# Patient Record
Sex: FEMALE | Race: BLACK OR AFRICAN AMERICAN | Employment: OTHER | URBAN - NONMETROPOLITAN AREA
[De-identification: names, ages, dates, MRNs, and addresses within clinical notes are randomized per-mention and may not be internally consistent; named-entity substitution may affect disease eponyms.]

---

## 2017-05-31 PROBLEM — E66.9 DIABETES MELLITUS TYPE 2 IN OBESE (HCC): Status: ACTIVE | Noted: 2017-05-31

## 2017-05-31 PROBLEM — R07.9 CHEST PAIN: Status: ACTIVE | Noted: 2017-05-31

## 2017-05-31 PROBLEM — E11.69 DIABETES MELLITUS TYPE 2 IN OBESE (HCC): Status: ACTIVE | Noted: 2017-05-31

## 2017-10-24 ENCOUNTER — HOSPITAL ENCOUNTER (OUTPATIENT)
Dept: ULTRASOUND IMAGING | Age: 62
Discharge: HOME | End: 2017-10-24
Payer: MEDICARE

## 2017-10-24 DIAGNOSIS — E04.1 LEFT THYROID NODULE: ICD-10-CM

## 2017-10-24 PROCEDURE — 76536 US EXAM OF HEAD AND NECK: CPT

## 2017-11-15 ENCOUNTER — HOSPITAL ENCOUNTER (OUTPATIENT)
Dept: ULTRASOUND IMAGING | Age: 62
Discharge: HOME | End: 2017-11-15
Payer: MEDICARE

## 2017-11-15 DIAGNOSIS — E04.2 MULTIPLE THYROID NODULES: ICD-10-CM

## 2017-11-15 PROCEDURE — 88172 CYTP DX EVAL FNA 1ST EA SITE: CPT

## 2017-11-15 PROCEDURE — 88173 CYTOPATH EVAL FNA REPORT: CPT

## 2017-11-15 PROCEDURE — 88177 CYTP FNA EVAL EA ADDL: CPT

## 2017-11-15 PROCEDURE — 76942 ECHO GUIDE FOR BIOPSY: CPT

## 2017-11-15 NOTE — PROGRESS NOTES
Formulation and discussion of sedation / procedure plans, risks, benefits, side effects and alternatives with patient and/or responsible adult completed.     Electronically signed by Ryder Hairston MD on 11/15/2017 at 10:33 AM

## 2018-02-16 ENCOUNTER — TELEPHONE (OUTPATIENT)
Dept: PULMONOLOGY | Age: 63
End: 2018-02-16

## 2018-06-07 ENCOUNTER — HOSPITAL ENCOUNTER (INPATIENT)
Age: 63
LOS: 2 days | Discharge: HOME HEALTH | DRG: 041 | End: 2018-06-09
Attending: EMERGENCY MEDICINE | Admitting: INTERNAL MEDICINE
Payer: MEDICARE

## 2018-06-07 ENCOUNTER — APPOINTMENT (OUTPATIENT)
Dept: MRI IMAGING | Age: 63
DRG: 041 | End: 2018-06-07
Payer: MEDICARE

## 2018-06-07 ENCOUNTER — APPOINTMENT (OUTPATIENT)
Dept: CT IMAGING | Age: 63
DRG: 041 | End: 2018-06-07
Payer: MEDICARE

## 2018-06-07 DIAGNOSIS — R20.2 PARESTHESIA: Primary | ICD-10-CM

## 2018-06-07 DIAGNOSIS — I63.039 CEREBROVASCULAR ACCIDENT (CVA) DUE TO THROMBOSIS OF CAROTID ARTERY, UNSPECIFIED BLOOD VESSEL LATERALITY (HCC): ICD-10-CM

## 2018-06-07 PROBLEM — I63.9 ACUTE CVA (CEREBROVASCULAR ACCIDENT) (HCC): Status: ACTIVE | Noted: 2018-06-07

## 2018-06-07 LAB
ALBUMIN SERPL-MCNC: 3.6 G/DL (ref 3.5–5.1)
ALP BLD-CCNC: 59 U/L (ref 38–126)
ALT SERPL-CCNC: 16 U/L (ref 11–66)
AMPHETAMINE+METHAMPHETAMINE URINE SCREEN: NEGATIVE
ANION GAP SERPL CALCULATED.3IONS-SCNC: 12 MEQ/L (ref 8–16)
ANISOCYTOSIS: ABNORMAL
APTT: 28.8 SECONDS (ref 22–38)
AST SERPL-CCNC: 18 U/L (ref 5–40)
BACTERIA: ABNORMAL /HPF
BARBITURATE QUANTITATIVE URINE: NEGATIVE
BASOPHILS # BLD: 0.5 %
BASOPHILS ABSOLUTE: 0 THOU/MM3 (ref 0–0.1)
BENZODIAZEPINE QUANTITATIVE URINE: NEGATIVE
BILIRUB SERPL-MCNC: 0.5 MG/DL (ref 0.3–1.2)
BILIRUBIN DIRECT: < 0.2 MG/DL (ref 0–0.3)
BILIRUBIN URINE: NEGATIVE
BLOOD, URINE: ABNORMAL
BUN BLDV-MCNC: 16 MG/DL (ref 7–22)
CALCIUM SERPL-MCNC: 9.4 MG/DL (ref 8.5–10.5)
CANNABINOID QUANTITATIVE URINE: NEGATIVE
CASTS 2: ABNORMAL /LPF
CASTS UA: ABNORMAL /LPF
CHARACTER, URINE: CLEAR
CHLORIDE BLD-SCNC: 93 MEQ/L (ref 98–111)
CHOLESTEROL, TOTAL: 158 MG/DL (ref 100–199)
CO2: 27 MEQ/L (ref 23–33)
COCAINE METABOLITE QUANTITATIVE URINE: NEGATIVE
COLOR: YELLOW
CREAT SERPL-MCNC: 0.9 MG/DL (ref 0.4–1.2)
CRYSTALS, UA: ABNORMAL
EKG ATRIAL RATE: 63 BPM
EKG P AXIS: 39 DEGREES
EKG P-R INTERVAL: 176 MS
EKG Q-T INTERVAL: 448 MS
EKG QRS DURATION: 106 MS
EKG QTC CALCULATION (BAZETT): 458 MS
EKG R AXIS: 12 DEGREES
EKG T AXIS: 19 DEGREES
EKG VENTRICULAR RATE: 63 BPM
EOSINOPHIL # BLD: 3.3 %
EOSINOPHILS ABSOLUTE: 0.3 THOU/MM3 (ref 0–0.4)
EPITHELIAL CELLS, UA: ABNORMAL /HPF
ETHYL ALCOHOL, SERUM: < 0.01 %
GFR SERPL CREATININE-BSD FRML MDRD: 76 ML/MIN/1.73M2
GLUCOSE BLD-MCNC: 160 MG/DL (ref 70–108)
GLUCOSE URINE: NEGATIVE MG/DL
HCT VFR BLD CALC: 46 % (ref 37–47)
HDLC SERPL-MCNC: 52 MG/DL
HEMOGLOBIN: 15.2 GM/DL (ref 12–16)
INR BLD: 1 (ref 0.85–1.13)
KETONES, URINE: NEGATIVE
LDL CHOLESTEROL CALCULATED: 90 MG/DL
LEUKOCYTE ESTERASE, URINE: ABNORMAL
LIPASE: 19.1 U/L (ref 5.6–51.3)
LV EF: 60 %
LVEF MODALITY: NORMAL
LYMPHOCYTES # BLD: 28.8 %
LYMPHOCYTES ABSOLUTE: 2.3 THOU/MM3 (ref 1–4.8)
MAGNESIUM: 1.7 MG/DL (ref 1.6–2.4)
MCH RBC QN AUTO: 27.1 PG (ref 27–31)
MCHC RBC AUTO-ENTMCNC: 33.1 GM/DL (ref 33–37)
MCV RBC AUTO: 81.7 FL (ref 81–99)
MISCELLANEOUS 2: ABNORMAL
MONOCYTES # BLD: 6.1 %
MONOCYTES ABSOLUTE: 0.5 THOU/MM3 (ref 0.4–1.3)
NITRITE, URINE: NEGATIVE
NUCLEATED RED BLOOD CELLS: 0 /100 WBC
OPIATES, URINE: NEGATIVE
OSMOLALITY CALCULATION: 269.1 MOSMOL/KG (ref 275–300)
OXYCODONE: NEGATIVE
PDW BLD-RTO: 14.8 % (ref 11.5–14.5)
PH UA: 5.5
PHENCYCLIDINE QUANTITATIVE URINE: NEGATIVE
PLATELET # BLD: 202 THOU/MM3 (ref 130–400)
PMV BLD AUTO: 9.4 FL (ref 7.4–10.4)
POTASSIUM SERPL-SCNC: 4 MEQ/L (ref 3.5–5.2)
PROTEIN UA: NEGATIVE
RBC # BLD: 5.63 MILL/MM3 (ref 4.2–5.4)
RBC URINE: ABNORMAL /HPF
RENAL EPITHELIAL, UA: ABNORMAL
SEG NEUTROPHILS: 61.3 %
SEGMENTED NEUTROPHILS ABSOLUTE COUNT: 5 THOU/MM3 (ref 1.8–7.7)
SODIUM BLD-SCNC: 132 MEQ/L (ref 135–145)
SPECIFIC GRAVITY, URINE: 1.01 (ref 1–1.03)
TOTAL PROTEIN: 7 G/DL (ref 6.1–8)
TRIGL SERPL-MCNC: 81 MG/DL (ref 0–199)
TROPONIN T: < 0.01 NG/ML
TSH SERPL DL<=0.05 MIU/L-ACNC: 4.21 UIU/ML (ref 0.4–4.2)
UROBILINOGEN, URINE: 0.2 EU/DL
WBC # BLD: 8.1 THOU/MM3 (ref 4.8–10.8)
WBC UA: ABNORMAL /HPF
YEAST: ABNORMAL

## 2018-06-07 PROCEDURE — 80053 COMPREHEN METABOLIC PANEL: CPT

## 2018-06-07 PROCEDURE — 99222 1ST HOSP IP/OBS MODERATE 55: CPT | Performed by: INTERNAL MEDICINE

## 2018-06-07 PROCEDURE — 6360000002 HC RX W HCPCS: Performed by: INTERNAL MEDICINE

## 2018-06-07 PROCEDURE — 97530 THERAPEUTIC ACTIVITIES: CPT

## 2018-06-07 PROCEDURE — 1210000002 HC MED SURG R&B - NEUROSCIENCE

## 2018-06-07 PROCEDURE — 6370000000 HC RX 637 (ALT 250 FOR IP): Performed by: INTERNAL MEDICINE

## 2018-06-07 PROCEDURE — G0480 DRUG TEST DEF 1-7 CLASSES: HCPCS

## 2018-06-07 PROCEDURE — 97166 OT EVAL MOD COMPLEX 45 MIN: CPT

## 2018-06-07 PROCEDURE — 70498 CT ANGIOGRAPHY NECK: CPT

## 2018-06-07 PROCEDURE — G8988 SELF CARE GOAL STATUS: HCPCS

## 2018-06-07 PROCEDURE — 99285 EMERGENCY DEPT VISIT HI MDM: CPT

## 2018-06-07 PROCEDURE — 6370000000 HC RX 637 (ALT 250 FOR IP): Performed by: EMERGENCY MEDICINE

## 2018-06-07 PROCEDURE — 84443 ASSAY THYROID STIM HORMONE: CPT

## 2018-06-07 PROCEDURE — G8979 MOBILITY GOAL STATUS: HCPCS

## 2018-06-07 PROCEDURE — 87086 URINE CULTURE/COLONY COUNT: CPT

## 2018-06-07 PROCEDURE — 93010 ELECTROCARDIOGRAM REPORT: CPT | Performed by: INTERNAL MEDICINE

## 2018-06-07 PROCEDURE — 2580000003 HC RX 258: Performed by: INTERNAL MEDICINE

## 2018-06-07 PROCEDURE — 92610 EVALUATE SWALLOWING FUNCTION: CPT

## 2018-06-07 PROCEDURE — G8987 SELF CARE CURRENT STATUS: HCPCS

## 2018-06-07 PROCEDURE — 97535 SELF CARE MNGMENT TRAINING: CPT

## 2018-06-07 PROCEDURE — 70496 CT ANGIOGRAPHY HEAD: CPT

## 2018-06-07 PROCEDURE — 80061 LIPID PANEL: CPT

## 2018-06-07 PROCEDURE — G8978 MOBILITY CURRENT STATUS: HCPCS

## 2018-06-07 PROCEDURE — 83735 ASSAY OF MAGNESIUM: CPT

## 2018-06-07 PROCEDURE — 85610 PROTHROMBIN TIME: CPT

## 2018-06-07 PROCEDURE — 82248 BILIRUBIN DIRECT: CPT

## 2018-06-07 PROCEDURE — 93005 ELECTROCARDIOGRAM TRACING: CPT | Performed by: EMERGENCY MEDICINE

## 2018-06-07 PROCEDURE — 70551 MRI BRAIN STEM W/O DYE: CPT

## 2018-06-07 PROCEDURE — 85025 COMPLETE CBC W/AUTO DIFF WBC: CPT

## 2018-06-07 PROCEDURE — 97110 THERAPEUTIC EXERCISES: CPT

## 2018-06-07 PROCEDURE — 97161 PT EVAL LOW COMPLEX 20 MIN: CPT

## 2018-06-07 PROCEDURE — 36415 COLL VENOUS BLD VENIPUNCTURE: CPT

## 2018-06-07 PROCEDURE — 85730 THROMBOPLASTIN TIME PARTIAL: CPT

## 2018-06-07 PROCEDURE — 93306 TTE W/DOPPLER COMPLETE: CPT

## 2018-06-07 PROCEDURE — 70450 CT HEAD/BRAIN W/O DYE: CPT

## 2018-06-07 PROCEDURE — 6360000004 HC RX CONTRAST MEDICATION: Performed by: EMERGENCY MEDICINE

## 2018-06-07 PROCEDURE — 83690 ASSAY OF LIPASE: CPT

## 2018-06-07 PROCEDURE — 80307 DRUG TEST PRSMV CHEM ANLYZR: CPT

## 2018-06-07 PROCEDURE — 84484 ASSAY OF TROPONIN QUANT: CPT

## 2018-06-07 PROCEDURE — 81001 URINALYSIS AUTO W/SCOPE: CPT

## 2018-06-07 RX ORDER — ASPIRIN 81 MG/1
81 TABLET, CHEWABLE ORAL DAILY
Status: DISCONTINUED | OUTPATIENT
Start: 2018-06-07 | End: 2018-06-09 | Stop reason: HOSPADM

## 2018-06-07 RX ORDER — SODIUM CHLORIDE 9 MG/ML
INJECTION, SOLUTION INTRAVENOUS CONTINUOUS
Status: DISCONTINUED | OUTPATIENT
Start: 2018-06-07 | End: 2018-06-09 | Stop reason: HOSPADM

## 2018-06-07 RX ORDER — FUROSEMIDE 40 MG/1
40 TABLET ORAL DAILY
Status: DISCONTINUED | OUTPATIENT
Start: 2018-06-07 | End: 2018-06-07

## 2018-06-07 RX ORDER — HYDRALAZINE HYDROCHLORIDE 25 MG/1
25 TABLET, FILM COATED ORAL 3 TIMES DAILY
Status: DISCONTINUED | OUTPATIENT
Start: 2018-06-07 | End: 2018-06-09 | Stop reason: HOSPADM

## 2018-06-07 RX ORDER — ATENOLOL 100 MG/1
100 TABLET ORAL DAILY
Status: DISCONTINUED | OUTPATIENT
Start: 2018-06-07 | End: 2018-06-09 | Stop reason: HOSPADM

## 2018-06-07 RX ORDER — AMLODIPINE BESYLATE 5 MG/1
5 TABLET ORAL DAILY
Status: DISCONTINUED | OUTPATIENT
Start: 2018-06-07 | End: 2018-06-09 | Stop reason: HOSPADM

## 2018-06-07 RX ORDER — ERGOCALCIFEROL 1.25 MG/1
50000 CAPSULE ORAL WEEKLY
Status: DISCONTINUED | OUTPATIENT
Start: 2018-06-13 | End: 2018-06-09 | Stop reason: HOSPADM

## 2018-06-07 RX ORDER — NAPROXEN 250 MG/1
500 TABLET ORAL 2 TIMES DAILY
Status: DISCONTINUED | OUTPATIENT
Start: 2018-06-07 | End: 2018-06-09 | Stop reason: HOSPADM

## 2018-06-07 RX ORDER — MORPHINE SULFATE 2 MG/ML
2 INJECTION, SOLUTION INTRAMUSCULAR; INTRAVENOUS EVERY 4 HOURS PRN
Status: DISPENSED | OUTPATIENT
Start: 2018-06-07 | End: 2018-06-08

## 2018-06-07 RX ORDER — SODIUM CHLORIDE 0.9 % (FLUSH) 0.9 %
10 SYRINGE (ML) INJECTION EVERY 12 HOURS SCHEDULED
Status: DISCONTINUED | OUTPATIENT
Start: 2018-06-07 | End: 2018-06-09 | Stop reason: HOSPADM

## 2018-06-07 RX ORDER — PRAVASTATIN SODIUM 80 MG/1
80 TABLET ORAL NIGHTLY
Status: DISCONTINUED | OUTPATIENT
Start: 2018-06-07 | End: 2018-06-09 | Stop reason: HOSPADM

## 2018-06-07 RX ORDER — ASCORBIC ACID 500 MG
250 TABLET ORAL DAILY
Status: DISCONTINUED | OUTPATIENT
Start: 2018-06-07 | End: 2018-06-09 | Stop reason: HOSPADM

## 2018-06-07 RX ORDER — ASPIRIN 325 MG
325 TABLET ORAL ONCE
Status: COMPLETED | OUTPATIENT
Start: 2018-06-07 | End: 2018-06-07

## 2018-06-07 RX ORDER — FERROUS SULFATE 325(65) MG
325 TABLET ORAL
Status: DISCONTINUED | OUTPATIENT
Start: 2018-06-07 | End: 2018-06-09 | Stop reason: HOSPADM

## 2018-06-07 RX ORDER — ACETAMINOPHEN 325 MG/1
650 TABLET ORAL EVERY 4 HOURS PRN
Status: DISCONTINUED | OUTPATIENT
Start: 2018-06-07 | End: 2018-06-09 | Stop reason: HOSPADM

## 2018-06-07 RX ORDER — ONDANSETRON 2 MG/ML
4 INJECTION INTRAMUSCULAR; INTRAVENOUS EVERY 6 HOURS PRN
Status: DISCONTINUED | OUTPATIENT
Start: 2018-06-07 | End: 2018-06-09 | Stop reason: HOSPADM

## 2018-06-07 RX ORDER — VITAMIN E 268 MG
400 CAPSULE ORAL DAILY
Status: DISCONTINUED | OUTPATIENT
Start: 2018-06-07 | End: 2018-06-09 | Stop reason: HOSPADM

## 2018-06-07 RX ORDER — ONDANSETRON 2 MG/ML
4 INJECTION INTRAMUSCULAR; INTRAVENOUS EVERY 6 HOURS PRN
Status: DISCONTINUED | OUTPATIENT
Start: 2018-06-07 | End: 2018-06-07 | Stop reason: SDUPTHER

## 2018-06-07 RX ORDER — POTASSIUM CHLORIDE 20 MEQ/1
20 TABLET, EXTENDED RELEASE ORAL DAILY
Status: DISCONTINUED | OUTPATIENT
Start: 2018-06-07 | End: 2018-06-09 | Stop reason: HOSPADM

## 2018-06-07 RX ORDER — OMEGA-3-ACID ETHYL ESTERS 1 G/1
3000 CAPSULE, LIQUID FILLED ORAL DAILY
Status: DISCONTINUED | OUTPATIENT
Start: 2018-06-07 | End: 2018-06-09 | Stop reason: HOSPADM

## 2018-06-07 RX ORDER — SODIUM CHLORIDE 0.9 % (FLUSH) 0.9 %
10 SYRINGE (ML) INJECTION PRN
Status: DISCONTINUED | OUTPATIENT
Start: 2018-06-07 | End: 2018-06-09 | Stop reason: HOSPADM

## 2018-06-07 RX ADMIN — Medication 10 ML: at 21:26

## 2018-06-07 RX ADMIN — VITAMIN E CAP 400 UNIT 400 UNITS: 400 CAP at 09:42

## 2018-06-07 RX ADMIN — POTASSIUM CHLORIDE 20 MEQ: 20 TABLET, EXTENDED RELEASE ORAL at 09:42

## 2018-06-07 RX ADMIN — FERROUS SULFATE TAB 325 MG (65 MG ELEMENTAL FE) 325 MG: 325 (65 FE) TAB at 16:30

## 2018-06-07 RX ADMIN — MORPHINE SULFATE 2 MG: 2 INJECTION, SOLUTION INTRAMUSCULAR; INTRAVENOUS at 13:49

## 2018-06-07 RX ADMIN — AMLODIPINE BESYLATE 5 MG: 5 TABLET ORAL at 09:42

## 2018-06-07 RX ADMIN — PRAVASTATIN SODIUM 80 MG: 80 TABLET ORAL at 21:28

## 2018-06-07 RX ADMIN — MORPHINE SULFATE 2 MG: 2 INJECTION, SOLUTION INTRAMUSCULAR; INTRAVENOUS at 07:01

## 2018-06-07 RX ADMIN — FERROUS SULFATE TAB 325 MG (65 MG ELEMENTAL FE) 325 MG: 325 (65 FE) TAB at 09:42

## 2018-06-07 RX ADMIN — HYDRALAZINE HYDROCHLORIDE 25 MG: 25 TABLET, FILM COATED ORAL at 21:28

## 2018-06-07 RX ADMIN — SODIUM CHLORIDE: 9 INJECTION, SOLUTION INTRAVENOUS at 06:59

## 2018-06-07 RX ADMIN — ENOXAPARIN SODIUM 60 MG: 60 INJECTION SUBCUTANEOUS at 09:41

## 2018-06-07 RX ADMIN — ASPIRIN 325 MG: 325 TABLET, COATED ORAL at 05:10

## 2018-06-07 RX ADMIN — NAPROXEN 500 MG: 250 TABLET ORAL at 21:28

## 2018-06-07 RX ADMIN — OMEGA-3-ACID ETHYL ESTERS 3000 MG: 1 CAPSULE, LIQUID FILLED ORAL at 09:42

## 2018-06-07 RX ADMIN — FERROUS SULFATE TAB 325 MG (65 MG ELEMENTAL FE) 325 MG: 325 (65 FE) TAB at 13:43

## 2018-06-07 RX ADMIN — IOPAMIDOL 100 ML: 755 INJECTION, SOLUTION INTRAVENOUS at 03:23

## 2018-06-07 RX ADMIN — ENOXAPARIN SODIUM 60 MG: 60 INJECTION SUBCUTANEOUS at 21:25

## 2018-06-07 RX ADMIN — HYDRALAZINE HYDROCHLORIDE 25 MG: 25 TABLET, FILM COATED ORAL at 13:43

## 2018-06-07 RX ADMIN — ATENOLOL 100 MG: 100 TABLET ORAL at 09:42

## 2018-06-07 RX ADMIN — HYDRALAZINE HYDROCHLORIDE 25 MG: 25 TABLET, FILM COATED ORAL at 09:42

## 2018-06-07 RX ADMIN — ASPIRIN 81 MG 81 MG: 81 TABLET ORAL at 09:42

## 2018-06-07 RX ADMIN — Medication 250 MG: at 09:42

## 2018-06-07 ASSESSMENT — PAIN DESCRIPTION - LOCATION
LOCATION: NECK;LEG
LOCATION: NECK
LOCATION: BACK
LOCATION: NECK
LOCATION: BACK

## 2018-06-07 ASSESSMENT — ENCOUNTER SYMPTOMS
EYE ITCHING: 0
BACK PAIN: 0
CHEST TIGHTNESS: 0
COUGH: 0
EYE PAIN: 0
CHOKING: 0
TROUBLE SWALLOWING: 0
SORE THROAT: 0
CONSTIPATION: 0
EYE REDNESS: 0
PHOTOPHOBIA: 0
NAUSEA: 0
ABDOMINAL DISTENTION: 0
VOMITING: 0
ABDOMINAL PAIN: 0
DIARRHEA: 0
VOICE CHANGE: 0
RHINORRHEA: 0
BLOOD IN STOOL: 0
EYE DISCHARGE: 0
WHEEZING: 0
SINUS PRESSURE: 0
SHORTNESS OF BREATH: 0

## 2018-06-07 ASSESSMENT — PAIN DESCRIPTION - ONSET: ONSET: ON-GOING

## 2018-06-07 ASSESSMENT — PAIN DESCRIPTION - DESCRIPTORS
DESCRIPTORS: THROBBING
DESCRIPTORS: THROBBING
DESCRIPTORS: ACHING;BURNING;CONSTANT;CRAMPING
DESCRIPTORS: ACHING
DESCRIPTORS: ACHING

## 2018-06-07 ASSESSMENT — PAIN DESCRIPTION - ORIENTATION
ORIENTATION: RIGHT
ORIENTATION: LOWER
ORIENTATION: LOWER

## 2018-06-07 ASSESSMENT — PAIN SCALES - GENERAL
PAINLEVEL_OUTOF10: 10
PAINLEVEL_OUTOF10: 8
PAINLEVEL_OUTOF10: 0
PAINLEVEL_OUTOF10: 8
PAINLEVEL_OUTOF10: 9
PAINLEVEL_OUTOF10: 0
PAINLEVEL_OUTOF10: 8

## 2018-06-07 ASSESSMENT — PAIN DESCRIPTION - FREQUENCY
FREQUENCY: INTERMITTENT
FREQUENCY: INTERMITTENT

## 2018-06-07 ASSESSMENT — PAIN DESCRIPTION - PAIN TYPE
TYPE: ACUTE PAIN
TYPE: CHRONIC PAIN
TYPE: CHRONIC PAIN
TYPE: ACUTE PAIN
TYPE: ACUTE PAIN;CHRONIC PAIN

## 2018-06-08 ENCOUNTER — APPOINTMENT (OUTPATIENT)
Dept: CT IMAGING | Age: 63
DRG: 041 | End: 2018-06-08
Payer: MEDICARE

## 2018-06-08 ENCOUNTER — ANESTHESIA (OUTPATIENT)
Dept: ENDOSCOPY | Age: 63
DRG: 041 | End: 2018-06-08
Payer: MEDICARE

## 2018-06-08 ENCOUNTER — ANESTHESIA EVENT (OUTPATIENT)
Dept: ENDOSCOPY | Age: 63
DRG: 041 | End: 2018-06-08
Payer: MEDICARE

## 2018-06-08 VITALS
RESPIRATION RATE: 23 BRPM | DIASTOLIC BLOOD PRESSURE: 70 MMHG | SYSTOLIC BLOOD PRESSURE: 125 MMHG | OXYGEN SATURATION: 100 %

## 2018-06-08 LAB
HCT VFR BLD CALC: 42.5 % (ref 37–47)
HEMOGLOBIN: 14.1 GM/DL (ref 12–16)
LV EF: 60 %
LVEF MODALITY: NORMAL
MCH RBC QN AUTO: 27.4 PG (ref 27–31)
MCHC RBC AUTO-ENTMCNC: 33.2 GM/DL (ref 33–37)
MCV RBC AUTO: 82.5 FL (ref 81–99)
ORGANISM: ABNORMAL
PDW BLD-RTO: 15.2 % (ref 11.5–14.5)
PLATELET # BLD: 181 THOU/MM3 (ref 130–400)
PMV BLD AUTO: 9.4 FL (ref 7.4–10.4)
RBC # BLD: 5.15 MILL/MM3 (ref 4.2–5.4)
URINE CULTURE REFLEX: ABNORMAL
WBC # BLD: 6.8 THOU/MM3 (ref 4.8–10.8)

## 2018-06-08 PROCEDURE — 93325 DOPPLER ECHO COLOR FLOW MAPG: CPT

## 2018-06-08 PROCEDURE — 99222 1ST HOSP IP/OBS MODERATE 55: CPT | Performed by: INTERNAL MEDICINE

## 2018-06-08 PROCEDURE — 97110 THERAPEUTIC EXERCISES: CPT

## 2018-06-08 PROCEDURE — 6360000002 HC RX W HCPCS: Performed by: NURSE ANESTHETIST, CERTIFIED REGISTERED

## 2018-06-08 PROCEDURE — 2580000003 HC RX 258: Performed by: INTERNAL MEDICINE

## 2018-06-08 PROCEDURE — 2500000003 HC RX 250 WO HCPCS: Performed by: NURSE ANESTHETIST, CERTIFIED REGISTERED

## 2018-06-08 PROCEDURE — 1210000002 HC MED SURG R&B - NEUROSCIENCE

## 2018-06-08 PROCEDURE — 99232 SBSQ HOSP IP/OBS MODERATE 35: CPT | Performed by: INTERNAL MEDICINE

## 2018-06-08 PROCEDURE — 93312 ECHO TRANSESOPHAGEAL: CPT

## 2018-06-08 PROCEDURE — 92526 ORAL FUNCTION THERAPY: CPT

## 2018-06-08 PROCEDURE — 6370000000 HC RX 637 (ALT 250 FOR IP): Performed by: INTERNAL MEDICINE

## 2018-06-08 PROCEDURE — 6370000000 HC RX 637 (ALT 250 FOR IP)

## 2018-06-08 PROCEDURE — 7100000000 HC PACU RECOVERY - FIRST 15 MIN: Performed by: INTERNAL MEDICINE

## 2018-06-08 PROCEDURE — 72125 CT NECK SPINE W/O DYE: CPT

## 2018-06-08 PROCEDURE — 0JH632Z INSERTION OF MONITORING DEVICE INTO CHEST SUBCUTANEOUS TISSUE AND FASCIA, PERCUTANEOUS APPROACH: ICD-10-PCS | Performed by: INTERNAL MEDICINE

## 2018-06-08 PROCEDURE — 85027 COMPLETE CBC AUTOMATED: CPT

## 2018-06-08 PROCEDURE — 36415 COLL VENOUS BLD VENIPUNCTURE: CPT

## 2018-06-08 PROCEDURE — 33282 PR IMPLANTATION PT-ACTIVATED CARDIAC EVENT RECORDER: CPT | Performed by: INTERNAL MEDICINE

## 2018-06-08 PROCEDURE — 33282 HC IMPLANTABLE LOOP RECORDER: CPT | Performed by: INTERNAL MEDICINE

## 2018-06-08 PROCEDURE — 97116 GAIT TRAINING THERAPY: CPT

## 2018-06-08 PROCEDURE — 93320 DOPPLER ECHO COMPLETE: CPT

## 2018-06-08 PROCEDURE — 97535 SELF CARE MNGMENT TRAINING: CPT

## 2018-06-08 PROCEDURE — 3700000001 HC ADD 15 MINUTES (ANESTHESIA): Performed by: INTERNAL MEDICINE

## 2018-06-08 PROCEDURE — 2500000003 HC RX 250 WO HCPCS

## 2018-06-08 PROCEDURE — 3700000000 HC ANESTHESIA ATTENDED CARE: Performed by: INTERNAL MEDICINE

## 2018-06-08 PROCEDURE — 99222 1ST HOSP IP/OBS MODERATE 55: CPT | Performed by: PSYCHIATRY & NEUROLOGY

## 2018-06-08 PROCEDURE — C1764 EVENT RECORDER, CARDIAC: HCPCS

## 2018-06-08 RX ORDER — DOCUSATE SODIUM 100 MG/1
100 CAPSULE, LIQUID FILLED ORAL 2 TIMES DAILY
Status: DISCONTINUED | OUTPATIENT
Start: 2018-06-08 | End: 2018-06-09 | Stop reason: HOSPADM

## 2018-06-08 RX ORDER — PROPOFOL 10 MG/ML
INJECTION, EMULSION INTRAVENOUS PRN
Status: DISCONTINUED | OUTPATIENT
Start: 2018-06-08 | End: 2018-06-08 | Stop reason: SDUPTHER

## 2018-06-08 RX ORDER — LIDOCAINE HYDROCHLORIDE 20 MG/ML
INJECTION, SOLUTION INFILTRATION; PERINEURAL PRN
Status: DISCONTINUED | OUTPATIENT
Start: 2018-06-08 | End: 2018-06-08 | Stop reason: SDUPTHER

## 2018-06-08 RX ORDER — SODIUM CHLORIDE 450 MG/100ML
INJECTION, SOLUTION INTRAVENOUS CONTINUOUS
Status: DISCONTINUED | OUTPATIENT
Start: 2018-06-08 | End: 2018-06-09 | Stop reason: HOSPADM

## 2018-06-08 RX ADMIN — PRAVASTATIN SODIUM 80 MG: 80 TABLET ORAL at 19:54

## 2018-06-08 RX ADMIN — AMLODIPINE BESYLATE 5 MG: 5 TABLET ORAL at 08:23

## 2018-06-08 RX ADMIN — DOCUSATE SODIUM 100 MG: 100 CAPSULE, LIQUID FILLED ORAL at 16:10

## 2018-06-08 RX ADMIN — Medication 10 ML: at 19:51

## 2018-06-08 RX ADMIN — HYDRALAZINE HYDROCHLORIDE 25 MG: 25 TABLET, FILM COATED ORAL at 08:23

## 2018-06-08 RX ADMIN — PROPOFOL 50 MG: 10 INJECTION, EMULSION INTRAVENOUS at 12:43

## 2018-06-08 RX ADMIN — SODIUM CHLORIDE: 4.5 INJECTION, SOLUTION INTRAVENOUS at 12:29

## 2018-06-08 RX ADMIN — FERROUS SULFATE TAB 325 MG (65 MG ELEMENTAL FE) 325 MG: 325 (65 FE) TAB at 16:10

## 2018-06-08 RX ADMIN — PROPOFOL 30 MG: 10 INJECTION, EMULSION INTRAVENOUS at 12:46

## 2018-06-08 RX ADMIN — PROPOFOL 40 MG: 10 INJECTION, EMULSION INTRAVENOUS at 12:45

## 2018-06-08 RX ADMIN — PROPOFOL 50 MG: 10 INJECTION, EMULSION INTRAVENOUS at 12:42

## 2018-06-08 RX ADMIN — HYDRALAZINE HYDROCHLORIDE 25 MG: 25 TABLET, FILM COATED ORAL at 16:10

## 2018-06-08 RX ADMIN — LIDOCAINE HYDROCHLORIDE 100 MG: 20 INJECTION, SOLUTION INFILTRATION; PERINEURAL at 12:42

## 2018-06-08 RX ADMIN — PROPOFOL 30 MG: 10 INJECTION, EMULSION INTRAVENOUS at 12:48

## 2018-06-08 RX ADMIN — HYDRALAZINE HYDROCHLORIDE 25 MG: 25 TABLET, FILM COATED ORAL at 19:53

## 2018-06-08 RX ADMIN — ACETAMINOPHEN 650 MG: 325 TABLET ORAL at 18:53

## 2018-06-08 RX ADMIN — PROPOFOL 40 MG: 10 INJECTION, EMULSION INTRAVENOUS at 12:47

## 2018-06-08 RX ADMIN — ASPIRIN 81 MG 81 MG: 81 TABLET ORAL at 08:23

## 2018-06-08 ASSESSMENT — PAIN DESCRIPTION - LOCATION: LOCATION: BACK

## 2018-06-08 ASSESSMENT — PAIN SCALES - GENERAL
PAINLEVEL_OUTOF10: 0
PAINLEVEL_OUTOF10: 0
PAINLEVEL_OUTOF10: 8
PAINLEVEL_OUTOF10: 0
PAINLEVEL_OUTOF10: 9
PAINLEVEL_OUTOF10: 0

## 2018-06-08 ASSESSMENT — PAIN DESCRIPTION - ORIENTATION: ORIENTATION: LOWER

## 2018-06-08 ASSESSMENT — PAIN DESCRIPTION - PAIN TYPE: TYPE: CHRONIC PAIN

## 2018-06-08 ASSESSMENT — PAIN DESCRIPTION - FREQUENCY: FREQUENCY: INTERMITTENT

## 2018-06-08 ASSESSMENT — PAIN DESCRIPTION - ONSET: ONSET: ON-GOING

## 2018-06-08 ASSESSMENT — PAIN DESCRIPTION - DESCRIPTORS: DESCRIPTORS: ACHING

## 2018-06-08 ASSESSMENT — PAIN - FUNCTIONAL ASSESSMENT: PAIN_FUNCTIONAL_ASSESSMENT: 0-10

## 2018-06-09 ENCOUNTER — APPOINTMENT (OUTPATIENT)
Dept: ULTRASOUND IMAGING | Age: 63
DRG: 041 | End: 2018-06-09
Payer: MEDICARE

## 2018-06-09 VITALS
RESPIRATION RATE: 20 BRPM | SYSTOLIC BLOOD PRESSURE: 142 MMHG | BODY MASS INDEX: 44.41 KG/M2 | WEIGHT: 293 LBS | HEIGHT: 68 IN | OXYGEN SATURATION: 96 % | TEMPERATURE: 98 F | HEART RATE: 65 BPM | DIASTOLIC BLOOD PRESSURE: 80 MMHG

## 2018-06-09 LAB
ANION GAP SERPL CALCULATED.3IONS-SCNC: 12 MEQ/L (ref 8–16)
BUN BLDV-MCNC: 14 MG/DL (ref 7–22)
CALCIUM SERPL-MCNC: 8.9 MG/DL (ref 8.5–10.5)
CHLORIDE BLD-SCNC: 100 MEQ/L (ref 98–111)
CO2: 26 MEQ/L (ref 23–33)
CREAT SERPL-MCNC: 0.8 MG/DL (ref 0.4–1.2)
GFR SERPL CREATININE-BSD FRML MDRD: 87 ML/MIN/1.73M2
GLUCOSE BLD-MCNC: 230 MG/DL (ref 70–108)
HCT VFR BLD CALC: 43.8 % (ref 37–47)
HEMOGLOBIN: 14.4 GM/DL (ref 12–16)
MCH RBC QN AUTO: 27 PG (ref 27–31)
MCHC RBC AUTO-ENTMCNC: 32.8 GM/DL (ref 33–37)
MCV RBC AUTO: 82.3 FL (ref 81–99)
PDW BLD-RTO: 15 % (ref 11.5–14.5)
PLATELET # BLD: 193 THOU/MM3 (ref 130–400)
PMV BLD AUTO: 9.8 FL (ref 7.4–10.4)
POTASSIUM SERPL-SCNC: 4.4 MEQ/L (ref 3.5–5.2)
RBC # BLD: 5.32 MILL/MM3 (ref 4.2–5.4)
SODIUM BLD-SCNC: 138 MEQ/L (ref 135–145)
T4 FREE: 1.04 NG/DL (ref 0.93–1.76)
TSH SERPL DL<=0.05 MIU/L-ACNC: 2.46 UIU/ML (ref 0.4–4.2)
WBC # BLD: 6.2 THOU/MM3 (ref 4.8–10.8)

## 2018-06-09 PROCEDURE — 84439 ASSAY OF FREE THYROXINE: CPT

## 2018-06-09 PROCEDURE — 36415 COLL VENOUS BLD VENIPUNCTURE: CPT

## 2018-06-09 PROCEDURE — 2580000003 HC RX 258: Performed by: INTERNAL MEDICINE

## 2018-06-09 PROCEDURE — 84443 ASSAY THYROID STIM HORMONE: CPT

## 2018-06-09 PROCEDURE — 6370000000 HC RX 637 (ALT 250 FOR IP): Performed by: INTERNAL MEDICINE

## 2018-06-09 PROCEDURE — 76536 US EXAM OF HEAD AND NECK: CPT

## 2018-06-09 PROCEDURE — 6360000002 HC RX W HCPCS: Performed by: INTERNAL MEDICINE

## 2018-06-09 PROCEDURE — 80048 BASIC METABOLIC PNL TOTAL CA: CPT

## 2018-06-09 PROCEDURE — 6370000000 HC RX 637 (ALT 250 FOR IP): Performed by: PSYCHIATRY & NEUROLOGY

## 2018-06-09 PROCEDURE — 99239 HOSP IP/OBS DSCHRG MGMT >30: CPT | Performed by: INTERNAL MEDICINE

## 2018-06-09 PROCEDURE — 85027 COMPLETE CBC AUTOMATED: CPT

## 2018-06-09 RX ORDER — CLOPIDOGREL BISULFATE 75 MG/1
75 TABLET ORAL DAILY
Qty: 30 TABLET | Refills: 3 | Status: SHIPPED | OUTPATIENT
Start: 2018-06-09 | End: 2019-12-06

## 2018-06-09 RX ORDER — CLOPIDOGREL BISULFATE 75 MG/1
75 TABLET ORAL DAILY
Status: DISCONTINUED | OUTPATIENT
Start: 2018-06-09 | End: 2018-06-09 | Stop reason: HOSPADM

## 2018-06-09 RX ORDER — PSEUDOEPHEDRINE HCL 30 MG
100 TABLET ORAL 2 TIMES DAILY PRN
Qty: 60 CAPSULE | Refills: 0 | Status: SHIPPED | OUTPATIENT
Start: 2018-06-09 | End: 2019-10-30

## 2018-06-09 RX ADMIN — DOCUSATE SODIUM 100 MG: 100 CAPSULE, LIQUID FILLED ORAL at 09:21

## 2018-06-09 RX ADMIN — HYDRALAZINE HYDROCHLORIDE 25 MG: 25 TABLET, FILM COATED ORAL at 13:57

## 2018-06-09 RX ADMIN — FERROUS SULFATE TAB 325 MG (65 MG ELEMENTAL FE) 325 MG: 325 (65 FE) TAB at 09:00

## 2018-06-09 RX ADMIN — ASPIRIN 81 MG 81 MG: 81 TABLET ORAL at 09:21

## 2018-06-09 RX ADMIN — FERROUS SULFATE TAB 325 MG (65 MG ELEMENTAL FE) 325 MG: 325 (65 FE) TAB at 12:47

## 2018-06-09 RX ADMIN — CLOPIDOGREL BISULFATE 75 MG: 75 TABLET ORAL at 18:19

## 2018-06-09 RX ADMIN — HYDRALAZINE HYDROCHLORIDE 25 MG: 25 TABLET, FILM COATED ORAL at 09:22

## 2018-06-09 RX ADMIN — ATENOLOL 100 MG: 100 TABLET ORAL at 09:22

## 2018-06-09 RX ADMIN — AMLODIPINE BESYLATE 5 MG: 5 TABLET ORAL at 09:22

## 2018-06-09 RX ADMIN — NAPROXEN 500 MG: 250 TABLET ORAL at 09:22

## 2018-06-09 RX ADMIN — Medication 250 MG: at 09:21

## 2018-06-09 RX ADMIN — ENOXAPARIN SODIUM 60 MG: 60 INJECTION SUBCUTANEOUS at 09:21

## 2018-06-09 RX ADMIN — Medication 10 ML: at 09:23

## 2018-06-09 RX ADMIN — OMEGA-3-ACID ETHYL ESTERS 3000 MG: 1 CAPSULE, LIQUID FILLED ORAL at 09:22

## 2018-06-09 RX ADMIN — POTASSIUM CHLORIDE 20 MEQ: 20 TABLET, EXTENDED RELEASE ORAL at 09:21

## 2018-06-09 RX ADMIN — FERROUS SULFATE TAB 325 MG (65 MG ELEMENTAL FE) 325 MG: 325 (65 FE) TAB at 18:19

## 2018-06-09 RX ADMIN — VITAMIN E CAP 400 UNIT 400 UNITS: 400 CAP at 09:21

## 2018-06-09 ASSESSMENT — PAIN SCALES - GENERAL
PAINLEVEL_OUTOF10: 0
PAINLEVEL_OUTOF10: 0

## 2018-06-12 ENCOUNTER — OFFICE VISIT (OUTPATIENT)
Dept: CARDIOLOGY CLINIC | Age: 63
End: 2018-06-12
Payer: MEDICARE

## 2018-06-12 VITALS — BODY MASS INDEX: 44.41 KG/M2 | WEIGHT: 293 LBS | HEIGHT: 68 IN

## 2018-06-12 DIAGNOSIS — Z86.73 HISTORY OF TIA (TRANSIENT ISCHEMIC ATTACK): ICD-10-CM

## 2018-06-12 DIAGNOSIS — E78.5 DYSLIPIDEMIA, GOAL LDL BELOW 100: Primary | ICD-10-CM

## 2018-06-12 DIAGNOSIS — I10 HYPERTENSION GOAL BP (BLOOD PRESSURE) < 130/80: ICD-10-CM

## 2018-06-12 PROCEDURE — 1036F TOBACCO NON-USER: CPT | Performed by: INTERNAL MEDICINE

## 2018-06-12 PROCEDURE — 1111F DSCHRG MED/CURRENT MED MERGE: CPT | Performed by: INTERNAL MEDICINE

## 2018-06-12 PROCEDURE — G8427 DOCREV CUR MEDS BY ELIG CLIN: HCPCS | Performed by: INTERNAL MEDICINE

## 2018-06-12 PROCEDURE — G8417 CALC BMI ABV UP PARAM F/U: HCPCS | Performed by: INTERNAL MEDICINE

## 2018-06-12 PROCEDURE — G8598 ASA/ANTIPLAT THER USED: HCPCS | Performed by: INTERNAL MEDICINE

## 2018-06-12 PROCEDURE — 99213 OFFICE O/P EST LOW 20 MIN: CPT | Performed by: INTERNAL MEDICINE

## 2018-06-12 PROCEDURE — 3017F COLORECTAL CA SCREEN DOC REV: CPT | Performed by: INTERNAL MEDICINE

## 2018-06-12 NOTE — PROGRESS NOTES
DULCOLAX) 100 MG CAPS Take 100 mg by mouth 2 times daily as needed for Constipation 60 capsule 0    clopidogrel (PLAVIX) 75 MG tablet Take 1 tablet by mouth daily 30 tablet 3    aspirin 81 MG chewable tablet Take 1 tablet by mouth daily 30 tablet 3    potassium chloride (MICRO-K) 10 MEQ extended release capsule Take 20 mEq by mouth daily      Ascorbic Acid (VITAMIN C) 250 MG tablet Take 250 mg by mouth daily      Omega-3 Fatty Acids (FISH OIL) 1000 MG CAPS Take 3,000 mg by mouth daily      VITAMIN E-400 PO Take 1 capsule by mouth daily      ferrous sulfate 325 (65 FE) MG tablet Take 1 tablet by mouth 3 times daily (with meals). 90 tablet 0    ergocalciferol (ERGOCALCIFEROL) 86833 UNITS capsule Take 1 capsule by mouth once a week. (Patient taking differently: Take 50,000 Units by mouth once a week Wednesdays) 10 capsule 0    amLODIPine (NORVASC) 5 MG tablet Take 5 mg by mouth daily.  pravastatin (PRAVACHOL) 80 MG tablet Take 80 mg by mouth daily.  hydrALAZINE (APRESOLINE) 25 MG tablet Take 25 mg by mouth 3 times daily.  atenolol (TENORMIN) 100 MG tablet Take 100 mg by mouth. 1/2 tab daily        No current facility-administered medications for this visit. Allergies   Allergen Reactions    Codeine Nausea And Vomiting     Patient states it can make her vomit.  Vicodin [Hydrocodone-Acetaminophen] Nausea And Vomiting         Review of Systems  General ROS: negative  Psychological ROS: negative  Hematological and Lymphatic ROS: No history of blood clots or bleeding disorder.    Respiratory ROS: no cough, shortness of breath, or wheezing  Cardiovascular ROS: no chest pain or dyspnea on exertion  Gastrointestinal ROS: negative  Genito-Urinary ROS: no dysuria, trouble voiding, or hematuria  Musculoskeletal ROS: negative  Neurological ROS: no TIA or stroke symptoms  Dermatological ROS: negative      Ht 5' 8\" (1.727 m)   Wt (!) 393 lb 11.2 oz (178.6 kg)   BMI 59.86 kg/m²   /70 mmhg, HR - 67 bpm    Physical Examination: General appearance - alert, well appearing, and in no distress  Mental status - alert, oriented to person, place, and time  Neck - supple, no significant adenopathy, no JVD, or carotid bruits  Chest - clear to auscultation, no wheezes, rales or rhonchi  Heart - normal rate, regular rhythm, normal S1, S2, no murmurs  Abdomen - soft, nontender, nondistended, no masses   Neurological - alert, oriented, normal speech, no focal findings   Musculoskeletal - no joint tenderness, deformity or swelling  Extremities - peripheral pulses normal, no pedal edema  Skin - normal coloration and turgor, no rashes      EKG:  NSR    Echo:   Results for orders placed during the hospital encounter of 06/07/18   Echocardiogram complete 2D with doppler with color    Narrative Transthoracic Echocardiography Report (TTE)     Demographics      Patient Name    Joe Mcgarry Gender               Female      MR #            [id number]      Race                 Black                                     Ethnicity      Account #       [de-identified]      Room Number          0017      Accession       [id number]      Date of Study        06/07/2018   Number      Date of Birth   1955     Referring Physician  Vini Whiteside MD      Age             57 year(s)     [address] [facility]                                     Interpreting         Echo reader of the                                  Physician            week                                                       Caitie Jones MD     Procedure    Type of Study      TTE procedure:ECHOCARDIOGRAM COMPLETE 2D W DOPPLER W COLOR. Procedure Date  Date: 06/07/2018 Start: 10:31 AM    Study Location: Bedside  Technical Quality: Limited visualization due to body habitus. Indications:Possible TIA.     Additional Medical History:CVA, hypertension, CAD, diabetic, morbid ischemia      Cta Head W Wo Contrast (code Stroke Nihss 4 Or Above)    Result Date: 6/7/2018  PROCEDURE: CTA HEAD W WO CONTRAST CLINICAL INFORMATION: right sided weakness and paresthesias. COMPARISON: CT brain June 7, 2018 TECHNIQUE: 1 mm CT axial images were obtained through the head after the fast bolus administration of contrast. A noncontrast localizer was obtained. 3-D reconstructions were performed on a dedicated 3-D workstation. These include multiplanar MPR images and multiplanar MIP images. 100 mL Isovue-370 intravenous contrast was given. All CT scans at this facility use dose modulation, iterative reconstruction, and/or weight-based dosing when appropriate to reduce radiation dose to as low as reasonably achievable. FINDINGS:  There is a heterogeneous nodule the left thyroid measuring 4.5 cm. Is produces rightward tracheal deviation. The aortic arch is otherwise unremarkable. Left and right carotid artery appear patent with atherosclerotic changes at the bifurcation bilaterally right greater than left. The left and right internal carotid remain patent to the skull base and form a normal confluence of the anterior and middle cerebral arteries of the [place]. Internal carotid arteries: There is atherosclerosis of the proximal internal carotid artery at near the bifurcation. There is a normal continuation of the internal carotid through the skull base and a normal confluence with the anterior and middle cerebral artery is noted Middle cerebral arteries: The left and right middle cerebral arteries are widely patent. The anterior cerebral arteries demonstrate normal origin and are widely patent and the A1 and A2 segments. Anterior cerebral arteries: The intracanicular artery is normal. The posterior cerebral arteries originate from the vertebrobasilar junction in the customary fashion. The major dural venous sinuses are patent. 1. Grossly normal CT angiography of the neck.  2. Grossly normal CT thyroid compatible with bilateral thyromegaly. Follow-up sonography should be considered. Negative exam for acute fracture the cervical spine or deformity of the central canal. 2. Degenerative changes throughout the cervical spine noted. 3. Marked thyroid hypertrophy. **This report has been created using voice recognition software. It may contain minor errors which are inherent in voice recognition technology. ** Final report electronically signed by Dr. Laurie Jiang on 6/9/2018 2:06 AM    Mri Brain Without Contrast    Result Date: 6/7/2018  PROCEDURE: MRI BRAIN WO CONTRAST CLINICAL INFORMATION r/o CVA. Right upper and lower extremity weakness. Dizziness. COMPARISON: CT head and CTA head and neck 6/7/2018. TECHNIQUE: Multiplanar and multiple spin echo MRI images were obtained of the brain without contrast. FINDINGS: The diffusion-weighted images are normal.  The brain volume is normal. There is a mild-moderate amount of signal hyperintensity on the FLAIR and T2-weighted sequences in the white matter of the brain. This is consistent with chronic small vessel ischemic  changes. There is a tiny old infarct in the right thalamus. There are small old infarcts in the basal ganglia bilaterally. There are small old infarcts in the reece. There is an old infarct/injury in the anterior aspect of the corpus callosum. There are no intra-or extra-axial collections. There is no hydrocephalus, midline shift or mass effect. There is mineralization in the medial aspects of the basal ganglia bilaterally. There is an old microhemorrhage in the superior aspect of the left thalamus. There is an old microhemorrhage in the right parietal lobe. The major intracranial vascular flow voids are present. The midline craniocervical junction structures are normal.  There is a partially empty sella turcica. 1. No evidence of an acute infarct. 2. Mild to moderate severity chronic small vessel ischemic changes.  3. Multiple small old

## 2018-06-12 NOTE — PROGRESS NOTES
Patient here for hospital follow up-loop recorder    Pt denies:chest pain,dizziness,palpitations    Pt complains of: shortness of breathe,peripheral edema

## 2018-06-27 ENCOUNTER — TELEPHONE (OUTPATIENT)
Dept: CARDIOLOGY CLINIC | Age: 63
End: 2018-06-27

## 2018-07-13 ENCOUNTER — PROCEDURE VISIT (OUTPATIENT)
Dept: CARDIOLOGY CLINIC | Age: 63
End: 2018-07-13
Payer: MEDICARE

## 2018-07-13 DIAGNOSIS — Z45.09 ENCOUNTER FOR ELECTRONIC ANALYSIS OF REVEAL EVENT RECORDER: Primary | ICD-10-CM

## 2018-07-13 PROCEDURE — 93298 REM INTERROG DEV EVAL SCRMS: CPT | Performed by: INTERNAL MEDICINE

## 2018-07-13 PROCEDURE — 93299 PR REM INTERROG ICPMS/SCRMS <30 D TECH REVIEW: CPT | Performed by: INTERNAL MEDICINE

## 2018-07-28 ENCOUNTER — HOSPITAL ENCOUNTER (EMERGENCY)
Age: 63
Discharge: HOME | End: 2018-07-28
Attending: EMERGENCY MEDICINE
Payer: MEDICARE

## 2018-07-28 ENCOUNTER — APPOINTMENT (OUTPATIENT)
Dept: INTERVENTIONAL RADIOLOGY/VASCULAR | Age: 63
End: 2018-07-28
Payer: MEDICARE

## 2018-07-28 VITALS
RESPIRATION RATE: 18 BRPM | HEIGHT: 68 IN | BODY MASS INDEX: 44.41 KG/M2 | OXYGEN SATURATION: 99 % | TEMPERATURE: 98.8 F | DIASTOLIC BLOOD PRESSURE: 61 MMHG | HEART RATE: 79 BPM | WEIGHT: 293 LBS | SYSTOLIC BLOOD PRESSURE: 158 MMHG

## 2018-07-28 DIAGNOSIS — M79.642 HAND PAIN, LEFT: Primary | ICD-10-CM

## 2018-07-28 LAB
ANION GAP SERPL CALCULATED.3IONS-SCNC: 14 MEQ/L (ref 8–16)
BASOPHILS # BLD: 0.5 %
BASOPHILS ABSOLUTE: 0 THOU/MM3 (ref 0–0.1)
BUN BLDV-MCNC: 14 MG/DL (ref 7–22)
CALCIUM SERPL-MCNC: 9.4 MG/DL (ref 8.5–10.5)
CHLORIDE BLD-SCNC: 99 MEQ/L (ref 98–111)
CO2: 24 MEQ/L (ref 23–33)
CREAT SERPL-MCNC: 1 MG/DL (ref 0.4–1.2)
EKG ATRIAL RATE: 76 BPM
EKG P AXIS: 36 DEGREES
EKG P-R INTERVAL: 182 MS
EKG Q-T INTERVAL: 398 MS
EKG QRS DURATION: 84 MS
EKG QTC CALCULATION (BAZETT): 447 MS
EKG R AXIS: -6 DEGREES
EKG T AXIS: 12 DEGREES
EKG VENTRICULAR RATE: 76 BPM
EOSINOPHIL # BLD: 1.2 %
EOSINOPHILS ABSOLUTE: 0.1 THOU/MM3 (ref 0–0.4)
ERYTHROCYTE [DISTWIDTH] IN BLOOD BY AUTOMATED COUNT: 14.5 % (ref 11.5–14.5)
ERYTHROCYTE [DISTWIDTH] IN BLOOD BY AUTOMATED COUNT: 41.9 FL (ref 35–45)
GFR SERPL CREATININE-BSD FRML MDRD: 67 ML/MIN/1.73M2
GLUCOSE BLD-MCNC: 206 MG/DL (ref 70–108)
HCT VFR BLD CALC: 47.9 % (ref 37–47)
HEMOGLOBIN: 16 GM/DL (ref 12–16)
IMMATURE GRANS (ABS): 0.05 THOU/MM3 (ref 0–0.07)
IMMATURE GRANULOCYTES: 0.7 %
LYMPHOCYTES # BLD: 29.2 %
LYMPHOCYTES ABSOLUTE: 2.2 THOU/MM3 (ref 1–4.8)
MCH RBC QN AUTO: 27.1 PG (ref 26–33)
MCHC RBC AUTO-ENTMCNC: 33.4 GM/DL (ref 32.2–35.5)
MCV RBC AUTO: 81 FL (ref 81–99)
MONOCYTES # BLD: 8.9 %
MONOCYTES ABSOLUTE: 0.7 THOU/MM3 (ref 0.4–1.3)
NUCLEATED RED BLOOD CELLS: 0 /100 WBC
OSMOLALITY CALCULATION: 280.3 MOSMOL/KG (ref 275–300)
PLATELET # BLD: 251 THOU/MM3 (ref 130–400)
PMV BLD AUTO: 9.6 FL (ref 9.4–12.4)
POTASSIUM SERPL-SCNC: 3.9 MEQ/L (ref 3.5–5.2)
RBC # BLD: 5.91 MILL/MM3 (ref 4.2–5.4)
SEG NEUTROPHILS: 59.5 %
SEGMENTED NEUTROPHILS ABSOLUTE COUNT: 4.4 THOU/MM3 (ref 1.8–7.7)
SODIUM BLD-SCNC: 137 MEQ/L (ref 135–145)
TOTAL CK: 100 U/L (ref 30–135)
TROPONIN T: < 0.01 NG/ML
WBC # BLD: 7.4 THOU/MM3 (ref 4.8–10.8)

## 2018-07-28 PROCEDURE — 84484 ASSAY OF TROPONIN QUANT: CPT

## 2018-07-28 PROCEDURE — 93971 EXTREMITY STUDY: CPT

## 2018-07-28 PROCEDURE — 80048 BASIC METABOLIC PNL TOTAL CA: CPT

## 2018-07-28 PROCEDURE — 96372 THER/PROPH/DIAG INJ SC/IM: CPT

## 2018-07-28 PROCEDURE — 6360000002 HC RX W HCPCS: Performed by: EMERGENCY MEDICINE

## 2018-07-28 PROCEDURE — 36415 COLL VENOUS BLD VENIPUNCTURE: CPT

## 2018-07-28 PROCEDURE — 99284 EMERGENCY DEPT VISIT MOD MDM: CPT

## 2018-07-28 PROCEDURE — 85025 COMPLETE CBC W/AUTO DIFF WBC: CPT

## 2018-07-28 PROCEDURE — 6370000000 HC RX 637 (ALT 250 FOR IP): Performed by: EMERGENCY MEDICINE

## 2018-07-28 PROCEDURE — 82550 ASSAY OF CK (CPK): CPT

## 2018-07-28 RX ORDER — TRAMADOL HYDROCHLORIDE 50 MG/1
50 TABLET ORAL EVERY 8 HOURS PRN
Qty: 9 TABLET | Refills: 0 | Status: SHIPPED | OUTPATIENT
Start: 2018-07-28 | End: 2018-07-31

## 2018-07-28 RX ORDER — HYDROCODONE BITARTRATE AND ACETAMINOPHEN 5; 325 MG/1; MG/1
1 TABLET ORAL ONCE
Status: COMPLETED | OUTPATIENT
Start: 2018-07-28 | End: 2018-07-28

## 2018-07-28 RX ORDER — ORPHENADRINE CITRATE 30 MG/ML
60 INJECTION INTRAMUSCULAR; INTRAVENOUS ONCE
Status: COMPLETED | OUTPATIENT
Start: 2018-07-28 | End: 2018-07-28

## 2018-07-28 RX ADMIN — ORPHENADRINE CITRATE 60 MG: 30 INJECTION INTRAMUSCULAR; INTRAVENOUS at 15:37

## 2018-07-28 RX ADMIN — HYDROCODONE BITARTRATE AND ACETAMINOPHEN 1 TABLET: 5; 325 TABLET ORAL at 15:37

## 2018-07-28 ASSESSMENT — PAIN DESCRIPTION - PAIN TYPE: TYPE: ACUTE PAIN

## 2018-07-28 ASSESSMENT — PAIN SCALES - GENERAL
PAINLEVEL_OUTOF10: 10

## 2018-07-28 ASSESSMENT — PAIN DESCRIPTION - DIRECTION: RADIATING_TOWARDS: UPPER ARM

## 2018-07-28 ASSESSMENT — PAIN DESCRIPTION - ORIENTATION: ORIENTATION: LEFT

## 2018-07-28 ASSESSMENT — PAIN DESCRIPTION - LOCATION: LOCATION: HAND

## 2018-07-28 ASSESSMENT — PAIN DESCRIPTION - DESCRIPTORS: DESCRIPTORS: SHARP;SHOOTING;RADIATING

## 2018-07-28 ASSESSMENT — PAIN DESCRIPTION - FREQUENCY: FREQUENCY: CONTINUOUS

## 2018-07-28 NOTE — ED PROVIDER NOTES
[facility] EMERGENCY DEPT      CHIEF COMPLAINT       Chief Complaint   Patient presents with    Hand Pain     Left       Nurses Notes reviewed and I agree except as noted in the HPI. HISTORY OF PRESENT ILLNESS    Heidi Ware is a 70 y.o. female who presents for evaluation of left hand pain and swelling for the past several days. The patient also c/o intermittent LUE numbness. She states the pain is a constant throbbing pain that worsens with certain movements. She has a h/o CAD, hypertension, GERD, and MI. She denies any injury or trauma, weakness, chest pain, shortness of breath, fever, or chills. No further complaints at the time of the initial encounter. REVIEW OF SYSTEMS      Review of Systems   Constitutional: Negative for fever, chills, diaphoresis and fatigue. HENT: Negative for congestion, drooling, facial swelling and sore throat. Eyes: Negative for photophobia, pain and discharge. Respiratory: Negative for cough, shortness of breath, wheezing and stridor. Cardiovascular: Negative for chest pain, palpitations and leg swelling. Gastrointestinal: Negative for abdominal pain, blood in stool and abdominal distention. Endocrine: Negative for cold intolerance, heat intolerance, polydipsia and polyuria. Genitourinary: Negative for dysuria, urgency, hematuria and difficulty urinating. Musculoskeletal: LUE pain and swelling. Negative for gait problem, neck pain and neck stiffness. Allergic/Immunologic: Negative for environmental allergies, food allergies and immunocompromised state. Skin; No rash, No itching  Neurological: LUE numbness. Negative for seizures, and weakness. Hematological: Negative for adenopathy. Does not bruise/bleed easily. Psychiatric/Behavioral: Negative for hallucinations, confusion and agitation. PAST MEDICAL HISTORY    has a past medical history of CAD (coronary artery disease);  Degenerative lumbar spinal stenosis; GERD (gastroesophageal reflux Emergency Department Physician who either signs or Co-signs this chart in the absence of a cardiologist.  EKG read and interpreted by myself with comparison to 7-Jun-2018 gives impression of sinus rhythm with premature atrial complexes with heart rate of 76; interval 182; QRS 84;QTc 447; axis -6. Inferior infarct. No STEMI. RADIOLOGY: non-plain film images(s) such as CT, Ultrasound and MRI are read by the radiologist.  Plain radiographic images are visualized and preliminarily interpreted by the emergency physician unless otherwise stated below. VL DUP UPPER EXTREMITY VENOUS LEFT   Final Result   1. There is no deep venous thrombosis within the left upper extremity. **This report has been created using voice recognition software. It may contain minor errors which are inherent in voice recognition technology. **      Final report electronically signed by Dr. Javid Arguello on 7/28/2018 5:01 PM           LABS:   Labs Reviewed   CBC WITH AUTO DIFFERENTIAL - Abnormal; Notable for the following:        Result Value    RBC 5.91 (*)     Hematocrit 47.9 (*)     All other components within normal limits   BASIC METABOLIC PANEL - Abnormal; Notable for the following:     Glucose 206 (*)     All other components within normal limits   GLOMERULAR FILTRATION RATE, ESTIMATED - Abnormal; Notable for the following:     Est, Glom Filt Rate 67 (*)     All other components within normal limits   CK   TROPONIN   ANION GAP   OSMOLALITY       EMERGENCY DEPARTMENT COURSE:   Vitals:    Vitals:    07/28/18 1409 07/28/18 1429 07/28/18 1430 07/28/18 1540   BP: (!) 170/97   (!) 158/61   Pulse: (!) 30  81 79   Resp: 18 18  18   Temp: 98.8 °F (37.1 °C)      TempSrc: Oral      SpO2: 100% 99%  99%   Weight: (!) 350 lb (158.8 kg)      Height: 5' 8\" (1.727 m)        MDM; Patient presenting with complaint of pain to right upper extremity, mild swelling noted.   Patient states that she woke up with symptoms sometime last night around

## 2018-07-28 NOTE — ED NOTES
Pt transferred from intake to room 6 for bradycardia. Pt ambulated from wheelchair to bed and is noted to be short of breath. EKG completed, pulse rate 78. Radial pulse matches closely. Pt is very irregular. Dr Heather Peña in room assessing pt.      Cornelio Vasquez RN  07/28/18 [id number]

## 2018-07-28 NOTE — ED NOTES
Reassessment of the patients Hand Pain (Left)   is unchanged, the patients pain reassessment is a 10/10, Side rails up times 2, call light in reach, will continue to monitor.        Soham Herrera RN  07/28/18 [id number]

## 2018-07-28 NOTE — ED PROVIDER NOTES
[facility] Triage Note-Initiation of of Medical Screening Exam      Mike Chau is a 57 y.o. female who presents to the Emergency Department with complaint of left hand pain. Patient reports this pain radiates into the arm. Patient denies any known injuries. Patient reports she is concerned for a spider bite due to having spiders in her house. Patient reports she is right handed. No other complaints at this time. Brief Exam  Upon physical examination, patient's heart rate was consistently in the 30's, and blood pressure was 170/97, so patient will be moved to a higher acuity of care for further workup. The patient was initially triaged and workup initiated by Jim Mcwilliams PA-C. Please see provider dictation for full History and Physical for further details of the patient's visit today.          DIMITRI Marie  07/28/18 2924

## 2018-08-21 ENCOUNTER — TELEPHONE (OUTPATIENT)
Dept: CARDIOLOGY CLINIC | Age: 63
End: 2018-08-21

## 2018-08-27 ENCOUNTER — TELEPHONE (OUTPATIENT)
Dept: CARDIOLOGY CLINIC | Age: 63
End: 2018-08-27

## 2018-08-27 ENCOUNTER — PROCEDURE VISIT (OUTPATIENT)
Dept: CARDIOLOGY CLINIC | Age: 63
End: 2018-08-27
Payer: MEDICARE

## 2018-08-27 DIAGNOSIS — R00.2 PALPITATIONS: ICD-10-CM

## 2018-08-27 DIAGNOSIS — I63.9 CEREBROVASCULAR ACCIDENT (CVA), UNSPECIFIED MECHANISM (HCC): Primary | ICD-10-CM

## 2018-08-27 PROCEDURE — 93299 PR REM INTERROG ICPMS/SCRMS <30 D TECH REVIEW: CPT | Performed by: INTERNAL MEDICINE

## 2018-08-27 PROCEDURE — 93298 REM INTERROG DEV EVAL SCRMS: CPT | Performed by: INTERNAL MEDICINE

## 2018-09-25 ENCOUNTER — HOSPITAL ENCOUNTER (OUTPATIENT)
Dept: MRI IMAGING | Age: 63
Discharge: HOME | End: 2018-09-25
Payer: MEDICARE

## 2018-09-25 DIAGNOSIS — M19.011 PRIMARY OSTEOARTHRITIS OF RIGHT SHOULDER: ICD-10-CM

## 2018-09-25 PROCEDURE — 73221 MRI JOINT UPR EXTREM W/O DYE: CPT

## 2018-10-02 ENCOUNTER — TELEPHONE (OUTPATIENT)
Dept: CARDIOLOGY CLINIC | Age: 63
End: 2018-10-02

## 2018-10-03 ENCOUNTER — PROCEDURE VISIT (OUTPATIENT)
Dept: CARDIOLOGY CLINIC | Age: 63
End: 2018-10-03
Payer: MEDICARE

## 2018-10-03 DIAGNOSIS — I63.9 CRYPTOGENIC STROKE (HCC): Primary | ICD-10-CM

## 2018-10-03 PROCEDURE — 93299 PR REM INTERROG ICPMS/SCRMS <30 D TECH REVIEW: CPT | Performed by: INTERNAL MEDICINE

## 2018-10-03 PROCEDURE — 93298 REM INTERROG DEV EVAL SCRMS: CPT | Performed by: INTERNAL MEDICINE

## 2018-10-09 ENCOUNTER — OFFICE VISIT (OUTPATIENT)
Dept: CARDIOLOGY CLINIC | Age: 63
End: 2018-10-09
Payer: MEDICARE

## 2018-10-09 VITALS
HEART RATE: 76 BPM | HEIGHT: 68 IN | DIASTOLIC BLOOD PRESSURE: 83 MMHG | SYSTOLIC BLOOD PRESSURE: 136 MMHG | BODY MASS INDEX: 44.41 KG/M2 | WEIGHT: 293 LBS

## 2018-10-09 DIAGNOSIS — R60.0 BILATERAL LEG EDEMA: ICD-10-CM

## 2018-10-09 DIAGNOSIS — I10 ESSENTIAL HYPERTENSION: ICD-10-CM

## 2018-10-09 DIAGNOSIS — R06.02 SOB (SHORTNESS OF BREATH) ON EXERTION: ICD-10-CM

## 2018-10-09 DIAGNOSIS — Z01.818 PRE-OP EVALUATION: Primary | ICD-10-CM

## 2018-10-09 PROCEDURE — 1101F PT FALLS ASSESS-DOCD LE1/YR: CPT | Performed by: INTERNAL MEDICINE

## 2018-10-09 PROCEDURE — G8417 CALC BMI ABV UP PARAM F/U: HCPCS | Performed by: INTERNAL MEDICINE

## 2018-10-09 PROCEDURE — 99214 OFFICE O/P EST MOD 30 MIN: CPT | Performed by: INTERNAL MEDICINE

## 2018-10-09 PROCEDURE — G8427 DOCREV CUR MEDS BY ELIG CLIN: HCPCS | Performed by: INTERNAL MEDICINE

## 2018-10-09 PROCEDURE — 1123F ACP DISCUSS/DSCN MKR DOCD: CPT | Performed by: INTERNAL MEDICINE

## 2018-10-09 PROCEDURE — 1090F PRES/ABSN URINE INCON ASSESS: CPT | Performed by: INTERNAL MEDICINE

## 2018-10-09 PROCEDURE — 3017F COLORECTAL CA SCREEN DOC REV: CPT | Performed by: INTERNAL MEDICINE

## 2018-10-09 PROCEDURE — G8400 PT W/DXA NO RESULTS DOC: HCPCS | Performed by: INTERNAL MEDICINE

## 2018-10-09 PROCEDURE — G8598 ASA/ANTIPLAT THER USED: HCPCS | Performed by: INTERNAL MEDICINE

## 2018-10-09 PROCEDURE — 4040F PNEUMOC VAC/ADMIN/RCVD: CPT | Performed by: INTERNAL MEDICINE

## 2018-10-09 PROCEDURE — 1036F TOBACCO NON-USER: CPT | Performed by: INTERNAL MEDICINE

## 2018-10-09 PROCEDURE — 93000 ELECTROCARDIOGRAM COMPLETE: CPT | Performed by: INTERNAL MEDICINE

## 2018-10-09 PROCEDURE — G8484 FLU IMMUNIZE NO ADMIN: HCPCS | Performed by: INTERNAL MEDICINE

## 2018-10-09 RX ORDER — FUROSEMIDE 20 MG/1
20 TABLET ORAL DAILY PRN
COMMUNITY
End: 2020-12-12

## 2018-10-09 NOTE — PROGRESS NOTES
visit. Review of Systems -     General ROS: negative  Psychological ROS: negative  Hematological and Lymphatic ROS: No history of blood clots or bleeding disorder. Respiratory ROS: no cough, shortness of breath, or wheezing  Cardiovascular ROS: no chest pain or dyspnea on exertion  Gastrointestinal ROS: negative  Genito-Urinary ROS: no dysuria, trouble voiding, or hematuria  Musculoskeletal ROS: negative  Neurological ROS: no TIA or stroke symptoms  Dermatological ROS: negative      Blood pressure 136/83, pulse 76, height 5' 8\" (1.727 m), weight (!) 350 lb (158.8 kg). Physical Examination:    General appearance - alert, well appearing, and in no distress  Mental status - alert, oriented to person, place, and time  Neck - supple, no significant adenopathy, no JVD, or carotid bruits  Chest - clear to auscultation, no wheezes, rales or rhonchi, symmetric air entry  Heart - normal rate, regular rhythm, normal S1, S2, no murmurs, rubs, clicks or gallops  Abdomen - soft, nontender, nondistended, no masses or organomegaly  Neurological - alert, oriented, normal speech, no focal findings or movement disorder noted  Musculoskeletal - no joint tenderness, deformity or swelling  Extremities - peripheral pulses normal, no pedal edema, no clubbing or cyanosis  Skin - normal coloration and turgor, no rashes, no suspicious skin lesions noted    Lab  No results for input(s): CKTOTAL, CKMB, CKMBINDEX, TROPONINI in the last 72 hours.   CBC:   Lab Results   Component Value Date    WBC 7.4 07/28/2018    RBC 5.91 07/28/2018    HGB 16.0 07/28/2018    HCT 47.9 07/28/2018    MCV 81.0 07/28/2018    MCH 27.1 07/28/2018    MCHC 33.4 07/28/2018    RDW 15.0 06/09/2018     07/28/2018    MPV 9.6 07/28/2018     BMP:    Lab Results   Component Value Date     07/28/2018    K 3.9 07/28/2018    CL 99 07/28/2018    CO2 24 07/28/2018    BUN 14 07/28/2018    LABALBU 3.6 06/07/2018    CREATININE 1.0 07/28/2018    CALCIUM 9.4

## 2018-11-08 ENCOUNTER — PROCEDURE VISIT (OUTPATIENT)
Dept: CARDIOLOGY CLINIC | Age: 63
End: 2018-11-08
Payer: MEDICARE

## 2018-11-08 DIAGNOSIS — Z86.73 HISTORY OF CVA (CEREBROVASCULAR ACCIDENT): Primary | ICD-10-CM

## 2018-11-08 PROBLEM — Z01.818 PRE-OP EVALUATION: Status: RESOLVED | Noted: 2018-10-09 | Resolved: 2018-11-08

## 2018-11-08 PROCEDURE — 93299 PR REM INTERROG ICPMS/SCRMS <30 D TECH REVIEW: CPT | Performed by: INTERNAL MEDICINE

## 2018-11-08 PROCEDURE — 93298 REM INTERROG DEV EVAL SCRMS: CPT | Performed by: INTERNAL MEDICINE

## 2018-12-03 ENCOUNTER — TELEPHONE (OUTPATIENT)
Dept: CARDIOLOGY CLINIC | Age: 63
End: 2018-12-03

## 2018-12-17 ENCOUNTER — PROCEDURE VISIT (OUTPATIENT)
Dept: CARDIOLOGY CLINIC | Age: 63
End: 2018-12-17
Payer: MEDICARE

## 2018-12-17 DIAGNOSIS — I63.00 CEREBROVASCULAR ACCIDENT (CVA) DUE TO THROMBOSIS OF PRECEREBRAL ARTERY (HCC): Primary | ICD-10-CM

## 2018-12-17 PROCEDURE — 93298 REM INTERROG DEV EVAL SCRMS: CPT | Performed by: INTERNAL MEDICINE

## 2018-12-17 PROCEDURE — 93299 PR REM INTERROG ICPMS/SCRMS <30 D TECH REVIEW: CPT | Performed by: INTERNAL MEDICINE

## 2019-01-18 ENCOUNTER — PROCEDURE VISIT (OUTPATIENT)
Dept: CARDIOLOGY CLINIC | Age: 64
End: 2019-01-18
Payer: MEDICARE

## 2019-01-18 DIAGNOSIS — I63.9 ACUTE CVA (CEREBROVASCULAR ACCIDENT) (HCC): Primary | ICD-10-CM

## 2019-01-18 PROCEDURE — 93298 REM INTERROG DEV EVAL SCRMS: CPT | Performed by: INTERNAL MEDICINE

## 2019-01-18 PROCEDURE — 93299 PR REM INTERROG ICPMS/SCRMS <30 D TECH REVIEW: CPT | Performed by: INTERNAL MEDICINE

## 2019-02-25 ENCOUNTER — PROCEDURE VISIT (OUTPATIENT)
Dept: CARDIOLOGY CLINIC | Age: 64
End: 2019-02-25
Payer: MEDICARE

## 2019-02-25 DIAGNOSIS — I63.9 ACUTE CVA (CEREBROVASCULAR ACCIDENT) (HCC): Primary | ICD-10-CM

## 2019-02-25 PROCEDURE — 93299 PR REM INTERROG ICPMS/SCRMS <30 D TECH REVIEW: CPT | Performed by: INTERNAL MEDICINE

## 2019-02-25 PROCEDURE — 93298 REM INTERROG DEV EVAL SCRMS: CPT | Performed by: INTERNAL MEDICINE

## 2019-03-12 ENCOUNTER — TELEPHONE (OUTPATIENT)
Dept: CARDIOLOGY CLINIC | Age: 64
End: 2019-03-12

## 2019-04-01 ENCOUNTER — PROCEDURE VISIT (OUTPATIENT)
Dept: CARDIOLOGY CLINIC | Age: 64
End: 2019-04-01
Payer: MEDICARE

## 2019-04-01 DIAGNOSIS — I63.00 CEREBROVASCULAR ACCIDENT (CVA) DUE TO THROMBOSIS OF PRECEREBRAL ARTERY (HCC): Primary | ICD-10-CM

## 2019-04-01 PROCEDURE — 93298 REM INTERROG DEV EVAL SCRMS: CPT | Performed by: INTERNAL MEDICINE

## 2019-04-01 PROCEDURE — 93299 PR REM INTERROG ICPMS/SCRMS <30 D TECH REVIEW: CPT | Performed by: INTERNAL MEDICINE

## 2019-04-11 ENCOUNTER — OFFICE VISIT (OUTPATIENT)
Dept: CARDIOLOGY CLINIC | Age: 64
End: 2019-04-11
Payer: COMMERCIAL

## 2019-04-11 VITALS
SYSTOLIC BLOOD PRESSURE: 141 MMHG | HEIGHT: 68 IN | HEART RATE: 80 BPM | DIASTOLIC BLOOD PRESSURE: 86 MMHG | BODY MASS INDEX: 44.41 KG/M2 | WEIGHT: 293 LBS

## 2019-04-11 DIAGNOSIS — Z01.818 PRE-OP EVALUATION: Primary | ICD-10-CM

## 2019-04-11 DIAGNOSIS — R06.02 SHORTNESS OF BREATH: ICD-10-CM

## 2019-04-11 DIAGNOSIS — E66.01 MORBID OBESITY DUE TO EXCESS CALORIES (HCC): ICD-10-CM

## 2019-04-11 DIAGNOSIS — R60.0 BILATERAL LEG EDEMA: ICD-10-CM

## 2019-04-11 DIAGNOSIS — I10 ESSENTIAL HYPERTENSION: ICD-10-CM

## 2019-04-11 PROBLEM — R07.9 CHEST PAIN: Status: RESOLVED | Noted: 2017-05-31 | Resolved: 2019-04-11

## 2019-04-11 PROCEDURE — 1090F PRES/ABSN URINE INCON ASSESS: CPT | Performed by: INTERNAL MEDICINE

## 2019-04-11 PROCEDURE — G8427 DOCREV CUR MEDS BY ELIG CLIN: HCPCS | Performed by: INTERNAL MEDICINE

## 2019-04-11 PROCEDURE — G8400 PT W/DXA NO RESULTS DOC: HCPCS | Performed by: INTERNAL MEDICINE

## 2019-04-11 PROCEDURE — 3017F COLORECTAL CA SCREEN DOC REV: CPT | Performed by: INTERNAL MEDICINE

## 2019-04-11 PROCEDURE — 1123F ACP DISCUSS/DSCN MKR DOCD: CPT | Performed by: INTERNAL MEDICINE

## 2019-04-11 PROCEDURE — 99214 OFFICE O/P EST MOD 30 MIN: CPT | Performed by: INTERNAL MEDICINE

## 2019-04-11 PROCEDURE — G8417 CALC BMI ABV UP PARAM F/U: HCPCS | Performed by: INTERNAL MEDICINE

## 2019-04-11 PROCEDURE — 4040F PNEUMOC VAC/ADMIN/RCVD: CPT | Performed by: INTERNAL MEDICINE

## 2019-04-11 PROCEDURE — 1036F TOBACCO NON-USER: CPT | Performed by: INTERNAL MEDICINE

## 2019-04-11 PROCEDURE — G8599 NO ASA/ANTIPLAT THER USE RNG: HCPCS | Performed by: INTERNAL MEDICINE

## 2019-04-11 NOTE — PROGRESS NOTES
Chief Complaint   Patient presents with    Pre-op Exam    Hypertension   Former pat of Dr. Huber Cool  Patient with past medical history of HTN, dyslipidemia is here for the follow-up after hospital discharge. She was admitted with TIA. Negative neuro work up. No ASD or PFO. ILR was inserted for arrhythmias assessment in view of CVA       PRE-OP FOR RT TOTAL SHOULDER WITH DR BOYLE NOT YET SCHEDULED. Denied cp, palpitations, edema or dizziness    Sob on exertion    On wheelchair    EKG done today      Patient Active Problem List   Diagnosis    CVA (cerebral vascular accident), 2/11    HTN (hypertension)    Spondylolisthesis of lumbar region    Degenerative lumbar spinal stenosis    Morbid obesity due to excess calories ([address]    History of CVA (cerebrovascular accident)    CAD (coronary artery disease)    Essential hypertension    Postoperative anemia due to acute blood loss    Hypertension    Diabetes mellitus (HCC)    Episodic confusion    Spinal stenosis, lumbar region, without neurogenic claudication    Diabetes mellitus type 2 in obese (HCC)    Shortness of breath    Acute CVA (cerebrovascular accident) ([address]    Paresthesia    Bilateral leg edema +1 chronic       Past Surgical History:   Procedure Laterality Date    BACK SURGERY      COLONOSCOPY      ENDOSCOPY, COLON, DIAGNOSTIC      JOINT REPLACEMENT Bilateral     knees    KNEE SURGERY  2006    MD ECHO TRANSESOPHAG R-T 2D IMG ACQUISJ I&R ONLY Left 6/8/2018    TRANSESOPHAGEAL ECHOCARDIOGRAM performed by Teto Alvarado MD at [address] Right     VASCULAR SURGERY      left carotid       Allergies   Allergen Reactions    Codeine Nausea And Vomiting     Patient states it can make her vomit.       Vicodin [Hydrocodone-Acetaminophen] Nausea And Vomiting        Family History   Problem Relation Age of Onset    Stroke Mother     Heart Disease Father         Social History     Socioeconomic History    Marital status:      Spouse name: Sammie Peralta Number of children: 3    Years of education: Not on file    Highest education level: Not on file   Occupational History    Occupation: disability   Social Needs    Financial resource strain: Not on file    Food insecurity:     Worry: Not on file     Inability: Not on file   Joppel needs:     Medical: Not on file     Non-medical: Not on file   Tobacco Use    Smoking status: Never Smoker    Smokeless tobacco: Never Used   Substance and Sexual Activity    Alcohol use: Yes     Comment: occasionally    Drug use: No    Sexual activity: Yes     Partners: Male   Lifestyle    Physical activity:     Days per week: Not on file     Minutes per session: Not on file    Stress: Not on file   Relationships    Social connections:     Talks on phone: Not on file     Gets together: Not on file     Attends [employer] service: Not on file     Active member of club or organization: Not on file     Attends meetings of clubs or organizations: Not on file     Relationship status: Not on file    Intimate partner violence:     Fear of current or ex partner: Not on file     Emotionally abused: Not on file     Physically abused: Not on file     Forced sexual activity: Not on file   Other Topics Concern    Not on file   Social History Narrative    Not on file       Current Outpatient Medications   Medication Sig Dispense Refill    furosemide (LASIX) 20 MG tablet Take 20 mg by mouth daily as needed       docusate sodium (COLACE, DULCOLAX) 100 MG CAPS Take 100 mg by mouth 2 times daily as needed for Constipation 60 capsule 0    clopidogrel (PLAVIX) 75 MG tablet Take 1 tablet by mouth daily 30 tablet 3    aspirin 81 MG chewable tablet Take 1 tablet by mouth daily 30 tablet 3    potassium chloride (MICRO-K) 10 MEQ extended release capsule Take 20 mEq by mouth daily      Ascorbic Acid (VITAMIN C) 250 MG tablet Take 250 mg by mouth daily      ferrous sulfate 325 (65 FE) suspicious skin lesions noted    Lab  No results for input(s): CKTOTAL, CKMB, CKMBINDEX, TROPONINI in the last 72 hours. CBC:   Lab Results   Component Value Date    WBC 7.4 07/28/2018    RBC 5.91 07/28/2018    HGB 16.0 07/28/2018    HCT 47.9 07/28/2018    MCV 81.0 07/28/2018    MCH 27.1 07/28/2018    MCHC 33.4 07/28/2018    RDW 15.0 06/09/2018     07/28/2018    MPV 9.6 07/28/2018     BMP:    Lab Results   Component Value Date     07/28/2018    K 3.9 07/28/2018    CL 99 07/28/2018    CO2 24 07/28/2018    BUN 14 07/28/2018    LABALBU 3.6 06/07/2018    CREATININE 1.0 07/28/2018    CALCIUM 9.4 07/28/2018    LABGLOM 67 07/28/2018    GLUCOSE 206 07/28/2018     Hepatic Function Panel:    Lab Results   Component Value Date    ALKPHOS 59 06/07/2018    ALT 16 06/07/2018    AST 18 06/07/2018    PROT 7.0 06/07/2018    BILITOT 0.5 06/07/2018    BILIDIR <0.2 06/07/2018    LABALBU 3.6 06/07/2018     Magnesium:    Lab Results   Component Value Date    MG 1.7 06/07/2018     Warfarin PT/INR:  No components found for: PTPATWAR, PTINRWAR  HgBA1c:    Lab Results   Component Value Date    LABA1C 8.9 05/31/2017     FLP:    Lab Results   Component Value Date    TRIG 81 06/07/2018    HDL 52 06/07/2018    LDLCALC 90 06/07/2018     TSH:    Lab Results   Component Value Date    TSH 2.460 06/09/2018       ekg 10/9/18  NSR, freq PACs    4/10/19  Nsr, FREQ PACS    ILR- INTERROGATION DONE 4/1/19  NO ATR FIB RATHER nsr WITH FREQ PACS  nO ATR FIB NOTED       Assessment   Diagnosis Orders   1. Pre-op evaluation for shoulder surgery     2. Essential hypertension     3. Shortness of breath     4. Morbid obesity due to excess calories ([address]     5. Bilateral leg edema +1 chronic           Plan   The meds and labs reviewed    Continue the current treatment and with constant vigilance to changes in symptoms and also any potential side effects.   Return for care or seek medical attention immediately if symptoms got worse and/or develop new symptoms. Pre op eval for shoulder surgery  Echo and nuc stress negative  Chronic sob on exertion  No chest pain  May proceed with mod risk    D/w the pat    HX OF ILR FOR CRYPTOGENIC cva 2010    LEG EDEMA   ON LASIX PRN    Hypertension, on medical treatment. Seems to be under good control. Patient is compliant with medical treatment.        RTC in 6 months      [facility]

## 2019-05-03 ENCOUNTER — TELEPHONE (OUTPATIENT)
Dept: CARDIOLOGY CLINIC | Age: 64
End: 2019-05-03

## 2019-05-03 ENCOUNTER — PROCEDURE VISIT (OUTPATIENT)
Dept: CARDIOLOGY CLINIC | Age: 64
End: 2019-05-03
Payer: COMMERCIAL

## 2019-05-03 DIAGNOSIS — I63.9 ACUTE CVA (CEREBROVASCULAR ACCIDENT) (HCC): Primary | ICD-10-CM

## 2019-05-03 DIAGNOSIS — R20.9 BILATERAL COLD FEET: ICD-10-CM

## 2019-05-03 DIAGNOSIS — I73.9 CLAUDICATION (HCC): Primary | ICD-10-CM

## 2019-05-03 PROCEDURE — 93299 PR REM INTERROG ICPMS/SCRMS <30 D TECH REVIEW: CPT | Performed by: INTERNAL MEDICINE

## 2019-05-03 PROCEDURE — 93298 REM INTERROG DEV EVAL SCRMS: CPT | Performed by: INTERNAL MEDICINE

## 2019-05-07 ENCOUNTER — TELEPHONE (OUTPATIENT)
Dept: CARDIOLOGY CLINIC | Age: 64
End: 2019-05-07

## 2019-05-17 ENCOUNTER — HOSPITAL ENCOUNTER (OUTPATIENT)
Dept: INTERVENTIONAL RADIOLOGY/VASCULAR | Age: 64
Discharge: HOME | End: 2019-05-17
Payer: COMMERCIAL

## 2019-05-17 DIAGNOSIS — I73.9 CLAUDICATION (HCC): ICD-10-CM

## 2019-05-17 DIAGNOSIS — R20.9 BILATERAL COLD FEET: ICD-10-CM

## 2019-05-17 PROCEDURE — 93925 LOWER EXTREMITY STUDY: CPT

## 2019-05-22 ENCOUNTER — TELEPHONE (OUTPATIENT)
Dept: CARDIOLOGY CLINIC | Age: 64
End: 2019-05-22

## 2019-06-10 ENCOUNTER — PROCEDURE VISIT (OUTPATIENT)
Dept: CARDIOLOGY CLINIC | Age: 64
End: 2019-06-10
Payer: COMMERCIAL

## 2019-06-10 DIAGNOSIS — I63.9 CRYPTOGENIC STROKE (HCC): Primary | ICD-10-CM

## 2019-06-10 PROCEDURE — 93299 PR REM INTERROG ICPMS/SCRMS <30 D TECH REVIEW: CPT | Performed by: INTERNAL MEDICINE

## 2019-06-10 PROCEDURE — 93298 REM INTERROG DEV EVAL SCRMS: CPT | Performed by: INTERNAL MEDICINE

## 2019-06-10 NOTE — PROGRESS NOTES
[employer] reveal   Battery ok  Many episodes of pauses, most I believe are false pauses, sent for Dr Silvia Coates to review

## 2019-07-19 ENCOUNTER — PROCEDURE VISIT (OUTPATIENT)
Dept: CARDIOLOGY CLINIC | Age: 64
End: 2019-07-19
Payer: COMMERCIAL

## 2019-07-19 DIAGNOSIS — I63.9 ACUTE CVA (CEREBROVASCULAR ACCIDENT) (HCC): Primary | ICD-10-CM

## 2019-07-19 PROCEDURE — 93298 REM INTERROG DEV EVAL SCRMS: CPT | Performed by: INTERNAL MEDICINE

## 2019-07-19 PROCEDURE — 93299 PR REM INTERROG ICPMS/SCRMS <30 D TECH REVIEW: CPT | Performed by: INTERNAL MEDICINE

## 2019-08-23 ENCOUNTER — PROCEDURE VISIT (OUTPATIENT)
Dept: CARDIOLOGY CLINIC | Age: 64
End: 2019-08-23
Payer: COMMERCIAL

## 2019-08-23 ENCOUNTER — TELEPHONE (OUTPATIENT)
Dept: CARDIOLOGY CLINIC | Age: 64
End: 2019-08-23

## 2019-08-23 DIAGNOSIS — I63.9 CRYPTOGENIC STROKE (HCC): Primary | ICD-10-CM

## 2019-08-23 PROCEDURE — 93298 REM INTERROG DEV EVAL SCRMS: CPT | Performed by: INTERNAL MEDICINE

## 2019-08-23 PROCEDURE — 93299 PR REM INTERROG ICPMS/SCRMS <30 D TECH REVIEW: CPT | Performed by: INTERNAL MEDICINE

## 2019-09-30 ENCOUNTER — OFFICE VISIT (OUTPATIENT)
Dept: CARDIOLOGY CLINIC | Age: 64
End: 2019-09-30
Payer: COMMERCIAL

## 2019-09-30 VITALS
HEIGHT: 68 IN | BODY MASS INDEX: 44.41 KG/M2 | SYSTOLIC BLOOD PRESSURE: 142 MMHG | WEIGHT: 293 LBS | DIASTOLIC BLOOD PRESSURE: 90 MMHG

## 2019-09-30 DIAGNOSIS — R60.0 BILATERAL LEG EDEMA: ICD-10-CM

## 2019-09-30 DIAGNOSIS — R06.02 SOB (SHORTNESS OF BREATH) ON EXERTION: ICD-10-CM

## 2019-09-30 DIAGNOSIS — I10 ESSENTIAL HYPERTENSION: Primary | ICD-10-CM

## 2019-09-30 PROCEDURE — 99214 OFFICE O/P EST MOD 30 MIN: CPT | Performed by: INTERNAL MEDICINE

## 2019-09-30 PROCEDURE — 1036F TOBACCO NON-USER: CPT | Performed by: INTERNAL MEDICINE

## 2019-09-30 PROCEDURE — 1123F ACP DISCUSS/DSCN MKR DOCD: CPT | Performed by: INTERNAL MEDICINE

## 2019-09-30 PROCEDURE — G8599 NO ASA/ANTIPLAT THER USE RNG: HCPCS | Performed by: INTERNAL MEDICINE

## 2019-09-30 PROCEDURE — 93000 ELECTROCARDIOGRAM COMPLETE: CPT | Performed by: INTERNAL MEDICINE

## 2019-09-30 PROCEDURE — G8400 PT W/DXA NO RESULTS DOC: HCPCS | Performed by: INTERNAL MEDICINE

## 2019-09-30 PROCEDURE — G8417 CALC BMI ABV UP PARAM F/U: HCPCS | Performed by: INTERNAL MEDICINE

## 2019-09-30 PROCEDURE — 3017F COLORECTAL CA SCREEN DOC REV: CPT | Performed by: INTERNAL MEDICINE

## 2019-09-30 PROCEDURE — G8427 DOCREV CUR MEDS BY ELIG CLIN: HCPCS | Performed by: INTERNAL MEDICINE

## 2019-09-30 PROCEDURE — 1090F PRES/ABSN URINE INCON ASSESS: CPT | Performed by: INTERNAL MEDICINE

## 2019-09-30 PROCEDURE — 4040F PNEUMOC VAC/ADMIN/RCVD: CPT | Performed by: INTERNAL MEDICINE

## 2019-09-30 NOTE — PROGRESS NOTES
Chief Complaint   Patient presents with    Cardiac Clearance   Former pat of Dr. Giovanna Slaughter  Patient with past medical history of HTN, dyslipidemia is here for the follow-up after hospital discharge. She was admitted with TIA. Negative neuro work up. No ASD or PFO. ILR was inserted for arrhythmias assessment in view of CVA      Pt did not have list of meds       5 month F/u    No intermittent Claudication    Denied cp, palpitations, edema or dizziness    Sob on exertion    On wheelchair      Patient Active Problem List   Diagnosis    Cryptogenic stroke ([address]    HTN (hypertension)    Spondylolisthesis of lumbar region    Degenerative lumbar spinal stenosis    Morbid obesity due to excess calories ([address]    History of CVA (cerebrovascular accident)    CAD (coronary artery disease)    Essential hypertension    Postoperative anemia due to acute blood loss    Hypertension    Diabetes mellitus (HCC)    Episodic confusion    Spinal stenosis, lumbar region, without neurogenic claudication    Diabetes mellitus type 2 in obese (HCC)    Shortness of breath    Acute CVA (cerebrovascular accident) ([address]    Paresthesia    Bilateral leg edema +1 chronic    SOB (shortness of breath) on exertion       Past Surgical History:   Procedure Laterality Date    BACK SURGERY      COLONOSCOPY      ENDOSCOPY, COLON, DIAGNOSTIC      JOINT REPLACEMENT Bilateral     knees    KNEE SURGERY  2006    ME ECHO TRANSESOPHAG R-T 2D IMG ACQUISJ I&R ONLY Left 6/8/2018    TRANSESOPHAGEAL ECHOCARDIOGRAM performed by Christopher Sneed MD at [address] [place] Right     VASCULAR SURGERY      left carotid       Allergies   Allergen Reactions    Codeine Nausea And Vomiting     Patient states it can make her vomit.       Vicodin [Hydrocodone-Acetaminophen] Nausea And Vomiting        Family History   Problem Relation Age of Onset    Stroke Mother     Heart Disease Father differently: Take 50,000 Units by mouth once a week Wednesdays) 10 capsule 0    amLODIPine (NORVASC) 5 MG tablet Take 5 mg by mouth daily.  pravastatin (PRAVACHOL) 80 MG tablet Take 80 mg by mouth daily.  atenolol (TENORMIN) 100 MG tablet Take 100 mg by mouth. 1/2 tab daily       docusate sodium (COLACE, DULCOLAX) 100 MG CAPS Take 100 mg by mouth 2 times daily as needed for Constipation (Patient not taking: Reported on 9/30/2019) 60 capsule 0     No current facility-administered medications for this visit. Review of Systems -     General ROS: negative  Psychological ROS: negative  Hematological and Lymphatic ROS: No history of blood clots or bleeding disorder. Respiratory ROS: no cough, shortness of breath, or wheezing  Cardiovascular ROS: no chest pain or dyspnea on exertion  Gastrointestinal ROS: negative  Genito-Urinary ROS: no dysuria, trouble voiding, or hematuria  Musculoskeletal ROS: negative  Neurological ROS: no TIA or stroke symptoms  Dermatological ROS: negative      Blood pressure (!) 142/90, height 5' 8\" (1.727 m), weight (!) 356 lb (161.5 kg).         Physical Examination:    General appearance - alert, well appearing, and in no distress  Mental status - alert, oriented to person, place, and time  Neck - supple, no significant adenopathy, no JVD, or carotid bruits  Chest - clear to auscultation, no wheezes, rales or rhonchi, symmetric air entry  Heart - normal rate, regular rhythm, normal S1, S2, no murmurs, rubs, clicks or gallops  Abdomen - soft, nontender, nondistended, no masses or organomegaly  Neurological - alert, oriented, normal speech, no focal findings or movement disorder noted  Musculoskeletal - no joint tenderness, deformity or swelling  Extremities - peripheral pulses normal, no pedal edema, no clubbing or cyanosis  Skin - normal coloration and turgor, no rashes, no suspicious skin lesions noted    Lab  No results for input(s): CKTOTAL, CKMB, CKMBINDEX, TROPONINI in

## 2019-10-04 ENCOUNTER — PROCEDURE VISIT (OUTPATIENT)
Dept: CARDIOLOGY CLINIC | Age: 64
End: 2019-10-04
Payer: COMMERCIAL

## 2019-10-04 DIAGNOSIS — I63.9 CRYPTOGENIC STROKE (HCC): Primary | ICD-10-CM

## 2019-10-04 PROCEDURE — 93299 PR REM INTERROG ICPMS/SCRMS <30 D TECH REVIEW: CPT | Performed by: INTERNAL MEDICINE

## 2019-10-04 PROCEDURE — 93298 REM INTERROG DEV EVAL SCRMS: CPT | Performed by: INTERNAL MEDICINE

## 2019-11-01 ENCOUNTER — TELEPHONE (OUTPATIENT)
Dept: CARDIOLOGY CLINIC | Age: 64
End: 2019-11-01

## 2019-11-04 ENCOUNTER — PREP FOR PROCEDURE (OUTPATIENT)
Dept: CARDIOLOGY | Age: 64
End: 2019-11-04

## 2019-11-04 RX ORDER — SODIUM CHLORIDE 0.9 % (FLUSH) 0.9 %
10 SYRINGE (ML) INJECTION PRN
Status: CANCELLED | OUTPATIENT
Start: 2019-11-04

## 2019-11-04 RX ORDER — DIPHENHYDRAMINE HYDROCHLORIDE 50 MG/ML
50 INJECTION INTRAMUSCULAR; INTRAVENOUS ONCE
Status: CANCELLED | OUTPATIENT
Start: 2019-11-04 | End: 2019-11-04

## 2019-11-04 RX ORDER — ASPIRIN 325 MG
325 TABLET ORAL ONCE
Status: CANCELLED | OUTPATIENT
Start: 2019-11-04 | End: 2019-11-04

## 2019-11-04 RX ORDER — SODIUM CHLORIDE 0.9 % (FLUSH) 0.9 %
10 SYRINGE (ML) INJECTION EVERY 12 HOURS SCHEDULED
Status: CANCELLED | OUTPATIENT
Start: 2019-11-04

## 2019-11-04 RX ORDER — SODIUM CHLORIDE 9 MG/ML
INJECTION, SOLUTION INTRAVENOUS CONTINUOUS
Status: CANCELLED | OUTPATIENT
Start: 2019-11-04

## 2019-11-04 RX ORDER — NITROGLYCERIN 0.4 MG/1
0.4 TABLET SUBLINGUAL EVERY 5 MIN PRN
Status: CANCELLED | OUTPATIENT
Start: 2019-11-04

## 2019-11-05 ENCOUNTER — HOSPITAL ENCOUNTER (OUTPATIENT)
Dept: INTERVENTIONAL RADIOLOGY/VASCULAR | Age: 64
Discharge: HOME | End: 2019-11-05
Attending: INTERNAL MEDICINE | Admitting: INTERNAL MEDICINE
Payer: COMMERCIAL

## 2019-11-05 VITALS
DIASTOLIC BLOOD PRESSURE: 62 MMHG | HEIGHT: 68 IN | SYSTOLIC BLOOD PRESSURE: 102 MMHG | HEART RATE: 68 BPM | TEMPERATURE: 97.8 F | RESPIRATION RATE: 21 BRPM | OXYGEN SATURATION: 93 % | BODY MASS INDEX: 44.41 KG/M2 | WEIGHT: 293 LBS

## 2019-11-05 DIAGNOSIS — R60.0 BILATERAL LEG EDEMA: ICD-10-CM

## 2019-11-05 DIAGNOSIS — R06.02 SOB (SHORTNESS OF BREATH) ON EXERTION: ICD-10-CM

## 2019-11-05 DIAGNOSIS — I10 ESSENTIAL HYPERTENSION: ICD-10-CM

## 2019-11-05 LAB
ABO: NORMAL
ACTIVATED CLOTTING TIME: 252 SECONDS (ref 1–150)
ANION GAP SERPL CALCULATED.3IONS-SCNC: 12 MEQ/L (ref 8–16)
ANTIBODY SCREEN: NORMAL
APTT: 29 SECONDS (ref 22–38)
BUN BLDV-MCNC: 14 MG/DL (ref 7–22)
CALCIUM SERPL-MCNC: 9.3 MG/DL (ref 8.5–10.5)
CHLORIDE BLD-SCNC: 100 MEQ/L (ref 98–111)
CO2: 25 MEQ/L (ref 23–33)
CREAT SERPL-MCNC: 0.6 MG/DL (ref 0.4–1.2)
ERYTHROCYTE [DISTWIDTH] IN BLOOD BY AUTOMATED COUNT: 15.5 % (ref 11.5–14.5)
ERYTHROCYTE [DISTWIDTH] IN BLOOD BY AUTOMATED COUNT: 48.2 FL (ref 35–45)
GFR SERPL CREATININE-BSD FRML MDRD: > 90 ML/MIN/1.73M2
GLUCOSE BLD-MCNC: 141 MG/DL (ref 70–108)
HCT VFR BLD CALC: 47.3 % (ref 37–47)
HEMOGLOBIN: 14.8 GM/DL (ref 12–16)
INR BLD: 1.1 (ref 0.85–1.13)
MCH RBC QN AUTO: 27.1 PG (ref 26–33)
MCHC RBC AUTO-ENTMCNC: 31.3 GM/DL (ref 32.2–35.5)
MCV RBC AUTO: 86.5 FL (ref 81–99)
PLATELET # BLD: 241 THOU/MM3 (ref 130–400)
PMV BLD AUTO: 10.3 FL (ref 9.4–12.4)
POTASSIUM REFLEX MAGNESIUM: 4.1 MEQ/L (ref 3.5–5.2)
RBC # BLD: 5.47 MILL/MM3 (ref 4.2–5.4)
RH FACTOR: NORMAL
SODIUM BLD-SCNC: 137 MEQ/L (ref 135–145)
WBC # BLD: 7.6 THOU/MM3 (ref 4.8–10.8)

## 2019-11-05 PROCEDURE — 37224 PR REVSC OPN/PRG FEM/POP W/ANGIOPLASTY UNI: CPT | Performed by: INTERNAL MEDICINE

## 2019-11-05 PROCEDURE — 2580000003 HC RX 258: Performed by: NURSE PRACTITIONER

## 2019-11-05 PROCEDURE — 85730 THROMBOPLASTIN TIME PARTIAL: CPT

## 2019-11-05 PROCEDURE — 2709999900 HC NON-CHARGEABLE SUPPLY

## 2019-11-05 PROCEDURE — 6370000000 HC RX 637 (ALT 250 FOR IP)

## 2019-11-05 PROCEDURE — 86901 BLOOD TYPING SEROLOGIC RH(D): CPT

## 2019-11-05 PROCEDURE — 75716 ARTERY X-RAYS ARMS/LEGS: CPT | Performed by: INTERNAL MEDICINE

## 2019-11-05 PROCEDURE — 36246 INS CATH ABD/L-EXT ART 2ND: CPT | Performed by: INTERNAL MEDICINE

## 2019-11-05 PROCEDURE — C1769 GUIDE WIRE: HCPCS

## 2019-11-05 PROCEDURE — 6360000002 HC RX W HCPCS

## 2019-11-05 PROCEDURE — 75630 X-RAY AORTA LEG ARTERIES: CPT | Performed by: INTERNAL MEDICINE

## 2019-11-05 PROCEDURE — 6360000004 HC RX CONTRAST MEDICATION: Performed by: INTERNAL MEDICINE

## 2019-11-05 PROCEDURE — C1887 CATHETER, GUIDING: HCPCS

## 2019-11-05 PROCEDURE — C1894 INTRO/SHEATH, NON-LASER: HCPCS

## 2019-11-05 PROCEDURE — 85027 COMPLETE CBC AUTOMATED: CPT

## 2019-11-05 PROCEDURE — 6360000002 HC RX W HCPCS: Performed by: INTERNAL MEDICINE

## 2019-11-05 PROCEDURE — 86900 BLOOD TYPING SEROLOGIC ABO: CPT

## 2019-11-05 PROCEDURE — 2500000003 HC RX 250 WO HCPCS: Performed by: INTERNAL MEDICINE

## 2019-11-05 PROCEDURE — 2500000003 HC RX 250 WO HCPCS

## 2019-11-05 PROCEDURE — 36415 COLL VENOUS BLD VENIPUNCTURE: CPT

## 2019-11-05 PROCEDURE — 75625 CONTRAST EXAM ABDOMINL AORTA: CPT | Performed by: INTERNAL MEDICINE

## 2019-11-05 PROCEDURE — 6370000000 HC RX 637 (ALT 250 FOR IP): Performed by: INTERNAL MEDICINE

## 2019-11-05 PROCEDURE — 85347 COAGULATION TIME ACTIVATED: CPT

## 2019-11-05 PROCEDURE — 37224 HC PLASTY UNI FEMPOP: CPT | Performed by: INTERNAL MEDICINE

## 2019-11-05 PROCEDURE — 86850 RBC ANTIBODY SCREEN: CPT

## 2019-11-05 PROCEDURE — 94761 N-INVAS EAR/PLS OXIMETRY MLT: CPT

## 2019-11-05 PROCEDURE — 80048 BASIC METABOLIC PNL TOTAL CA: CPT

## 2019-11-05 PROCEDURE — 85610 PROTHROMBIN TIME: CPT

## 2019-11-05 PROCEDURE — C1725 CATH, TRANSLUMIN NON-LASER: HCPCS

## 2019-11-05 RX ORDER — ACETAMINOPHEN 325 MG/1
650 TABLET ORAL EVERY 4 HOURS PRN
Status: DISCONTINUED | OUTPATIENT
Start: 2019-11-05 | End: 2019-11-05 | Stop reason: HOSPADM

## 2019-11-05 RX ORDER — SODIUM CHLORIDE 0.9 % (FLUSH) 0.9 %
10 SYRINGE (ML) INJECTION EVERY 12 HOURS SCHEDULED
Status: DISCONTINUED | OUTPATIENT
Start: 2019-11-05 | End: 2019-11-05 | Stop reason: HOSPADM

## 2019-11-05 RX ORDER — DIPHENHYDRAMINE HYDROCHLORIDE 50 MG/ML
50 INJECTION INTRAMUSCULAR; INTRAVENOUS ONCE
Status: DISCONTINUED | OUTPATIENT
Start: 2019-11-05 | End: 2019-11-05 | Stop reason: HOSPADM

## 2019-11-05 RX ORDER — ASPIRIN 325 MG
325 TABLET ORAL ONCE
Status: DISCONTINUED | OUTPATIENT
Start: 2019-11-05 | End: 2019-11-05 | Stop reason: HOSPADM

## 2019-11-05 RX ORDER — CLOPIDOGREL BISULFATE 75 MG/1
75 TABLET ORAL DAILY
Status: DISCONTINUED | OUTPATIENT
Start: 2019-11-06 | End: 2019-11-05 | Stop reason: HOSPADM

## 2019-11-05 RX ORDER — SODIUM CHLORIDE 9 MG/ML
INJECTION, SOLUTION INTRAVENOUS CONTINUOUS
Status: DISCONTINUED | OUTPATIENT
Start: 2019-11-05 | End: 2019-11-05 | Stop reason: HOSPADM

## 2019-11-05 RX ORDER — SODIUM CHLORIDE 0.9 % (FLUSH) 0.9 %
10 SYRINGE (ML) INJECTION EVERY 12 HOURS SCHEDULED
Status: DISCONTINUED | OUTPATIENT
Start: 2019-11-05 | End: 2019-11-05 | Stop reason: SDUPTHER

## 2019-11-05 RX ORDER — POTASSIUM CITRATE 10 MEQ/1
20 TABLET, EXTENDED RELEASE ORAL 2 TIMES DAILY
COMMUNITY
End: 2020-12-12

## 2019-11-05 RX ORDER — CLOPIDOGREL 300 MG/1
600 TABLET, FILM COATED ORAL ONCE
Status: COMPLETED | OUTPATIENT
Start: 2019-11-05 | End: 2019-11-05

## 2019-11-05 RX ORDER — SODIUM CHLORIDE 0.9 % (FLUSH) 0.9 %
10 SYRINGE (ML) INJECTION PRN
Status: DISCONTINUED | OUTPATIENT
Start: 2019-11-05 | End: 2019-11-05 | Stop reason: HOSPADM

## 2019-11-05 RX ORDER — FENTANYL CITRATE 50 UG/ML
50 INJECTION, SOLUTION INTRAMUSCULAR; INTRAVENOUS ONCE
Status: COMPLETED | OUTPATIENT
Start: 2019-11-05 | End: 2019-11-05

## 2019-11-05 RX ORDER — MIDAZOLAM HYDROCHLORIDE 1 MG/ML
1 INJECTION INTRAMUSCULAR; INTRAVENOUS ONCE
Status: COMPLETED | OUTPATIENT
Start: 2019-11-05 | End: 2019-11-05

## 2019-11-05 RX ORDER — CEFAZOLIN SODIUM 1 G/50ML
1 INJECTION, SOLUTION INTRAVENOUS ONCE
Status: COMPLETED | OUTPATIENT
Start: 2019-11-05 | End: 2019-11-05

## 2019-11-05 RX ORDER — SODIUM CHLORIDE 0.9 % (FLUSH) 0.9 %
10 SYRINGE (ML) INJECTION PRN
Status: DISCONTINUED | OUTPATIENT
Start: 2019-11-05 | End: 2019-11-05 | Stop reason: SDUPTHER

## 2019-11-05 RX ORDER — HEPARIN SODIUM 1000 [USP'U]/ML
8000 INJECTION, SOLUTION INTRAVENOUS; SUBCUTANEOUS ONCE
Status: COMPLETED | OUTPATIENT
Start: 2019-11-05 | End: 2019-11-05

## 2019-11-05 RX ORDER — PRAVASTATIN SODIUM 80 MG/1
80 TABLET ORAL DAILY
COMMUNITY
End: 2021-03-09 | Stop reason: ALTCHOICE

## 2019-11-05 RX ORDER — HEPARIN SODIUM 1000 [USP'U]/ML
40 INJECTION, SOLUTION INTRAVENOUS; SUBCUTANEOUS ONCE
Status: COMPLETED | OUTPATIENT
Start: 2019-11-05 | End: 2019-11-05

## 2019-11-05 RX ORDER — ASPIRIN 81 MG/1
81 TABLET, CHEWABLE ORAL DAILY
Status: DISCONTINUED | OUTPATIENT
Start: 2019-11-06 | End: 2019-11-05 | Stop reason: HOSPADM

## 2019-11-05 RX ADMIN — CLOPIDOGREL BISULFATE 600 MG: 300 TABLET, FILM COATED ORAL at 08:35

## 2019-11-05 RX ADMIN — Medication 200 MCG: at 07:30

## 2019-11-05 RX ADMIN — SODIUM CHLORIDE: 9 INJECTION, SOLUTION INTRAVENOUS at 06:07

## 2019-11-05 RX ADMIN — IOPAMIDOL 250 ML: 612 INJECTION, SOLUTION INTRAVENOUS at 08:25

## 2019-11-05 RX ADMIN — FENTANYL CITRATE 50 MCG: 50 INJECTION INTRAMUSCULAR; INTRAVENOUS at 07:27

## 2019-11-05 RX ADMIN — HEPARIN SODIUM 8000 UNITS: 1000 INJECTION INTRAVENOUS; SUBCUTANEOUS at 08:11

## 2019-11-05 RX ADMIN — FENTANYL CITRATE 50 MCG: 50 INJECTION INTRAMUSCULAR; INTRAVENOUS at 08:13

## 2019-11-05 RX ADMIN — MIDAZOLAM 1 MG: 1 INJECTION INTRAMUSCULAR; INTRAVENOUS at 08:13

## 2019-11-05 RX ADMIN — HEPARIN SODIUM 5900 UNITS: 1000 INJECTION INTRAVENOUS; SUBCUTANEOUS at 07:30

## 2019-11-05 RX ADMIN — CEFAZOLIN SODIUM 1 G: 1 INJECTION, SOLUTION INTRAVENOUS at 08:36

## 2019-11-05 RX ADMIN — MIDAZOLAM 1 MG: 1 INJECTION INTRAMUSCULAR; INTRAVENOUS at 07:27

## 2019-11-05 ASSESSMENT — PAIN SCALES - GENERAL
PAINLEVEL_OUTOF10: 0
PAINLEVEL_OUTOF10: 0

## 2019-11-08 ENCOUNTER — PROCEDURE VISIT (OUTPATIENT)
Dept: CARDIOLOGY CLINIC | Age: 64
End: 2019-11-08
Payer: COMMERCIAL

## 2019-11-08 DIAGNOSIS — I63.9 CRYPTOGENIC STROKE (HCC): Primary | ICD-10-CM

## 2019-11-08 PROCEDURE — 93299 PR REM INTERROG ICPMS/SCRMS <30 D TECH REVIEW: CPT | Performed by: INTERNAL MEDICINE

## 2019-11-08 PROCEDURE — 93298 REM INTERROG DEV EVAL SCRMS: CPT | Performed by: INTERNAL MEDICINE

## 2019-12-06 ENCOUNTER — OFFICE VISIT (OUTPATIENT)
Dept: CARDIOLOGY CLINIC | Age: 64
End: 2019-12-06
Payer: COMMERCIAL

## 2019-12-06 ENCOUNTER — TELEPHONE (OUTPATIENT)
Dept: CARDIOLOGY CLINIC | Age: 64
End: 2019-12-06

## 2019-12-06 VITALS
HEART RATE: 77 BPM | DIASTOLIC BLOOD PRESSURE: 82 MMHG | HEIGHT: 68 IN | BODY MASS INDEX: 44.41 KG/M2 | SYSTOLIC BLOOD PRESSURE: 139 MMHG | WEIGHT: 293 LBS

## 2019-12-06 DIAGNOSIS — E78.00 PURE HYPERCHOLESTEROLEMIA: ICD-10-CM

## 2019-12-06 DIAGNOSIS — E66.01 MORBID OBESITY DUE TO EXCESS CALORIES (HCC): ICD-10-CM

## 2019-12-06 DIAGNOSIS — Z98.62 S/P ANGIOPLASTY: ICD-10-CM

## 2019-12-06 DIAGNOSIS — R06.02 SOB (SHORTNESS OF BREATH) ON EXERTION: ICD-10-CM

## 2019-12-06 DIAGNOSIS — I73.9 PAD (PERIPHERAL ARTERY DISEASE) (HCC): Primary | ICD-10-CM

## 2019-12-06 DIAGNOSIS — I10 ESSENTIAL HYPERTENSION: ICD-10-CM

## 2019-12-06 DIAGNOSIS — R60.0 BILATERAL LEG EDEMA: ICD-10-CM

## 2019-12-06 PROCEDURE — 3017F COLORECTAL CA SCREEN DOC REV: CPT | Performed by: INTERNAL MEDICINE

## 2019-12-06 PROCEDURE — 1036F TOBACCO NON-USER: CPT | Performed by: INTERNAL MEDICINE

## 2019-12-06 PROCEDURE — 4040F PNEUMOC VAC/ADMIN/RCVD: CPT | Performed by: INTERNAL MEDICINE

## 2019-12-06 PROCEDURE — 99214 OFFICE O/P EST MOD 30 MIN: CPT | Performed by: INTERNAL MEDICINE

## 2019-12-06 PROCEDURE — G8427 DOCREV CUR MEDS BY ELIG CLIN: HCPCS | Performed by: INTERNAL MEDICINE

## 2019-12-06 PROCEDURE — 1123F ACP DISCUSS/DSCN MKR DOCD: CPT | Performed by: INTERNAL MEDICINE

## 2019-12-06 PROCEDURE — 1090F PRES/ABSN URINE INCON ASSESS: CPT | Performed by: INTERNAL MEDICINE

## 2019-12-06 PROCEDURE — G8598 ASA/ANTIPLAT THER USED: HCPCS | Performed by: INTERNAL MEDICINE

## 2019-12-06 PROCEDURE — G8400 PT W/DXA NO RESULTS DOC: HCPCS | Performed by: INTERNAL MEDICINE

## 2019-12-06 PROCEDURE — G8417 CALC BMI ABV UP PARAM F/U: HCPCS | Performed by: INTERNAL MEDICINE

## 2019-12-06 PROCEDURE — 93000 ELECTROCARDIOGRAM COMPLETE: CPT | Performed by: INTERNAL MEDICINE

## 2019-12-06 PROCEDURE — G8484 FLU IMMUNIZE NO ADMIN: HCPCS | Performed by: INTERNAL MEDICINE

## 2019-12-06 RX ORDER — CLOPIDOGREL BISULFATE 75 MG/1
75 TABLET ORAL DAILY
Qty: 30 TABLET | Refills: 7 | Status: SHIPPED | OUTPATIENT
Start: 2019-12-06 | End: 2022-08-24

## 2020-02-03 ENCOUNTER — APPOINTMENT (OUTPATIENT)
Dept: GENERAL RADIOLOGY | Age: 65
End: 2020-02-03
Payer: COMMERCIAL

## 2020-02-03 ENCOUNTER — HOSPITAL ENCOUNTER (EMERGENCY)
Age: 65
Discharge: HOME | End: 2020-02-03
Attending: EMERGENCY MEDICINE
Payer: COMMERCIAL

## 2020-02-03 VITALS
BODY MASS INDEX: 44.41 KG/M2 | HEIGHT: 68 IN | DIASTOLIC BLOOD PRESSURE: 86 MMHG | TEMPERATURE: 98.3 F | RESPIRATION RATE: 16 BRPM | WEIGHT: 293 LBS | HEART RATE: 84 BPM | OXYGEN SATURATION: 96 % | SYSTOLIC BLOOD PRESSURE: 149 MMHG

## 2020-02-03 PROCEDURE — 6370000000 HC RX 637 (ALT 250 FOR IP): Performed by: PHYSICIAN ASSISTANT

## 2020-02-03 PROCEDURE — 90471 IMMUNIZATION ADMIN: CPT | Performed by: PHYSICIAN ASSISTANT

## 2020-02-03 PROCEDURE — 73564 X-RAY EXAM KNEE 4 OR MORE: CPT

## 2020-02-03 PROCEDURE — 73060 X-RAY EXAM OF HUMERUS: CPT

## 2020-02-03 PROCEDURE — 90715 TDAP VACCINE 7 YRS/> IM: CPT | Performed by: PHYSICIAN ASSISTANT

## 2020-02-03 PROCEDURE — 96372 THER/PROPH/DIAG INJ SC/IM: CPT

## 2020-02-03 PROCEDURE — 99284 EMERGENCY DEPT VISIT MOD MDM: CPT

## 2020-02-03 PROCEDURE — 6360000002 HC RX W HCPCS: Performed by: PHYSICIAN ASSISTANT

## 2020-02-03 RX ORDER — CYCLOBENZAPRINE HCL 10 MG
10 TABLET ORAL 3 TIMES DAILY PRN
Qty: 12 TABLET | Refills: 0 | Status: SHIPPED | OUTPATIENT
Start: 2020-02-03 | End: 2020-12-12 | Stop reason: ALTCHOICE

## 2020-02-03 RX ORDER — CYCLOBENZAPRINE HCL 10 MG
10 TABLET ORAL ONCE
Status: COMPLETED | OUTPATIENT
Start: 2020-02-03 | End: 2020-02-03

## 2020-02-03 RX ORDER — KETOROLAC TROMETHAMINE 30 MG/ML
30 INJECTION, SOLUTION INTRAMUSCULAR; INTRAVENOUS ONCE
Status: COMPLETED | OUTPATIENT
Start: 2020-02-03 | End: 2020-02-03

## 2020-02-03 RX ORDER — KETOROLAC TROMETHAMINE 10 MG/1
10 TABLET, FILM COATED ORAL EVERY 6 HOURS PRN
Qty: 15 TABLET | Refills: 0 | Status: SHIPPED | OUTPATIENT
Start: 2020-02-03 | End: 2020-12-12 | Stop reason: ALTCHOICE

## 2020-02-03 RX ADMIN — CYCLOBENZAPRINE 10 MG: 10 TABLET, FILM COATED ORAL at 17:05

## 2020-02-03 RX ADMIN — KETOROLAC TROMETHAMINE 30 MG: 30 INJECTION, SOLUTION INTRAMUSCULAR at 17:05

## 2020-02-03 RX ADMIN — TETANUS TOXOID, REDUCED DIPHTHERIA TOXOID AND ACELLULAR PERTUSSIS VACCINE, ADSORBED 0.5 ML: 5; 2.5; 8; 8; 2.5 SUSPENSION INTRAMUSCULAR at 17:06

## 2020-02-03 ASSESSMENT — ENCOUNTER SYMPTOMS
BACK PAIN: 0
VOMITING: 0
SHORTNESS OF BREATH: 0
NAUSEA: 0
PHOTOPHOBIA: 0
RHINORRHEA: 0

## 2020-02-03 ASSESSMENT — PAIN DESCRIPTION - ORIENTATION: ORIENTATION: LEFT

## 2020-02-03 ASSESSMENT — PAIN SCALES - GENERAL: PAINLEVEL_OUTOF10: 10

## 2020-02-03 ASSESSMENT — PAIN DESCRIPTION - PAIN TYPE: TYPE: ACUTE PAIN

## 2020-02-03 ASSESSMENT — VISUAL ACUITY: OU: 1

## 2020-02-03 ASSESSMENT — PAIN DESCRIPTION - LOCATION: LOCATION: SHOULDER;EYE

## 2020-02-03 NOTE — ED PROVIDER NOTES
spinal stenosis, GERD (gastroesophageal reflux disease), Gout, History of MI (myocardial infarction), History of stroke, Hyperlipidemia, Hypertension, Morbid obesity with body mass index of 50.0-59.9 in adult [facility]), Spondylolisthesis of lumbar region, and Unspecified cerebral artery occlusion with cerebral infarction. SURGICAL HISTORY      has a past surgical history that includes knee surgery (); vascular surgery; Colonoscopy; Endoscopy, colon, diagnostic; joint replacement (Bilateral); back surgery; pr echo transesophag r-t 2d img acquisj i&r only (Left, 2018); and Revision Shoulder Arthroplasty (Right, 2019). CURRENT MEDICATIONS       Discharge Medication List as of 2/3/2020  6:15 PM      CONTINUE these medications which have NOT CHANGED    Details   Cyanocobalamin (VITAMIN B 12 PO) Take by mouthHistorical Med      clopidogrel (PLAVIX) 75 MG tablet Take 1 tablet by mouth daily, Disp-30 tablet, R-7Normal      polyethylene glycol (GLYCOLAX) powder Dispense 238 Gram Bottle. Use as Directed, Disp-238 g, R-0Normal      pravastatin (PRAVACHOL) 80 MG tablet Take 80 mg by mouth dailyHistorical Med      potassium citrate (UROCIT-K) 10 MEQ (1080 MG) extended release tablet Take 20 mEq by mouth 2 times dailyHistorical Med      furosemide (LASIX) 20 MG tablet Take 20 mg by mouth daily as needed Historical Med      aspirin 81 MG chewable tablet Take 1 tablet by mouth daily, Disp-30 tablet, R-3OTC      Ascorbic Acid (VITAMIN C) 250 MG tablet Take 250 mg by mouth dailyHistorical Med      amLODIPine (NORVASC) 5 MG tablet Take 5 mg by mouth daily. Historical Med      atenolol (TENORMIN) 100 MG tablet Take 100 mg by mouth. 1/2 tab daily Historical Med             ALLERGIES     is allergic to codeine and vicodin [hydrocodone-acetaminophen]. FAMILY HISTORY     She indicated that her mother is . She indicated that her father is . She indicated that both of her sisters are .  She indicated wheezing. Abdominal:      Palpations: Abdomen is soft. Abdomen is not rigid. Tenderness: There is no abdominal tenderness. There is no guarding. Musculoskeletal:      Left shoulder: She exhibits decreased range of motion (Mildly decreased due to pain in left upper extremity). She exhibits no tenderness, no bony tenderness, no swelling and no deformity. Left elbow: She exhibits normal range of motion and no swelling. No tenderness found. Left hip: Normal.      Left knee: She exhibits decreased range of motion (Secondary to pain). She exhibits no swelling, no effusion and no laceration. No tenderness found. Cervical back: Normal.      Thoracic back: Normal.      Lumbar back: Normal.      Left upper arm: She exhibits tenderness. She exhibits no bony tenderness and no swelling. Comments: Good strength appreciated in all muscle groups. Lymphadenopathy:      Cervical: No cervical adenopathy. Skin:     General: Skin is warm and dry. Coloration: Skin is not pale. Findings: No bruising, ecchymosis or rash. Neurological:      Mental Status: She is alert and oriented to person, place, and time. GCS: GCS eye subscore is 4. GCS verbal subscore is 5. GCS motor subscore is 6. Cranial Nerves: No cranial nerve deficit. Sensory: No sensory deficit. Gait: Gait normal.      Comments: Cranial nerves II-XII intact   Psychiatric:         Speech: Speech normal.         Behavior: Behavior normal. Behavior is cooperative. Thought Content:  Thought content normal.         DIFFERENTIAL DIAGNOSIS:   Including but not limited to: Arm contusion, humeral neck fracture, knee sprain, neck contusion versus strain, minor head injury, low suspicion for fracture, ICH    DIAGNOSTIC RESULTS     EKG: All EKG's are interpreted by theformerly [facility] Physician who either signs or Co-signs this chart in the absence of a cardiologist.  None    RADIOLOGY: non-plain film images(s) such

## 2020-02-03 NOTE — ED TRIAGE NOTES
Pt was walking in her home when she tripped over her carpet and fell onto her left side. She states that she trips often on this carpet and \"it needs replaced\". Pt complaining of left shoulder/upper arm pain and left eye pain. Small contusion noted to pt left eye and small abrasion noted to pt left elbow. Pt states she is on Plavix. Pt alert and oriented x4. GCS 15. Pt hooked up to monitor, call light within reach.

## 2020-02-11 ENCOUNTER — HOSPITAL ENCOUNTER (OUTPATIENT)
Age: 65
Setting detail: SPECIMEN
Discharge: HOME | End: 2020-02-11
Payer: COMMERCIAL

## 2020-02-11 LAB
ABSOLUTE EOS #: 0.24 K/UL (ref 0–0.44)
ABSOLUTE IMMATURE GRANULOCYTE: 0.03 K/UL (ref 0–0.3)
ABSOLUTE LYMPH #: 1.44 K/UL (ref 1.1–3.7)
ABSOLUTE MONO #: 0.61 K/UL (ref 0.1–1.2)
ALBUMIN SERPL-MCNC: 3.8 G/DL (ref 3.5–5.2)
ALBUMIN/GLOBULIN RATIO: 1 (ref 1–2.5)
ALP BLD-CCNC: 74 U/L (ref 35–104)
ALT SERPL-CCNC: 25 U/L (ref 5–33)
ANION GAP SERPL CALCULATED.3IONS-SCNC: 20 MMOL/L (ref 9–17)
AST SERPL-CCNC: 24 U/L
BASOPHILS # BLD: 1 % (ref 0–2)
BASOPHILS ABSOLUTE: 0.07 K/UL (ref 0–0.2)
BILIRUB SERPL-MCNC: 0.54 MG/DL (ref 0.3–1.2)
BUN BLDV-MCNC: 10 MG/DL (ref 8–23)
BUN/CREAT BLD: ABNORMAL (ref 9–20)
CALCIUM SERPL-MCNC: 9.1 MG/DL (ref 8.6–10.4)
CHLORIDE BLD-SCNC: 96 MMOL/L (ref 98–107)
CHOLESTEROL/HDL RATIO: 3.9
CHOLESTEROL: 211 MG/DL
CO2: 24 MMOL/L (ref 20–31)
CREAT SERPL-MCNC: 0.67 MG/DL (ref 0.5–0.9)
DIFFERENTIAL TYPE: ABNORMAL
EOSINOPHILS RELATIVE PERCENT: 4 % (ref 1–4)
ESTIMATED AVERAGE GLUCOSE: 260 MG/DL
GFR AFRICAN AMERICAN: >60 ML/MIN
GFR NON-AFRICAN AMERICAN: >60 ML/MIN
GFR SERPL CREATININE-BSD FRML MDRD: ABNORMAL ML/MIN/{1.73_M2}
GFR SERPL CREATININE-BSD FRML MDRD: ABNORMAL ML/MIN/{1.73_M2}
GLUCOSE BLD-MCNC: 242 MG/DL (ref 70–99)
HBA1C MFR BLD: 10.7 % (ref 4–6)
HCT VFR BLD CALC: 51.9 % (ref 36.3–47.1)
HDLC SERPL-MCNC: 54 MG/DL
HEMOGLOBIN: 15.6 G/DL (ref 11.9–15.1)
IMMATURE GRANULOCYTES: 1 %
LDL CHOLESTEROL: 139 MG/DL (ref 0–130)
LYMPHOCYTES # BLD: 24 % (ref 24–43)
MCH RBC QN AUTO: 26.7 PG (ref 25.2–33.5)
MCHC RBC AUTO-ENTMCNC: 30.1 G/DL (ref 28.4–34.8)
MCV RBC AUTO: 88.9 FL (ref 82.6–102.9)
MONOCYTES # BLD: 10 % (ref 3–12)
NRBC AUTOMATED: 0 PER 100 WBC
PDW BLD-RTO: 14.5 % (ref 11.8–14.4)
PLATELET # BLD: 241 K/UL (ref 138–453)
PLATELET ESTIMATE: ABNORMAL
PMV BLD AUTO: 11 FL (ref 8.1–13.5)
POTASSIUM SERPL-SCNC: 3.8 MMOL/L (ref 3.7–5.3)
RBC # BLD: 5.84 M/UL (ref 3.95–5.11)
RBC # BLD: ABNORMAL 10*6/UL
SEG NEUTROPHILS: 60 % (ref 36–65)
SEGMENTED NEUTROPHILS ABSOLUTE COUNT: 3.69 K/UL (ref 1.5–8.1)
SODIUM BLD-SCNC: 140 MMOL/L (ref 135–144)
THYROXINE, FREE: 1.2 NG/DL (ref 0.93–1.7)
TOTAL PROTEIN: 7.5 G/DL (ref 6.4–8.3)
TRIGL SERPL-MCNC: 90 MG/DL
TSH SERPL DL<=0.05 MIU/L-ACNC: 2.16 MIU/L (ref 0.3–5)
VLDLC SERPL CALC-MCNC: ABNORMAL MG/DL (ref 1–30)
WBC # BLD: 6.1 K/UL (ref 3.5–11.3)
WBC # BLD: ABNORMAL 10*3/UL

## 2020-05-08 ENCOUNTER — TELEPHONE (OUTPATIENT)
Dept: CARDIOLOGY CLINIC | Age: 65
End: 2020-05-08

## 2020-05-14 ENCOUNTER — PROCEDURE VISIT (OUTPATIENT)
Dept: CARDIOLOGY CLINIC | Age: 65
End: 2020-05-14
Payer: COMMERCIAL

## 2020-05-14 PROCEDURE — G2066 INTER DEVC REMOTE 30D: HCPCS | Performed by: INTERNAL MEDICINE

## 2020-05-14 PROCEDURE — 93298 REM INTERROG DEV EVAL SCRMS: CPT | Performed by: INTERNAL MEDICINE

## 2020-06-15 ENCOUNTER — PROCEDURE VISIT (OUTPATIENT)
Dept: CARDIOLOGY CLINIC | Age: 65
End: 2020-06-15
Payer: COMMERCIAL

## 2020-06-15 PROCEDURE — 93298 REM INTERROG DEV EVAL SCRMS: CPT | Performed by: INTERNAL MEDICINE

## 2020-06-15 PROCEDURE — G2066 INTER DEVC REMOTE 30D: HCPCS | Performed by: INTERNAL MEDICINE

## 2020-07-20 ENCOUNTER — PROCEDURE VISIT (OUTPATIENT)
Dept: CARDIOLOGY CLINIC | Age: 65
End: 2020-07-20
Payer: COMMERCIAL

## 2020-07-20 PROCEDURE — G2066 INTER DEVC REMOTE 30D: HCPCS | Performed by: INTERNAL MEDICINE

## 2020-07-20 PROCEDURE — 93298 REM INTERROG DEV EVAL SCRMS: CPT | Performed by: INTERNAL MEDICINE

## 2020-07-20 NOTE — PROGRESS NOTES
DR MEEKS PT   REVEAL BATTERY OK    194 PAUSES MARKED  APPEAR TO BE FALSE    4 AFIB EPISODES  I DO NOT THINK THIS IS AFIB/ IT LOOKS MORE LIKE ALOT OF PAC'S AND PVC'S    PLEASE REVIEW

## 2020-07-22 NOTE — PROGRESS NOTES
Device Interrogation Reviewed.   No atr fib   I agree it is freq PAcs    No pause rather low voltage QRS and not sensed

## 2020-08-24 ENCOUNTER — PROCEDURE VISIT (OUTPATIENT)
Dept: CARDIOLOGY CLINIC | Age: 65
End: 2020-08-24
Payer: COMMERCIAL

## 2020-08-24 PROCEDURE — G2066 INTER DEVC REMOTE 30D: HCPCS | Performed by: INTERNAL MEDICINE

## 2020-08-24 PROCEDURE — 93298 REM INTERROG DEV EVAL SCRMS: CPT | Performed by: INTERNAL MEDICINE

## 2020-08-28 NOTE — PROGRESS NOTES
Device Interrogation Reviewed. Device Interrogation Reviewed.   No atr fib    freq PAcs     No pause

## 2020-09-30 ENCOUNTER — PROCEDURE VISIT (OUTPATIENT)
Dept: CARDIOLOGY CLINIC | Age: 65
End: 2020-09-30
Payer: COMMERCIAL

## 2020-09-30 PROCEDURE — G2066 INTER DEVC REMOTE 30D: HCPCS | Performed by: INTERNAL MEDICINE

## 2020-09-30 PROCEDURE — 93298 REM INTERROG DEV EVAL SCRMS: CPT | Performed by: INTERNAL MEDICINE

## 2020-11-03 PROBLEM — I10 ESSENTIAL HYPERTENSION: Status: RESOLVED | Noted: 2020-11-03 | Resolved: 2020-11-03

## 2020-11-03 PROBLEM — I73.9 PAD (PERIPHERAL ARTERY DISEASE) (HCC): Status: RESOLVED | Noted: 2019-12-06 | Resolved: 2020-11-03

## 2020-11-04 ENCOUNTER — HOSPITAL ENCOUNTER (OUTPATIENT)
Age: 65
Setting detail: SPECIMEN
Discharge: HOME | End: 2020-11-04
Payer: COMMERCIAL

## 2020-11-04 LAB — URIC ACID: 2.3 MG/DL (ref 2.4–5.7)

## 2020-11-05 ENCOUNTER — TELEPHONE (OUTPATIENT)
Dept: CARDIOLOGY CLINIC | Age: 65
End: 2020-11-05

## 2020-11-05 ENCOUNTER — PROCEDURE VISIT (OUTPATIENT)
Dept: CARDIOLOGY CLINIC | Age: 65
End: 2020-11-05
Payer: COMMERCIAL

## 2020-11-05 LAB
ESTIMATED AVERAGE GLUCOSE: 395 MG/DL
HBA1C MFR BLD: 15.4 % (ref 4–6)

## 2020-11-05 PROCEDURE — 93298 REM INTERROG DEV EVAL SCRMS: CPT | Performed by: INTERNAL MEDICINE

## 2020-11-05 PROCEDURE — G2066 INTER DEVC REMOTE 30D: HCPCS | Performed by: INTERNAL MEDICINE

## 2020-12-08 ENCOUNTER — TELEPHONE (OUTPATIENT)
Dept: CARDIOLOGY CLINIC | Age: 65
End: 2020-12-08

## 2020-12-12 ENCOUNTER — APPOINTMENT (OUTPATIENT)
Dept: GENERAL RADIOLOGY | Age: 65
DRG: 177 | End: 2020-12-12
Payer: COMMERCIAL

## 2020-12-12 ENCOUNTER — HOSPITAL ENCOUNTER (INPATIENT)
Age: 65
LOS: 6 days | Discharge: HOME HEALTH | DRG: 177 | End: 2020-12-18
Attending: EMERGENCY MEDICINE | Admitting: INTERNAL MEDICINE
Payer: COMMERCIAL

## 2020-12-12 PROBLEM — U07.1 COVID-19: Status: ACTIVE | Noted: 2020-12-12

## 2020-12-12 LAB
ALBUMIN SERPL-MCNC: 3.1 G/DL (ref 3.5–5.1)
ALBUMIN SERPL-MCNC: 3.5 G/DL (ref 3.5–5.1)
ALP BLD-CCNC: 47 U/L (ref 38–126)
ALP BLD-CCNC: 50 U/L (ref 38–126)
ALT SERPL-CCNC: 20 U/L (ref 11–66)
ALT SERPL-CCNC: 22 U/L (ref 11–66)
ANION GAP SERPL CALCULATED.3IONS-SCNC: 12 MEQ/L (ref 8–16)
APTT: 22.1 SECONDS (ref 22–38)
AST SERPL-CCNC: 24 U/L (ref 5–40)
AST SERPL-CCNC: 26 U/L (ref 5–40)
BACTERIA: ABNORMAL /HPF
BASOPHILS # BLD: 0.4 %
BASOPHILS ABSOLUTE: 0 THOU/MM3 (ref 0–0.1)
BILIRUB SERPL-MCNC: 0.5 MG/DL (ref 0.3–1.2)
BILIRUB SERPL-MCNC: 0.5 MG/DL (ref 0.3–1.2)
BILIRUBIN DIRECT: < 0.2 MG/DL (ref 0–0.3)
BILIRUBIN DIRECT: < 0.2 MG/DL (ref 0–0.3)
BILIRUBIN URINE: NEGATIVE
BLOOD, URINE: NEGATIVE
BUN BLDV-MCNC: 9 MG/DL (ref 7–22)
C-REACTIVE PROTEIN: 7.2 MG/DL (ref 0–1)
CALCIUM SERPL-MCNC: 8.6 MG/DL (ref 8.5–10.5)
CASTS 2: ABNORMAL /LPF
CASTS UA: ABNORMAL /LPF
CHARACTER, URINE: CLEAR
CHLORIDE BLD-SCNC: 92 MEQ/L (ref 98–111)
CO2: 21 MEQ/L (ref 23–33)
COLOR: YELLOW
CREAT SERPL-MCNC: 0.7 MG/DL (ref 0.4–1.2)
CRYSTALS, UA: ABNORMAL
D-DIMER QUANTITATIVE: 488 NG/ML FEU (ref 0–500)
EKG ATRIAL RATE: 96 BPM
EKG P AXIS: 44 DEGREES
EKG P-R INTERVAL: 164 MS
EKG Q-T INTERVAL: 368 MS
EKG QRS DURATION: 96 MS
EKG QTC CALCULATION (BAZETT): 464 MS
EKG R AXIS: 60 DEGREES
EKG T AXIS: 20 DEGREES
EKG VENTRICULAR RATE: 96 BPM
EOSINOPHIL # BLD: 0.2 %
EOSINOPHILS ABSOLUTE: 0 THOU/MM3 (ref 0–0.4)
EPITHELIAL CELLS, UA: ABNORMAL /HPF
ERYTHROCYTE [DISTWIDTH] IN BLOOD BY AUTOMATED COUNT: 15.3 % (ref 11.5–14.5)
ERYTHROCYTE [DISTWIDTH] IN BLOOD BY AUTOMATED COUNT: 45 FL (ref 35–45)
FERRITIN: 2707 NG/ML (ref 10–291)
FIBRINOGEN: 517 MG/100ML (ref 155–475)
GFR SERPL CREATININE-BSD FRML MDRD: > 90 ML/MIN/1.73M2
GLUCOSE BLD-MCNC: 320 MG/DL (ref 70–108)
GLUCOSE BLD-MCNC: 327 MG/DL (ref 70–108)
GLUCOSE BLD-MCNC: 350 MG/DL (ref 70–108)
GLUCOSE BLD-MCNC: 363 MG/DL (ref 70–108)
GLUCOSE URINE: >= 1000 MG/DL
HCT VFR BLD CALC: 50.2 % (ref 37–47)
HEMOGLOBIN: 16.2 GM/DL (ref 12–16)
IMMATURE GRANS (ABS): 0.03 THOU/MM3 (ref 0–0.07)
IMMATURE GRANULOCYTES: 0.6 %
KETONES, URINE: 15
LACTIC ACID: 1.3 MMOL/L (ref 0.5–2.2)
LD: 297 U/L (ref 100–190)
LEUKOCYTE ESTERASE, URINE: NEGATIVE
LYMPHOCYTES # BLD: 22.4 %
LYMPHOCYTES ABSOLUTE: 1.1 THOU/MM3 (ref 1–4.8)
MAGNESIUM: 1.7 MG/DL (ref 1.6–2.4)
MCH RBC QN AUTO: 27.1 PG (ref 26–33)
MCHC RBC AUTO-ENTMCNC: 32.3 GM/DL (ref 32.2–35.5)
MCV RBC AUTO: 84.1 FL (ref 81–99)
MISCELLANEOUS 2: ABNORMAL
MONOCYTES # BLD: 10.8 %
MONOCYTES ABSOLUTE: 0.6 THOU/MM3 (ref 0.4–1.3)
NITRITE, URINE: NEGATIVE
NUCLEATED RED BLOOD CELLS: 0 /100 WBC
OSMOLALITY CALCULATION: 264.2 MOSMOL/KG (ref 275–300)
OSMOLALITY: 288 MOSMOL/KG (ref 275–295)
PH UA: 6 (ref 5–9)
PHOSPHORUS: 2.5 MG/DL (ref 2.4–4.7)
PLATELET # BLD: 144 THOU/MM3 (ref 130–400)
PMV BLD AUTO: 10.8 FL (ref 9.4–12.4)
POTASSIUM SERPL-SCNC: 4.5 MEQ/L (ref 3.5–5.2)
PRO-BNP: 183.1 PG/ML (ref 0–900)
PROTEIN UA: 30
RBC # BLD: 5.97 MILL/MM3 (ref 4.2–5.4)
RBC URINE: ABNORMAL /HPF
RENAL EPITHELIAL, UA: ABNORMAL
SARS-COV-2: DETECTED
SEG NEUTROPHILS: 65.6 %
SEGMENTED NEUTROPHILS ABSOLUTE COUNT: 3.3 THOU/MM3 (ref 1.8–7.7)
SODIUM BLD-SCNC: 125 MEQ/L (ref 135–145)
SODIUM BLD-SCNC: 127 MEQ/L (ref 135–145)
SODIUM BLD-SCNC: 129 MEQ/L (ref 135–145)
SPECIFIC GRAVITY, URINE: > 1.03 (ref 1–1.03)
TOTAL CK: 110 U/L (ref 30–135)
TOTAL PROTEIN: 6.5 G/DL (ref 6.1–8)
TOTAL PROTEIN: 7.1 G/DL (ref 6.1–8)
TROPONIN T: < 0.01 NG/ML
UROBILINOGEN, URINE: 2 EU/DL (ref 0–1)
WBC # BLD: 5.1 THOU/MM3 (ref 4.8–10.8)
WBC UA: ABNORMAL /HPF
YEAST: ABNORMAL

## 2020-12-12 PROCEDURE — 81001 URINALYSIS AUTO W/SCOPE: CPT

## 2020-12-12 PROCEDURE — 85730 THROMBOPLASTIN TIME PARTIAL: CPT

## 2020-12-12 PROCEDURE — 6360000002 HC RX W HCPCS: Performed by: STUDENT IN AN ORGANIZED HEALTH CARE EDUCATION/TRAINING PROGRAM

## 2020-12-12 PROCEDURE — 6360000002 HC RX W HCPCS: Performed by: EMERGENCY MEDICINE

## 2020-12-12 PROCEDURE — 83880 ASSAY OF NATRIURETIC PEPTIDE: CPT

## 2020-12-12 PROCEDURE — 6370000000 HC RX 637 (ALT 250 FOR IP): Performed by: STUDENT IN AN ORGANIZED HEALTH CARE EDUCATION/TRAINING PROGRAM

## 2020-12-12 PROCEDURE — 86140 C-REACTIVE PROTEIN: CPT

## 2020-12-12 PROCEDURE — 99285 EMERGENCY DEPT VISIT HI MDM: CPT

## 2020-12-12 PROCEDURE — 36415 COLL VENOUS BLD VENIPUNCTURE: CPT

## 2020-12-12 PROCEDURE — 82248 BILIRUBIN DIRECT: CPT

## 2020-12-12 PROCEDURE — 82728 ASSAY OF FERRITIN: CPT

## 2020-12-12 PROCEDURE — 84484 ASSAY OF TROPONIN QUANT: CPT

## 2020-12-12 PROCEDURE — 85385 FIBRINOGEN ANTIGEN: CPT

## 2020-12-12 PROCEDURE — 82550 ASSAY OF CK (CPK): CPT

## 2020-12-12 PROCEDURE — 85025 COMPLETE CBC W/AUTO DIFF WBC: CPT

## 2020-12-12 PROCEDURE — 80053 COMPREHEN METABOLIC PANEL: CPT

## 2020-12-12 PROCEDURE — 83930 ASSAY OF BLOOD OSMOLALITY: CPT

## 2020-12-12 PROCEDURE — 83605 ASSAY OF LACTIC ACID: CPT

## 2020-12-12 PROCEDURE — 93010 ELECTROCARDIOGRAM REPORT: CPT | Performed by: NUCLEAR MEDICINE

## 2020-12-12 PROCEDURE — 83735 ASSAY OF MAGNESIUM: CPT

## 2020-12-12 PROCEDURE — XW033E5 INTRODUCTION OF REMDESIVIR ANTI-INFECTIVE INTO PERIPHERAL VEIN, PERCUTANEOUS APPROACH, NEW TECHNOLOGY GROUP 5: ICD-10-PCS | Performed by: INTERNAL MEDICINE

## 2020-12-12 PROCEDURE — 2580000003 HC RX 258: Performed by: STUDENT IN AN ORGANIZED HEALTH CARE EDUCATION/TRAINING PROGRAM

## 2020-12-12 PROCEDURE — 94761 N-INVAS EAR/PLS OXIMETRY MLT: CPT

## 2020-12-12 PROCEDURE — 84295 ASSAY OF SERUM SODIUM: CPT

## 2020-12-12 PROCEDURE — 93005 ELECTROCARDIOGRAM TRACING: CPT | Performed by: EMERGENCY MEDICINE

## 2020-12-12 PROCEDURE — 2580000003 HC RX 258: Performed by: EMERGENCY MEDICINE

## 2020-12-12 PROCEDURE — U0002 COVID-19 LAB TEST NON-CDC: HCPCS

## 2020-12-12 PROCEDURE — 85379 FIBRIN DEGRADATION QUANT: CPT

## 2020-12-12 PROCEDURE — 2060000000 HC ICU INTERMEDIATE R&B

## 2020-12-12 PROCEDURE — 80076 HEPATIC FUNCTION PANEL: CPT

## 2020-12-12 PROCEDURE — 87040 BLOOD CULTURE FOR BACTERIA: CPT

## 2020-12-12 PROCEDURE — 84100 ASSAY OF PHOSPHORUS: CPT

## 2020-12-12 PROCEDURE — 83615 LACTATE (LD) (LDH) ENZYME: CPT

## 2020-12-12 PROCEDURE — 71045 X-RAY EXAM CHEST 1 VIEW: CPT

## 2020-12-12 PROCEDURE — 82948 REAGENT STRIP/BLOOD GLUCOSE: CPT

## 2020-12-12 RX ORDER — ONDANSETRON 2 MG/ML
4 INJECTION INTRAMUSCULAR; INTRAVENOUS EVERY 6 HOURS PRN
Status: DISCONTINUED | OUTPATIENT
Start: 2020-12-12 | End: 2020-12-18 | Stop reason: HOSPADM

## 2020-12-12 RX ORDER — ACETAMINOPHEN 650 MG/1
650 SUPPOSITORY RECTAL EVERY 6 HOURS PRN
Status: DISCONTINUED | OUTPATIENT
Start: 2020-12-12 | End: 2020-12-18 | Stop reason: HOSPADM

## 2020-12-12 RX ORDER — ASPIRIN 81 MG/1
81 TABLET, CHEWABLE ORAL DAILY
Status: DISCONTINUED | OUTPATIENT
Start: 2020-12-12 | End: 2020-12-18 | Stop reason: HOSPADM

## 2020-12-12 RX ORDER — ACETAMINOPHEN 325 MG/1
650 TABLET ORAL EVERY 6 HOURS PRN
Status: DISCONTINUED | OUTPATIENT
Start: 2020-12-12 | End: 2020-12-18 | Stop reason: HOSPADM

## 2020-12-12 RX ORDER — DEXTROSE MONOHYDRATE 50 MG/ML
100 INJECTION, SOLUTION INTRAVENOUS PRN
Status: DISCONTINUED | OUTPATIENT
Start: 2020-12-12 | End: 2020-12-18 | Stop reason: HOSPADM

## 2020-12-12 RX ORDER — AMLODIPINE BESYLATE 5 MG/1
5 TABLET ORAL DAILY
Status: DISCONTINUED | OUTPATIENT
Start: 2020-12-12 | End: 2020-12-18 | Stop reason: HOSPADM

## 2020-12-12 RX ORDER — CLOPIDOGREL BISULFATE 75 MG/1
75 TABLET ORAL DAILY
Status: DISCONTINUED | OUTPATIENT
Start: 2020-12-12 | End: 2020-12-18 | Stop reason: HOSPADM

## 2020-12-12 RX ORDER — DEXTROSE MONOHYDRATE 25 G/50ML
12.5 INJECTION, SOLUTION INTRAVENOUS PRN
Status: DISCONTINUED | OUTPATIENT
Start: 2020-12-12 | End: 2020-12-18 | Stop reason: HOSPADM

## 2020-12-12 RX ORDER — SODIUM CHLORIDE 0.9 % (FLUSH) 0.9 %
10 SYRINGE (ML) INJECTION PRN
Status: DISCONTINUED | OUTPATIENT
Start: 2020-12-12 | End: 2020-12-18 | Stop reason: HOSPADM

## 2020-12-12 RX ORDER — CYCLOBENZAPRINE HCL 10 MG
10 TABLET ORAL 3 TIMES DAILY PRN
Status: DISCONTINUED | OUTPATIENT
Start: 2020-12-12 | End: 2020-12-18 | Stop reason: HOSPADM

## 2020-12-12 RX ORDER — SODIUM CHLORIDE 9 MG/ML
INJECTION, SOLUTION INTRAVENOUS CONTINUOUS
Status: DISCONTINUED | OUTPATIENT
Start: 2020-12-12 | End: 2020-12-13

## 2020-12-12 RX ORDER — PRAVASTATIN SODIUM 80 MG/1
80 TABLET ORAL DAILY
Status: DISCONTINUED | OUTPATIENT
Start: 2020-12-12 | End: 2020-12-18 | Stop reason: HOSPADM

## 2020-12-12 RX ORDER — ATENOLOL 100 MG/1
100 TABLET ORAL DAILY
Status: DISCONTINUED | OUTPATIENT
Start: 2020-12-12 | End: 2020-12-18 | Stop reason: HOSPADM

## 2020-12-12 RX ORDER — ASCORBIC ACID 500 MG
250 TABLET ORAL DAILY
Status: DISCONTINUED | OUTPATIENT
Start: 2020-12-12 | End: 2020-12-18 | Stop reason: HOSPADM

## 2020-12-12 RX ORDER — NICOTINE POLACRILEX 4 MG
15 LOZENGE BUCCAL PRN
Status: DISCONTINUED | OUTPATIENT
Start: 2020-12-12 | End: 2020-12-18 | Stop reason: HOSPADM

## 2020-12-12 RX ORDER — DEXAMETHASONE 4 MG/1
6 TABLET ORAL ONCE
Status: COMPLETED | OUTPATIENT
Start: 2020-12-12 | End: 2020-12-12

## 2020-12-12 RX ORDER — INSULIN GLARGINE 100 [IU]/ML
10 INJECTION, SOLUTION SUBCUTANEOUS EVERY MORNING
Status: ON HOLD | COMMUNITY
End: 2020-12-18 | Stop reason: HOSPADM

## 2020-12-12 RX ORDER — SODIUM CHLORIDE 0.9 % (FLUSH) 0.9 %
10 SYRINGE (ML) INJECTION EVERY 12 HOURS SCHEDULED
Status: DISCONTINUED | OUTPATIENT
Start: 2020-12-12 | End: 2020-12-18 | Stop reason: HOSPADM

## 2020-12-12 RX ORDER — POLYETHYLENE GLYCOL 3350 17 G/17G
17 POWDER, FOR SOLUTION ORAL DAILY PRN
Status: DISCONTINUED | OUTPATIENT
Start: 2020-12-12 | End: 2020-12-18 | Stop reason: HOSPADM

## 2020-12-12 RX ORDER — ONDANSETRON 4 MG/1
4 TABLET, ORALLY DISINTEGRATING ORAL EVERY 8 HOURS PRN
Status: DISCONTINUED | OUTPATIENT
Start: 2020-12-12 | End: 2020-12-18 | Stop reason: HOSPADM

## 2020-12-12 RX ADMIN — ENOXAPARIN SODIUM 40 MG: 40 INJECTION SUBCUTANEOUS at 11:58

## 2020-12-12 RX ADMIN — ACETAMINOPHEN 650 MG: 325 TABLET ORAL at 12:52

## 2020-12-12 RX ADMIN — INSULIN LISPRO 12 UNITS: 100 INJECTION, SOLUTION INTRAVENOUS; SUBCUTANEOUS at 18:22

## 2020-12-12 RX ADMIN — AMLODIPINE BESYLATE 5 MG: 5 TABLET ORAL at 12:29

## 2020-12-12 RX ADMIN — INSULIN LISPRO 12 UNITS: 100 INJECTION, SOLUTION INTRAVENOUS; SUBCUTANEOUS at 11:58

## 2020-12-12 RX ADMIN — SODIUM CHLORIDE: 9 INJECTION, SOLUTION INTRAVENOUS at 07:44

## 2020-12-12 RX ADMIN — ASPIRIN 81 MG: 81 TABLET, CHEWABLE ORAL at 12:29

## 2020-12-12 RX ADMIN — SODIUM CHLORIDE, PRESERVATIVE FREE 10 ML: 5 INJECTION INTRAVENOUS at 11:57

## 2020-12-12 RX ADMIN — CLOPIDOGREL BISULFATE 75 MG: 75 TABLET ORAL at 12:29

## 2020-12-12 RX ADMIN — SODIUM CHLORIDE, PRESERVATIVE FREE 10 ML: 5 INJECTION INTRAVENOUS at 20:18

## 2020-12-12 RX ADMIN — ENOXAPARIN SODIUM 40 MG: 40 INJECTION SUBCUTANEOUS at 20:17

## 2020-12-12 RX ADMIN — DEXAMETHASONE 6 MG: 4 TABLET ORAL at 07:39

## 2020-12-12 RX ADMIN — PRAVASTATIN SODIUM 80 MG: 80 TABLET ORAL at 20:18

## 2020-12-12 RX ADMIN — OXYCODONE HYDROCHLORIDE AND ACETAMINOPHEN 250 MG: 500 TABLET ORAL at 12:51

## 2020-12-12 RX ADMIN — ATENOLOL 100 MG: 100 TABLET ORAL at 12:52

## 2020-12-12 ASSESSMENT — ENCOUNTER SYMPTOMS
SORE THROAT: 0
ABDOMINAL DISTENTION: 0
EYE ITCHING: 0
CONSTIPATION: 0
EYE PAIN: 0
ABDOMINAL PAIN: 0
SHORTNESS OF BREATH: 0
COUGH: 0
WHEEZING: 0
VOMITING: 1
EYE REDNESS: 0
CHEST TIGHTNESS: 0
DIARRHEA: 1
BACK PAIN: 0
STRIDOR: 0
RHINORRHEA: 0
PHOTOPHOBIA: 0
NAUSEA: 1
EYE DISCHARGE: 0

## 2020-12-12 ASSESSMENT — PAIN SCALES - GENERAL
PAINLEVEL_OUTOF10: 0
PAINLEVEL_OUTOF10: 0

## 2020-12-12 NOTE — FLOWSHEET NOTE
responded to ACP consult for AD's.  spoke with the pt over the phone. Pt declined help with AD forms. Pt is listed as Jehovah Witness.  asked pt about the form from her [employer] that speaks about not receiving whole blood products, which is not on the pt's chart. The pt stated she would have her  bring the form in. Pt does want the bracelet and magnet on her door.  will ensure these are put in place. Spiritual care to continue to follow up on blood products form and to provide spiritual support. 12/12/20 1500   Encounter Summary   Services provided to: Patient   Referral/Consult From: Other disciplines   Support System Spouse   Place of Ho[employer]ism   (Joanne Mccartney of [place] [employer] )   Contact Adventism Completed   Complexity of Encounter Moderate   Length of Encounter 15 minutes   Advance Care Planning Yes   Routine   Type Initial   Assessment Approachable;Passive   Intervention Active listening;Explored coping resources; Empowerment   Outcome Acceptance;Engaged in conversation

## 2020-12-12 NOTE — ED NOTES
Patient ambulates to restroom in room with assist x1 on room air. Patient dyspneic upon exertion. Clear, light yellow urine. Patient returned to bed with oxygen replaced at 2L nc. Patient updated on plan of care. Call light in reach.      Laurent Bobo RN  12/12/20 4584

## 2020-12-12 NOTE — ED NOTES
Pt spoke with  on phone in room at this time. Pt aware of POC to admit and of COVID result. VS updated. Pt continues to breathe heavily, but is more relaxed and resting with eyes closed. Will continue to monitor.       Ca Gunn RN  12/12/20 8329

## 2020-12-12 NOTE — ED NOTES
Patient up to bedside commode with assist x 1. Clear light yellow urine noted. Patient updated on plan of care. Patient denies pain. Respirations slightly labored with exertion. Call light in reach.      Tenzin Borrego RN  12/12/20 8195

## 2020-12-12 NOTE — ED NOTES
ED to inpatient nurses report    No chief complaint on file. Present to ED from home  LOC: alert and orientated to name, place, date  Vital signs   Vitals:    12/12/20 0602 12/12/20 0640   BP: (!) 150/89 (!) 157/92   Pulse: 93 85   Resp: 29 26   SpO2: 96% 96%   Weight: (!) 325 lb (147.4 kg)    Height: 5' 8\" (1.727 m)       Oxygen Baseline RA - on 2L NC at this time    Current needs required None at this time. Placement for admitting. Bipap/Cpap No  LDAs:   Peripheral IV 12/12/20 Right Upper arm (Active)   Site Assessment Clean;Dry; Intact 12/12/20 0641   Line Status Normal saline locked 12/12/20 0641   Dressing Status Clean; Intact;Dry 12/12/20 0641       Peripheral IV 12/12/20 Right Antecubital (Active)   Site Assessment Clean;Dry; Intact 12/12/20 0642   Line Status Normal saline locked;Brisk blood return;Flushed 12/12/20 9722   Dressing Status Clean;Dry; Intact 12/12/20 5127     Mobility: Requires assistance * 2 pt is normally independent at home, but is too weak to move on her own at this time. Pending ED orders: none  Present condition: pt resting comfortably in cot. Pt is breathing labored, but oxygen saturation is better with 2L NC. Pt heart rate fluctuates up to 120s and back down into the 80s. Pt alert and oriented. Person of contract Georgi Marquis, , phone number [phone]  Our promise was given to family,  and pt.     Electronically signed by Michael Mackenzie RN on 12/12/2020 at 6:55 AM       Michael Mackenzie RN  12/12/20 0700

## 2020-12-12 NOTE — H&P
Hospitalist - History & Physical      Patient: Rita Stout    Unit/Bed:24/024A  YOB: 1955  MRN: [id number]   Acct: [de-identified]   PCP: PATRICIO Beauchamp - CNP    Date of Service: Pt seen/examined on 12/12/20  and Admitted to Inpatient with expected LOS greater than two midnights due to medical therapy. Chief Complaint:  Bilateral lower extremity weakness, cough, diarrhea     Assessment and Plan:    1. Pneumonia 2/2 Covid-19 infection - patient positive for Covid-19 with chest xray demonstrating bilateral perihilar and basilar consolidations. Symptoms: diarrhea, b/l lower extremity weakness, non-productive cough. SpO2 96% on RA with patient receiving decadron 6 mg x1.   - continue hydration with 100 cc/hr normal saline  - check CRP, D-dimer, ferritin, fibrinogen, LAD  - if SpO2<90%, initiate decadron, remdesevir, convalescent plasma  - droplet precautions     2. Moderate Hyponatremia - sodium level 125 but when corrected for glucose 350->sodium 129. Patient has no signs of confusion, is not lethargic. This could be due to SIADH vs Hyperosmolar hyponatremia vs hypovolemia in the setting of diarrhea. - urine osmolality->pending  - urine sodium->pending   - check sodium level q4h x6, goal is to keep sodium level >136   - continue to monitor mental status    3. Bilateral lower extremity weakness - hx of PAD with claudication, recent infection with COVID virus, hx of Spondylolisthesis, hx of spinal stenosis which could be contributing to the symptoms. Her recent diarrhea may have caused electrolyte disturbance which in return causes weakness of the extremities. - check CK, potassium level, magnesium level, phos level. Replete as needed. - if sx do not resolve, will get an MRI    4. Diabetes mellitis II - no diabetic medications on file but blood glucose on presentation 350.  - last Hemoglobin 15.4 (11/4/20)  - will add high dose ISS and monitor qHS and qAC, hypoglycemic protocol     5. CAD s/p angioplasty of the RT SFA (2019) - denies chest pain/SOB. - continue Plavix 75 mg daily, ASA 81 mg daily     6. HTN - controlled. Continue atenolol 100 mg daily, Amlodipine 5 mg daily     7. HLD - controlled. Continue pravastatin 81 mg.      8. Chronic lower extremity edema - Will hold Lasix 20 mg due to diarrhea and possible electrolyte abnormality     History Of Present Illness:      Angle Singleton is a 77year old female who presents to the Emergency department with complain of bilateral lower extremity weakness of 5 day duration and generalized fatigue. She has PMHx significant for CAD s/p angioplasty with baloon of the RT SFA (2019), PAD with claudication, History of CVA/TIA, Cryptogenic stroke (2010), HTN, HLD, Spinal stenosis, Spondylolisthesis, SOB on exertion, chronic lower extremity edema, Morbid obesity. Patient has been experiencing non-productive cough that started 5 days ago without associated chest pain or shortness of breath at rest. She has been having non-bloody, watery diarrhea without any mucous,  for the same period as well. She denies having fevers or chills. Denies abdominal pain, weakness in the upper extremity, lightheadedness or dizziness. She states that she is not able to walk or stand on her legs. In the emergency room, her initial vital signs are: respiratory rate 29, heart rate 93, blood pressure 150/89, SpO2 96% on RA. Lab results indicate: Sodium 125, potassium 4.5, chloride 92, Bicarb 21, glucose 350, BUN 9, creatinine 0.7, calcium 8.6, total protein 6.5, AST 24, ALT 20, total bili 0.5, ALK 47, lactic acid 1.3, troponin <0.01, . COVID-19->positive. Chest xray reveals streaky bilateral perihilar and basilar consolidations. ED treatment: Normal saline bolus, Decadron 6 mg x1.       Past Medical History:        Diagnosis Date    CAD (coronary artery disease)     Degenerative lumbar spinal stenosis 1/20/2015    GERD (gastroesophageal reflux disease)     Gout  History of MI (myocardial infarction) 2004    History of stroke     Hyperlipidemia     Hypertension     Morbid obesity with body mass index of 50.0-59.9 in adult [facility])     Spondylolisthesis of lumbar region 1/20/2015    Bilateral L5 pars defects.  Unspecified cerebral artery occlusion with cerebral infarction 2010       Past Surgical History:        Procedure Laterality Date    BACK SURGERY      COLONOSCOPY      ENDOSCOPY, COLON, DIAGNOSTIC      JOINT REPLACEMENT Bilateral     knees    KNEE SURGERY  2006    x2    [facility] ECHO TRANSESOPHAG R-T 2D IMG ACQUISJ I&R ONLY Left 6/8/2018    TRANSESOPHAGEAL ECHOCARDIOGRAM performed by Tang Logan MD at [address] Right 06/2019    x3    VASCULAR SURGERY      left carotid       Home Medications:   No current facility-administered medications on file prior to encounter. Current Outpatient Medications on File Prior to Encounter   Medication Sig Dispense Refill    cyclobenzaprine (FLEXERIL) 10 MG tablet Take 1 tablet by mouth 3 times daily as needed for Muscle spasms 12 tablet 0    ketorolac (TORADOL) 10 MG tablet Take 1 tablet by mouth every 6 hours as needed for Pain 15 tablet 0    Cyanocobalamin (VITAMIN B 12 PO) Take by mouth      clopidogrel (PLAVIX) 75 MG tablet Take 1 tablet by mouth daily 30 tablet 7    polyethylene glycol (GLYCOLAX) powder Dispense 238 Gram Bottle. Use as Directed 238 g 0    pravastatin (PRAVACHOL) 80 MG tablet Take 80 mg by mouth daily      potassium citrate (UROCIT-K) 10 MEQ (1080 MG) extended release tablet Take 20 mEq by mouth 2 times daily      furosemide (LASIX) 20 MG tablet Take 20 mg by mouth daily as needed       aspirin 81 MG chewable tablet Take 1 tablet by mouth daily 30 tablet 3    Ascorbic Acid (VITAMIN C) 250 MG tablet Take 250 mg by mouth daily      amLODIPine (NORVASC) 5 MG tablet Take 5 mg by mouth daily.       atenolol (TENORMIN) 100 MG tablet Take 100 mg by mouth. 1/2 tab daily          Allergies:    Codeine and Vicodin [hydrocodone-acetaminophen]    Social History:    reports that she has never smoked. She has never used smokeless tobacco. She reports that she does not drink alcohol or use drugs. Family History:       Problem Relation Age of Onset    Stroke Mother     Heart Disease Father     Heart Attack Father     Cancer Sister    [facility] Cancer Sister     Diabetes Neg Hx        Diet:  No diet orders on file    Review of systems:   Pertinent positives as noted in the HPI. All other systems reviewed and negative. PHYSICAL EXAM:  BP (!) 164/95   Pulse 97   Resp 15   Ht 5' 8\" (1.727 m)   Wt (!) 325 lb (147.4 kg)   SpO2 93%   BMI 49.42 kg/m²   General appearance: No apparent distress, appears stated age and cooperative. Obese  HEENT: Normal cephalic, atraumatic without obvious deformity. Pupils equal, round, and reactive to light. Extra ocular muscles intact. Conjunctivae/corneas clear. Neck: Supple, with full range of motion. No jugular venous distention. Trachea midline. Respiratory:  Rales appreciated   Cardiovascular: Regular rate and rhythm with normal S1/S2 without murmurs, rubs or gallops. Abdomen: Soft, non-tender, non-distended with normal bowel sounds. Musculoskeletal:  No clubbing, bilateral +1 edema appreciated   Skin: Skin color, texture, turgor normal.  No rashes or lesions. Neurologic:  Neurovascularly intact without any focal sensory/motor deficits. Cranial nerves: II-XII intact, grossly non-focal.  Psychiatric: Alert and oriented, thought content appropriate, normal insight  Capillary Refill: Brisk,< 3 seconds   Peripheral Pulses: +2 palpable, equal bilaterally     Labs:   No results for input(s): WBC, HGB, HCT, PLT in the last 72 hours. No results for input(s): NA, K, CL, CO2, BUN, CREATININE, CALCIUM, PHOS in the last 72 hours.     Invalid input(s): MAGNES  No results for input(s): AST, ALT, BILIDIR, BILITOT, ALKPHOS in the last 72 hours. No results for input(s): INR in the last 72 hours. No results for input(s): Shanae Chance in the last 72 hours. Urinalysis:    Lab Results   Component Value Date    NITRU NEGATIVE 06/07/2018    WBCUA 0-2 06/07/2018    BACTERIA MODERATE 06/07/2018    RBCUA 3-5 06/07/2018    BLOODU TRACE 06/07/2018    GLUCOSEU NEGATIVE 06/07/2018       Radiology:   XR CHEST PORTABLE   Final Result   Impression:   Streaky bilateral perihilar and basilar consolidations concerning for    atypical infiltrates, new vs prior. This document has been electronically signed by: Aj Cedeño MD on    12/12/2020 05:21 AM           Xr Chest Portable    Result Date: 12/12/2020  Exam: 1V chest Comparison:  CR  - CHEST MOBILE SINGLE  - 05/31/2017 04:55 PM EDT Findings: Mediastinum: Stable moderate cardiac silhouette enlargement. Lungs: Streaky bilateral perihilar and basilar consolidations concerning for atypical infiltrates, new vs prior. Pleura: No pneumothorax or significant effusion. Bones: No acute pathology. Right shoulder arthroplasty. Impression: Streaky bilateral perihilar and basilar consolidations concerning for atypical infiltrates, new vs prior. This document has been electronically signed by:  Aj Cedeño MD on 12/12/2020 05:21 AM         EKG: Sinus rhythm with PACs, possible inferior infarct     Electronically signed by Natty Chavez MD on 12/12/2020 at 8:43 AM

## 2020-12-12 NOTE — ED NOTES
Patient ambulates self to restroom in room with RN at bedside. Patient dyspneic upon exertion with room air. Patient returned to cart, noting spO2 is 90% on room air and pulse is 105. Patient placed back on 2L NC oxygen.       Reji Kelley RN  12/12/20 5253

## 2020-12-12 NOTE — ED NOTES
Patient assisted x1 back to cart from bedside commode. Patient tolerates with dyspnea with exertion. Patient denies pain. Patient provided ice water. Call light in reach.      Barron Resendiz RN  12/12/20 5421

## 2020-12-12 NOTE — ED NOTES
Patient transferred to room 40 in stable condition. Patient updated on pending admission status and need for inpatient hold. Patient was incontinent of urine, stating she was unable to wait for bedside commode. Patient cleansed with washcloth and moved to clean linen bed. .  Patient able to transfer with assist x1 to inpatient bed from ED cart. Patient oxygen nasal cannula at 2L. Patient medication list reviewed. Patient placed on cardiac monitor, thermostat adjusted for comfort. Call light in reach.      Mayuri Hutchins RN  12/12/20 Cb Ying RN  12/12/20 2842 36.7

## 2020-12-12 NOTE — ED PROVIDER NOTES
[address] ENCOUNTER      PATIENT NAME: Alban Burton  MRN: [id number]  : 1955  ALEXANDER: 2020  PROVIDER: Jagruti Prescott MD      CHIEF COMPLAINT     No chief complaint on file. Nurses Notes reviewed and I agreeexcept as noted in the HPI. HISTORY OF PRESENT ILLNESS    Alban Burton is a 77 y.o. female who presents to Emergency Department with cough, diarrhea, and the diffuse weakness. 78-year-old -American female brought in by EMS because of general weakness with watery diarrhea and loss of 5 days duration. Patient stays at home. No pain. No fever and no chills. Moderate nonproductive cough. No recent use off antibiotics. Diarrhea is watery, no blood and no mucus. This HPI was provided by the patient. REVIEW OF SYSTEMS     Review of Systems   Constitutional: Positive for activity change, appetite change and fatigue. Negative for chills, fever and unexpected weight change. HENT: Negative for congestion, ear discharge, ear pain, hearing loss, nosebleeds, rhinorrhea and sore throat. Eyes: Negative for photophobia, pain, discharge, redness and itching. Respiratory: Negative for cough, chest tightness, shortness of breath, wheezing and stridor. Cardiovascular: Negative for chest pain, palpitations and leg swelling. Gastrointestinal: Positive for diarrhea, nausea and vomiting. Negative for abdominal distention, abdominal pain and constipation. Endocrine: Negative for cold intolerance, heat intolerance, polydipsia and polyphagia. Genitourinary: Negative for dysuria, flank pain, frequency and hematuria. Musculoskeletal: Negative for arthralgias, back pain, gait problem, myalgias, neck pain and neck stiffness. Skin: Negative for pallor, rash and wound. Allergic/Immunologic: Negative for environmental allergies and food allergies. Neurological: Positive for weakness. Negative for dizziness, tremors, syncope and headaches. Psychiatric/Behavioral: Negative for agitation, behavioral problems, confusion, self-injury, sleep disturbance and suicidal ideas. PAST MEDICAL HISTORY     Past Medical History:   Diagnosis Date    CAD (coronary artery disease)     Degenerative lumbar spinal stenosis 1/20/2015    GERD (gastroesophageal reflux disease)     Gout     History of MI (myocardial infarction) 2004    History of stroke     Hyperlipidemia     Hypertension     Morbid obesity with body mass index of 50.0-59.9 in adult [facility])     Spondylolisthesis of lumbar region 1/20/2015    Bilateral L5 pars defects.  Unspecified cerebral artery occlusion with cerebral infarction 2010       SURGICAL HISTORY       Past Surgical History:   Procedure Laterality Date    BACK SURGERY      COLONOSCOPY      ENDOSCOPY, COLON, DIAGNOSTIC      JOINT REPLACEMENT Bilateral     knees    KNEE SURGERY  2006    x2    NV ECHO TRANSESOPHAG R-T 2D IMG ACQUISJ I&R ONLY Left 6/8/2018    TRANSESOPHAGEAL ECHOCARDIOGRAM performed by Hailey Nelson MD at [address] Right 06/2019    x3    VASCULAR SURGERY      left carotid       CURRENT MEDICATIONS       Previous Medications    AMLODIPINE (NORVASC) 5 MG TABLET    Take 5 mg by mouth daily. ASCORBIC ACID (VITAMIN C) 250 MG TABLET    Take 250 mg by mouth daily    ASPIRIN 81 MG CHEWABLE TABLET    Take 1 tablet by mouth daily    ATENOLOL (TENORMIN) 100 MG TABLET    Take 100 mg by mouth. 1/2 tab daily     CLOPIDOGREL (PLAVIX) 75 MG TABLET    Take 1 tablet by mouth daily    CYANOCOBALAMIN (VITAMIN B 12 PO)    Take by mouth    CYCLOBENZAPRINE (FLEXERIL) 10 MG TABLET    Take 1 tablet by mouth 3 times daily as needed for Muscle spasms    FUROSEMIDE (LASIX) 20 MG TABLET    Take 20 mg by mouth daily as needed     KETOROLAC (TORADOL) 10 MG TABLET    Take 1 tablet by mouth every 6 hours as needed for Pain    POLYETHYLENE GLYCOL (GLYCOLAX) POWDER    Dispense 238 Gram Bottle. Use as Directed    POTASSIUM CITRATE (UROCIT-K) 10 MEQ (1080 MG) EXTENDED RELEASE TABLET    Take 20 mEq by mouth 2 times daily    PRAVASTATIN (PRAVACHOL) 80 MG TABLET    Take 80 mg by mouth daily       ALLERGIES     Codeine and Vicodin [hydrocodone-acetaminophen]    FAMILY HISTORY     She indicated that her mother is . She indicated that her father is . She indicated that both of her sisters are . She indicated that the status of her neg hx is unknown.   family history includes Cancer in her sister and sister; Heart Attack in her father; Heart Disease in her father; Stroke in her mother. SOCIAL HISTORY      reports that she has never smoked. She has never used smokeless tobacco. She reports that she does not drink alcohol or use drugs. PHYSICAL EXAM     INITIAL VITALS:  height is 5' 8\" (1.727 m) and weight is 325 lb (147.4 kg) (abnormal). Her blood pressure is 161/94 (abnormal) and her pulse is 97. Her respiration is 25 and oxygen saturation is 93%. Physical Exam  Vitals signs and nursing note reviewed. Constitutional:       Appearance: She is well-developed. She is not diaphoretic. HENT:      Head: Normocephalic and atraumatic. Nose: Nose normal.   Eyes:      General: No scleral icterus. Right eye: No discharge. Left eye: No discharge. Conjunctiva/sclera: Conjunctivae normal.      Pupils: Pupils are equal, round, and reactive to light. Neck:      Musculoskeletal: Normal range of motion and neck supple. Vascular: No JVD. Trachea: No tracheal deviation. Cardiovascular:      Rate and Rhythm: Normal rate and regular rhythm. Heart sounds: Normal heart sounds. No murmur. No friction rub. No gallop. Pulmonary:      Effort: Pulmonary effort is normal. No respiratory distress. Breath sounds: No stridor. Rales present. No wheezing. Comments: From both posterior lower fields  Chest:      Chest wall: No tenderness.    Abdominal: General: Bowel sounds are normal. There is no distension. Palpations: Abdomen is soft. There is no mass. Tenderness: There is no abdominal tenderness. There is no guarding or rebound. Hernia: No hernia is present. Musculoskeletal:         General: No tenderness or deformity. Lymphadenopathy:      Cervical: No cervical adenopathy. Skin:     General: Skin is warm and dry. Capillary Refill: Capillary refill takes less than 2 seconds. Coloration: Skin is not pale. Findings: No erythema or rash. Neurological:      Mental Status: She is alert and oriented to person, place, and time. Cranial Nerves: No cranial nerve deficit. Sensory: No sensory deficit. Motor: No abnormal muscle tone. Coordination: Coordination normal.      Deep Tendon Reflexes: Reflexes normal.   Psychiatric:         Behavior: Behavior normal.         Thought Content: Thought content normal.         Judgment: Judgment normal.         DIFFERENTIAL DIAGNOSIS:   Covid pneumonia, dehydration, electrolyte imbalance, AMI, sepsis, UTI, community-acquired pneumonia    DIAGNOSTIC RESULTS   EKG: All EKG's are interpreted by the Emergency Department Physician who either signs or Co-signsthis chart in the absence of a cardiologist.  Interpreted by me    EKG interpreted by me as sinus rhythm, occasional PVC, ventricular rate 96 bpm, IL interval 164 ms, QRS duration 96 ms,  ms, no ST elevation or acute T-wave      RADIOLOGY: non-plain film images(s) such as CT, Ultrasound and MRI are read by the radiologist.  XR CHEST PORTABLE   Final Result   Impression:   Streaky bilateral perihilar and basilar consolidations concerning for    atypical infiltrates, new vs prior. This document has been electronically signed by: Ashley Houser MD on    12/12/2020 05:21 AM             LABS:     Labs were done during [facility] downtime, they were not available in [facility] currently.     WBC 5.1, hemoglobin 6.2, PTT 22    Sodium 125, potassium 4.5, chloride 92, CO2 21, glucose 350, BUN 9, creatinine 0.7, calcium 8.6, total protein 6.5, AST 24, ALT 20, total bilirubin 0.5, alkaline phosphatase 47, lactic acid 1.3, troponin less than 0.01,     Covid positive      Results for orders placed or performed during the hospital encounter of 12/12/20   EKG 12 Lead   Result Value Ref Range    Ventricular Rate 96 BPM    Atrial Rate 96 BPM    P-R Interval 164 ms    QRS Duration 96 ms    Q-T Interval 368 ms    QTc Calculation (Bazett) 464 ms    P Axis 44 degrees    R Axis 60 degrees    T Axis 20 degrees       EMERGENCY DEPARTMENT COURSE:   Vitals:    Vitals:    12/12/20 0602 12/12/20 0640 12/12/20 0700   BP: (!) 150/89 (!) 157/92 (!) 161/94   Pulse: 93 85 97   Resp: 29 26 25   SpO2: 96% 96% 93%   Weight: (!) 325 lb (147.4 kg)     Height: 5' 8\" (1.727 m)       2:36 AM: Patient is seen and evaluated in a timely fashion. ACTIONS:  Large bore IV  Tele monitor  XR CHEST PORTABLE  EKG 12-LEAD  Labs Reviewed   SODIUM, URINE, RANDOM   OSMOLALITY, URINE   OSMOLALITY, SERUM     Medications   dexamethasone (DECADRON) tablet 6 mg (has no administration in time range)   0.9 % sodium chloride infusion (has no administration in time range)       MEDICAL DECISION MAKINGS:    Her sodium is 125, Covid positive, chest x-ray shows atypical pneumonia changes. Patient has symptomatic Covid pneumonia due to Covid virus, patient has hyponatremia, patient has diffuse weakness, she required O2 in ED. Admission is warranted. ED treatment include normal saline bolus, Decadron PO. Admitted to hospitalist service. CRITICAL CARE:   None    CONSULTS:  Dr. Alejandro Gallego:  None    FINAL IMPRESSION      1. Pneumonia due to COVID-19 virus    2. Hyponatremia    3. Gastroenteritis          DISPOSITION/PLAN   Admit    PATIENT REFERRED TO:  No follow-up provider specified.     DISCHARGE MEDICATIONS:  New Prescriptions    No medications on file       (Please note that portions of this note were completed with a voice recognition program.  Efforts were made to edit the dictations but occasionally words aremis-transcribed.)    MD Peace Qiu MD  12/12/20 0778       Peace Lancaster MD  12/12/20 0519

## 2020-12-12 NOTE — ED NOTES
See prior paper charting from Down time for information prior to Epic.      Neema Mortensen RN  12/12/20 1746

## 2020-12-13 LAB
ALBUMIN SERPL-MCNC: 3.3 G/DL (ref 3.5–5.1)
ALP BLD-CCNC: 47 U/L (ref 38–126)
ALT SERPL-CCNC: 20 U/L (ref 11–66)
ANION GAP SERPL CALCULATED.3IONS-SCNC: 9 MEQ/L (ref 8–16)
AST SERPL-CCNC: 24 U/L (ref 5–40)
BASOPHILS # BLD: 0.2 %
BASOPHILS ABSOLUTE: 0 THOU/MM3 (ref 0–0.1)
BILIRUB SERPL-MCNC: 0.5 MG/DL (ref 0.3–1.2)
BUN BLDV-MCNC: 13 MG/DL (ref 7–22)
CALCIUM SERPL-MCNC: 9.2 MG/DL (ref 8.5–10.5)
CHLORIDE BLD-SCNC: 98 MEQ/L (ref 98–111)
CO2: 25 MEQ/L (ref 23–33)
CREAT SERPL-MCNC: 0.8 MG/DL (ref 0.4–1.2)
D-DIMER QUANTITATIVE: 391 NG/ML FEU (ref 0–500)
EOSINOPHIL # BLD: 0 %
EOSINOPHILS ABSOLUTE: 0 THOU/MM3 (ref 0–0.4)
ERYTHROCYTE [DISTWIDTH] IN BLOOD BY AUTOMATED COUNT: 15.8 % (ref 11.5–14.5)
ERYTHROCYTE [DISTWIDTH] IN BLOOD BY AUTOMATED COUNT: 45.9 FL (ref 35–45)
FIBRINOGEN: 546 MG/100ML (ref 155–475)
GFR SERPL CREATININE-BSD FRML MDRD: 86 ML/MIN/1.73M2
GLUCOSE BLD-MCNC: 178 MG/DL (ref 70–108)
GLUCOSE BLD-MCNC: 251 MG/DL (ref 70–108)
GLUCOSE BLD-MCNC: 293 MG/DL (ref 70–108)
GLUCOSE BLD-MCNC: 312 MG/DL (ref 70–108)
GLUCOSE BLD-MCNC: 318 MG/DL (ref 70–108)
HCT VFR BLD CALC: 53.9 % (ref 37–47)
HEMOGLOBIN: 17.2 GM/DL (ref 12–16)
IMMATURE GRANS (ABS): 0.03 THOU/MM3 (ref 0–0.07)
IMMATURE GRANULOCYTES: 0.5 %
LYMPHOCYTES # BLD: 23.9 %
LYMPHOCYTES ABSOLUTE: 1.4 THOU/MM3 (ref 1–4.8)
MCH RBC QN AUTO: 26.7 PG (ref 26–33)
MCHC RBC AUTO-ENTMCNC: 31.9 GM/DL (ref 32.2–35.5)
MCV RBC AUTO: 83.8 FL (ref 81–99)
MONOCYTES # BLD: 7.9 %
MONOCYTES ABSOLUTE: 0.5 THOU/MM3 (ref 0.4–1.3)
NUCLEATED RED BLOOD CELLS: 0 /100 WBC
PLATELET # BLD: 173 THOU/MM3 (ref 130–400)
PMV BLD AUTO: 10.9 FL (ref 9.4–12.4)
POTASSIUM REFLEX MAGNESIUM: 4.4 MEQ/L (ref 3.5–5.2)
POTASSIUM SERPL-SCNC: 4.4 MEQ/L (ref 3.5–5.2)
RBC # BLD: 6.43 MILL/MM3 (ref 4.2–5.4)
REASON FOR REJECTION: NORMAL
REJECTED TEST: NORMAL
SEG NEUTROPHILS: 67.5 %
SEGMENTED NEUTROPHILS ABSOLUTE COUNT: 3.8 THOU/MM3 (ref 1.8–7.7)
SODIUM BLD-SCNC: 127 MEQ/L (ref 135–145)
SODIUM BLD-SCNC: 131 MEQ/L (ref 135–145)
SODIUM BLD-SCNC: 132 MEQ/L (ref 135–145)
TOTAL PROTEIN: 6.7 G/DL (ref 6.1–8)
WBC # BLD: 5.7 THOU/MM3 (ref 4.8–10.8)

## 2020-12-13 PROCEDURE — 36415 COLL VENOUS BLD VENIPUNCTURE: CPT

## 2020-12-13 PROCEDURE — 2580000003 HC RX 258: Performed by: INTERNAL MEDICINE

## 2020-12-13 PROCEDURE — 2060000000 HC ICU INTERMEDIATE R&B

## 2020-12-13 PROCEDURE — 6360000002 HC RX W HCPCS: Performed by: INTERNAL MEDICINE

## 2020-12-13 PROCEDURE — 85025 COMPLETE CBC W/AUTO DIFF WBC: CPT

## 2020-12-13 PROCEDURE — 97166 OT EVAL MOD COMPLEX 45 MIN: CPT

## 2020-12-13 PROCEDURE — 97110 THERAPEUTIC EXERCISES: CPT

## 2020-12-13 PROCEDURE — 84295 ASSAY OF SERUM SODIUM: CPT

## 2020-12-13 PROCEDURE — 94761 N-INVAS EAR/PLS OXIMETRY MLT: CPT

## 2020-12-13 PROCEDURE — 97535 SELF CARE MNGMENT TRAINING: CPT

## 2020-12-13 PROCEDURE — 82948 REAGENT STRIP/BLOOD GLUCOSE: CPT

## 2020-12-13 PROCEDURE — 99233 SBSQ HOSP IP/OBS HIGH 50: CPT | Performed by: INTERNAL MEDICINE

## 2020-12-13 PROCEDURE — 85379 FIBRIN DEGRADATION QUANT: CPT

## 2020-12-13 PROCEDURE — 97163 PT EVAL HIGH COMPLEX 45 MIN: CPT

## 2020-12-13 PROCEDURE — 85385 FIBRINOGEN ANTIGEN: CPT

## 2020-12-13 PROCEDURE — 6370000000 HC RX 637 (ALT 250 FOR IP): Performed by: INTERNAL MEDICINE

## 2020-12-13 PROCEDURE — 97116 GAIT TRAINING THERAPY: CPT

## 2020-12-13 PROCEDURE — 6370000000 HC RX 637 (ALT 250 FOR IP): Performed by: STUDENT IN AN ORGANIZED HEALTH CARE EDUCATION/TRAINING PROGRAM

## 2020-12-13 PROCEDURE — 2500000003 HC RX 250 WO HCPCS: Performed by: INTERNAL MEDICINE

## 2020-12-13 PROCEDURE — 6360000002 HC RX W HCPCS: Performed by: STUDENT IN AN ORGANIZED HEALTH CARE EDUCATION/TRAINING PROGRAM

## 2020-12-13 PROCEDURE — 2580000003 HC RX 258: Performed by: STUDENT IN AN ORGANIZED HEALTH CARE EDUCATION/TRAINING PROGRAM

## 2020-12-13 PROCEDURE — 80053 COMPREHEN METABOLIC PANEL: CPT

## 2020-12-13 RX ORDER — INSULIN GLARGINE 100 [IU]/ML
20 INJECTION, SOLUTION SUBCUTANEOUS EVERY MORNING
Status: DISCONTINUED | OUTPATIENT
Start: 2020-12-13 | End: 2020-12-14

## 2020-12-13 RX ORDER — ZINC SULFATE 50(220)MG
50 CAPSULE ORAL DAILY
Status: DISCONTINUED | OUTPATIENT
Start: 2020-12-13 | End: 2020-12-18 | Stop reason: HOSPADM

## 2020-12-13 RX ORDER — DEXAMETHASONE 4 MG/1
6 TABLET ORAL DAILY
Status: COMPLETED | OUTPATIENT
Start: 2020-12-13 | End: 2020-12-15

## 2020-12-13 RX ORDER — VITAMIN B COMPLEX
1000 TABLET ORAL DAILY
Status: DISCONTINUED | OUTPATIENT
Start: 2020-12-13 | End: 2020-12-18 | Stop reason: HOSPADM

## 2020-12-13 RX ORDER — 0.9 % SODIUM CHLORIDE 0.9 %
30 INTRAVENOUS SOLUTION INTRAVENOUS PRN
Status: DISCONTINUED | OUTPATIENT
Start: 2020-12-13 | End: 2020-12-18 | Stop reason: HOSPADM

## 2020-12-13 RX ADMIN — OXYCODONE HYDROCHLORIDE AND ACETAMINOPHEN 250 MG: 500 TABLET ORAL at 10:29

## 2020-12-13 RX ADMIN — ASPIRIN 81 MG: 81 TABLET, CHEWABLE ORAL at 10:28

## 2020-12-13 RX ADMIN — SODIUM CHLORIDE, PRESERVATIVE FREE 10 ML: 5 INJECTION INTRAVENOUS at 20:12

## 2020-12-13 RX ADMIN — INSULIN LISPRO 3 UNITS: 100 INJECTION, SOLUTION INTRAVENOUS; SUBCUTANEOUS at 17:36

## 2020-12-13 RX ADMIN — REMDESIVIR 200 MG: 100 INJECTION, POWDER, LYOPHILIZED, FOR SOLUTION INTRAVENOUS at 14:30

## 2020-12-13 RX ADMIN — INSULIN LISPRO 9 UNITS: 100 INJECTION, SOLUTION INTRAVENOUS; SUBCUTANEOUS at 10:36

## 2020-12-13 RX ADMIN — INSULIN GLARGINE 20 UNITS: 100 INJECTION, SOLUTION SUBCUTANEOUS at 11:44

## 2020-12-13 RX ADMIN — Medication 50 MG: at 12:27

## 2020-12-13 RX ADMIN — ENOXAPARIN SODIUM 40 MG: 40 INJECTION SUBCUTANEOUS at 10:28

## 2020-12-13 RX ADMIN — ATENOLOL 100 MG: 100 TABLET ORAL at 10:29

## 2020-12-13 RX ADMIN — PRAVASTATIN SODIUM 80 MG: 80 TABLET ORAL at 20:12

## 2020-12-13 RX ADMIN — INSULIN LISPRO 12 UNITS: 100 INJECTION, SOLUTION INTRAVENOUS; SUBCUTANEOUS at 12:22

## 2020-12-13 RX ADMIN — AMLODIPINE BESYLATE 5 MG: 5 TABLET ORAL at 10:29

## 2020-12-13 RX ADMIN — CLOPIDOGREL BISULFATE 75 MG: 75 TABLET ORAL at 10:28

## 2020-12-13 RX ADMIN — ENOXAPARIN SODIUM 40 MG: 40 INJECTION SUBCUTANEOUS at 20:10

## 2020-12-13 RX ADMIN — DEXAMETHASONE 6 MG: 4 TABLET ORAL at 14:31

## 2020-12-13 RX ADMIN — Medication 1000 UNITS: at 12:27

## 2020-12-13 NOTE — PROGRESS NOTES
to Supine: Minimal Assistance, X 1, with increased time for completion, with LEs   Scooting: Minimal Assistance    Transfers:  Sit to Stand: Minimal Assistance, X 1, from bed , mod of 2 from low toilet-pt very fatigued at that point  Stand to [address], X 1, with increased time for completion, cues for hand placement, to low toilet-pt had no eccentric control onto toilet, stand to sit on bed min of 2    Ambulation:  Moderate Assistance, X 1, with increased time for completion, on first trial going from bed to bathroom-very unsafe. On second trial Mod fo 2   Distance: 10 feet X 2  Surface: Level Tile  Device:Hand-Held Assist  Gait Deviations: Forward Flexed Posture, Slow Rosa, Decreased Step Length Bilaterally, Decreased Trunk Rotation, Decreased Gait Speed, Decreased Heel Strike Bilaterally, Moderate Path Deviations and Unsteady Gait  Rec bariatric walker and 2 A or 1 to follow with chair if getting out into cruz at all  Exercise:  Patient was guided in 1 set(s) 10 reps of exercise to both lower extremities. Ankle pumps, Heelslides, Hip abduction/adduction and Straight leg raises. Exercises were completed for increased independence with functional mobility. Functional Outcome Measures: Completed  -PAC Inpatient Mobility Raw Score : 15  AM-PAC Inpatient T-Scale Score : 39.45    ASSESSMENT:  Activity Tolerance:  Patient tolerance of  treatment: fair. O2 sats stayed in 90s, but pt was so exhausted  With min acitivity. I rec a BS commode or external catheter  Until pt has more endurance to safely walk to bathroom      Treatment Initiated: Treatment and education initiated within context of evaluation. Evaluation time included review of current medical information, gathering information related to past medical, social and functional history, completion of standardized testing, formal and informal observation of tasks, assessment of data and development of plan of care and goals.   Treatment time included skilled education and facilitation of tasks to increase safety and independence with functional mobility for improved independence and quality of life. Assessment: Body structures, Functions, Activity limitations: Decreased functional mobility , Decreased balance, Decreased endurance, Decreased strength  Assessment: Pt has extreme weakness, decreased activity status , derceased functional mobility, balance . Pt is very unsafe with mobility. Pt needs continued therapy to improve safety with gait and transfers  Prognosis: Good    REQUIRES PT FOLLOW UP: Yes    Discharge Recommendations:  Discharge Recommendations: Patient would benefit from continued therapy after discharge, Continue to assess pending progress, Subacute/Skilled Nursing Facility    Patient Education:  PT Education: Goals, PT Role, Plan of Care, Home Exercise Program, Functional Mobility Training, Gait Training    Equipment Recommendations: Other: cont to assess    Plan:  Times per week: 5 X GM  Specific instructions for Next Treatment: Pts very weak, trial bariatric walker ,may need to follow with W/C if get out of room  Current Treatment Recommendations: Strengthening, Transfer Training, Endurance Training, Balance Training, Functional Mobility Training, Gait Training    Goals:  Patient goals : Go home  Short term goals  Time Frame for Short term goals: by discharge  Short term goal 1: supine to sit and return with Mod I to get in and out of bed  Short term goal 2: sit to stand with Mod I to get on andoff various surfaces  Short term goal 3: Pt will ambulate with /without AD 50 feet or more with S to walk household distances  Long term goals  Time Frame for Long term goals : NA due to short ELOS    Following session, patient left in safe position with all fall risk precautions in place.

## 2020-12-13 NOTE — PROGRESS NOTES
Pt requested that no family members be called to update them on her progress. \"All calls should be sent to me\". Will continue to monitor.

## 2020-12-13 NOTE — PROGRESS NOTES
Hospitalist Progress Note    Patient:  Lalita Oas      Unit/Bed:6A-06/006-A    YOB: 1955    MRN: [id number]       Acct: [de-identified]     PCP: PATRICIO Willard CNP    Date of Admission: 12/12/2020    Active Hospital Problems    Diagnosis Date Noted    COVID-19 [U07.1] 12/12/2020       Assessment and Plan:     1. Acute Hypoxic Resp. Failure due to Pneumonia 2/2 Covid-19: patient positive for Covid-19 with chest xray demonstrating bilateral perihilar and basilar consolidations. Symptoms: diarrhea, b/l lower extremity weakness, non-productive cough. Pulse Ox 80-90%, on 2L NC.   - Decadron (Day 2) and Remdisivir  - Vit D, C, Zinc and Lovenox BID    - droplet precautions . - cont to wean O2 as tolerated      2. Hyponatremia - sodium level 125 but when corrected for glucose 350->sodium 129. Patient has no signs of confusion, is not lethargic. This could be due to SIADH vs Hyperosmolar hyponatremia vs hypovolemia in the setting of diarrhea. - urine osmolality->pending  - urine sodium->pending   - check sodium level q8h x6, goal is to keep sodium level >136   - continue to monitor mental status       3. Type 2 Diabetes mellitis II with Hyperglycemia:  - last Hemoglobin 15.4 (11/4/20)  - increase Lantus from 10U to 20U   - Humalog Sliding Scale   - Diabetic Diet      4. CAD s/p angioplasty of the RT SFA (2019) - denies chest pain/SOB. Continue Plavix 75 mg daily, ASA 81 mg daily      5. HTN - controlled. Continue atenolol 100 mg daily, Amlodipine 5 mg daily      6. HLD - controlled. Continue pravastatin 81 mg.       7. Chronic lower extremity edema - Will hold Lasix 20 mg due to diarrhea and possible electrolyte abnormality          Chief Complaint: Bilateral lower extremity weakness, cough, diarrhea     Hospital Course: Angle HARRIS  Simon Li is a 77year old female who presents to the Emergency department with complain of bilateral lower extremity weakness of 5 day duration and generalized fatigue. She has PMHx significant for CAD s/p angioplasty with preethi of the RT SFA (2019), PAD with claudication, History of CVA/TIA, Cryptogenic stroke (2010), HTN, HLD, Spinal stenosis, Spondylolisthesis, SOB on exertion, chronic lower extremity edema, Morbid obesity. Patient has been experiencing non-productive cough that started 5 days ago without associated chest pain or shortness of breath at rest. She has been having non-bloody, watery diarrhea without any mucous,  for the same period as well. She denies having fevers or chills. Denies abdominal pain, weakness in the upper extremity, lightheadedness or dizziness. She states that she is not able to walk or stand on her legs.      In the emergency room, her initial vital signs are: respiratory rate 29, heart rate 93, blood pressure 150/89, SpO2 96% on RA. Lab results indicate: Sodium 125, potassium 4.5, chloride 92, Bicarb 21, glucose 350, BUN 9, creatinine 0.7, calcium 8.6, total protein 6.5, AST 24, ALT 20, total bili 0.5, ALK 47, lactic acid 1.3, troponin <0.01, . COVID-19->positive. Chest xray reveals streaky bilateral perihilar and basilar consolidations. Subjective: no acute events overnight. Pt not having any diarrhea, tolerating PO intake. Pt still coughing, having sob with exertion. No chest pain.         Medications:  Reviewed    Infusion Medications    dextrose       Scheduled Medications    insulin glargine  20 Units Subcutaneous QAM    dexamethasone  6 mg Oral Daily    zinc sulfate  50 mg Oral Daily    Vitamin D  1,000 Units Oral Daily    amLODIPine  5 mg Oral Daily    vitamin C  250 mg Oral Daily    aspirin  81 mg Oral Daily    atenolol  100 mg Oral Daily    clopidogrel  75 mg Oral Daily    pravastatin  80 mg Oral Daily    sodium chloride flush  10 mL Intravenous 2 times per day    enoxaparin  40 mg Subcutaneous BID    phosphorus replacement protocol   Other RX Placeholder    potassium replacement protocol   Other RX Placeholder    magnesium replacement protocol   Other RX Placeholder    insulin lispro  0-18 Units Subcutaneous TID     insulin lispro  0-9 Units Subcutaneous Nightly     PRN Meds: cyclobenzaprine, sodium chloride flush, polyethylene glycol, acetaminophen **OR** acetaminophen, ondansetron **OR** ondansetron, glucose, dextrose, glucagon (rDNA), dextrose      Intake/Output Summary (Last 24 hours) at 12/13/2020 1156  Last data filed at 12/13/2020 6811  Gross per 24 hour   Intake 2051.17 ml   Output --   Net 2051.17 ml       Diet:  DIET CARB CONTROL; Carb Control: 3 carb choices (45 gms)/meal    Exam:  BP (!) 120/94   Pulse 88   Temp 98.1 °F (36.7 °C) (Oral)   Resp 16   Ht 5' 8\" (1.727 m)   Wt (!) 348 lb 11.2 oz (158.2 kg)   SpO2 94%   BMI 53.02 kg/m²     General appearance: No apparent distress, appears stated age and cooperative. Obese  HEENT: Normal cephalic, atraumatic without obvious deformity. Pupils equal, round, and reactive to light. Extra ocular muscles intact. Conjunctivae/corneas clear. Neck: Supple, with full range of motion. No jugular venous distention. Trachea midline. Respiratory:  Rales appreciated   Cardiovascular: Regular rate and rhythm with normal S1/S2 without murmurs, rubs or gallops. Abdomen: Soft, non-tender, non-distended with normal bowel sounds. Musculoskeletal:  No clubbing, bilateral +1 edema appreciated   Skin: Skin color, texture, turgor normal.  No rashes or lesions. Neurologic:  Neurovascularly intact without any focal sensory/motor deficits. Cranial nerves: II-XII intact, grossly non-focal.  Psychiatric: Alert and oriented, thought content appropriate, normal insight  Capillary Refill: Brisk,< 3 seconds   Peripheral Pulses: +2 palpable, equal bilaterally       Labs:   No results for input(s): WBC, HGB, HCT, PLT in the last 72 hours.   Recent Labs     12/12/20  1039 12/12/20  1039 12/12/20  2358   *   < > 127*   PHOS 2.5  --   --     < > = values in this interval not displayed. Recent Labs     12/12/20  1039   AST 26   ALT 22   BILIDIR <0.2   BILITOT 0.5   ALKPHOS 50     No results for input(s): INR in the last 72 hours. Recent Labs     12/12/20  1039   CKTOTAL 110       Urinalysis:      Lab Results   Component Value Date    NITRU NEGATIVE 06/07/2018    WBCUA 0-2 06/07/2018    BACTERIA MODERATE 06/07/2018    RBCUA 3-5 06/07/2018    BLOODU TRACE 06/07/2018    GLUCOSEU NEGATIVE 06/07/2018       Radiology:   All imaging reviewed     Diet: DIET CARB CONTROL; Carb Control: 3 carb choices (45 gms)/meal      Code Status: Full Code            Electronically signed by Ely Drake MD on 12/13/2020 at 11:56 AM

## 2020-12-13 NOTE — PROGRESS NOTES
Jose Newman 58  INPATIENT OCCUPATIONAL THERAPY  STRZ 6A CAPACITY EBOLA  EVALUATION    Time In: 1575  Time Out: 0110  Timed Code Treatment Minutes: 25 Minutes  Minutes: 41          Date: 2020  Patient Name: Quoc Anderson,   Gender: female      MRN: [id number]  : 1955  (77 y.o.)  Referring Practitioner: Lawrence Anglin MD  Diagnosis: COVID-19  Additional Pertinent Hx: Angle Acevedo is a 77year old female who presents to the Emergency department with complain of bilateral lower extremity weakness of 5 day duration and generalized fatigue. She has PMHx significant for CAD s/p angioplasty with baloon of the RT SFA (), PAD with claudication, History of CVA/TIA, Cryptogenic stroke (), HTN, HLD, Spinal stenosis, Spondylolisthesis, SOB on exertion, chronic lower extremity edema, Morbid obesity. Patient has been experiencing non-productive cough that started 5 days ago without associated chest pain or shortness of breath at rest. She has been having non-bloody, watery diarrhea without any mucous,  for the same period as well. She denies having fevers or chills. Denies abdominal pain, weakness in the upper extremity, lightheadedness or dizziness. She states that she is not able to walk or stand on her legs. Restrictions/Precautions:  Restrictions/Precautions: Fall Risk, General Precautions    Subjective  Chart Reviewed: Yes, Orders  Patient assessed for rehabilitation services?: Yes  Family / Caregiver Present: No    Subjective: Pt sitting on EOB upon arrival. Pt agreeable to OT, stating she wanted to complete ADL routine. Patient on 1 L O2 via Nasal Canula  upon arrival to room. Patient O2 sats at 98 %. Upon activity patient dropping O2 at 93 %. Pt requiring 1 rest break(s) to recover.        Pain:  Pain Assessment  Patient Currently in Pain: Denies    Social/Functional History:  Lives With: Spouse  Type of Home: House  Home Layout: One level  Home Access: Ramped entrance Bathroom Shower/Tub: Shower chair with back, Tub/Shower unit  Bathroom Toilet: Handicap height       ADL Assistance: Independent  Homemaking Assistance: Needs assistance  Homemaking Responsibilities: Yes  Ambulation Assistance: Independent  Transfer Assistance: Independent               Cognition/Orientation:  Overall Orientation Status: Within Functional Limits  Cognition Comment: Decreased safety awareness    ADL's:  Grooming: Contact guard assistance(for washing hands at sink)  UE Bathing: Supervision(seated on EOB)  LE Bathing: (Pt did not want to complete on this date.)  UE Dressing: Moderate assistance(for gown mgmt)  Toileting: Stand by assistance       Functional Mobility:  Bed mobility  Scooting: Stand by assistance    Functional Mobility  Functional - Mobility Device: (HHA)  Activity: To/from bathroom  Assist Level: Minimal assistance  Functional Mobility Comments: Pt very unsteady, required less assist as mobility progressed. Pt educated on possible use of RW. Pt agreeable to trying it. Balance:  Balance  Sitting Balance: Modified independent   Standing Balance: Contact guard assistance  Standing Balance  Time: 2 min  Activity: sinkside grooming  Comment: Pt with increased weakness during standing. Transfers:  Sit to stand: Minimal assistance(from EOB)  Stand to sit: Minimal assistance  Toilet Transfers  Toilet - Technique: Ambulating  Equipment Used: Grab bars  Toilet Transfer: Minimal assistance    Upper Extremity Assessment:   LUE AROM : WFL  RUE AROM : WFL    LUE Strength  Gross LUE Strength: Exceptions to [facility]  LUE Strength Comment: general deconditioning  RUE Strength  Gross RUE Strength: Exceptions to [facility]  RUE Strength Comment: general deconditioning    Sensation  Overall Sensation Status: WFL  RUE Tone: Normotonic  LUE Tone: Normotonic       Activity Tolerance:    Pt limited by being cold and weakness in legs    Assessment:  Assessment: Pt presented with the listed deficits.  Pt with decreased activity tolerance and strength, leading to an overall decrease in ADL indep. Pt would benefit from continued OT to further increase strength and endurance with ADLs and IADLs. Performance deficits / Impairments: Decreased functional mobility , Decreased endurance, Decreased ADL status, Decreased balance, Decreased strength, Decreased safe awareness, Decreased high-level IADLs  REQUIRES OT FOLLOW UP: Yes  Decision Making: Medium Complexity  Safety Devices in place: Yes    Treatment Initiated: Treatment and education initiated within context of evaluation. Evaluation time included review of current medical information, gathering information related to past medical, social and functional history, completion of standardized testing, formal and informal observation of tasks, assessment of data and development of plan of care and goals. Treatment time included skilled education and facilitation of tasks to increase safety and independence with ADL's for improved functional independence and quality of life.     Discharge Recommendations:  Continue to assess pending progress    Patient Education:  OT Education: OT Role, Plan of Care, ADL Adaptive Strategies, Transfer Training, Energy Conservation    Equipment Recommendations:  Equipment Needed: Yes(possibly RW)    Plan:  Times per week: 5x  Times per day: Daily  Current Treatment Recommendations: Balance Training, Functional Mobility Training, Endurance Training, Patient/Caregiver Education & Training, Home Management Training, Self-Care / ADL    Goals:  Patient goals : get stronger and go home  Short term goals  Time Frame for Short term goals: by d/c  Short term goal 1: Pt will complete functional mobiltiy short HH distances with SBA and LRAE for increased indep wtih ADLs  Short term goal 2: Pt will tolerate dynamic standing > 3 min with SBA and min VC for safety to increase indep with grooming  Short term goal 3: Pt will complete various t/fs with SBA and no safety cues to increase indep with toileting  Short term goal 4: Pt will complete BUE strengthening HEP to increase indep with ADLs  Long term goals  Time Frame for Long term goals : no LTG d/t short ELOS  See long-term goal time frame for expected duration of plan of care. If no long-term goals established, a short length of stay is anticipated. Following session, patient left in safe position with all fall risk precautions in place.

## 2020-12-13 NOTE — PROGRESS NOTES
Remdesivir Initiation Note    This patient meets criteria for initiation of remdesivir based on the following:  Proven COVID-19  Mild to moderate disease (SpO2 ? 94% on RA or requiring supplemental O2)  Acceptable hepatic function (ALT within 5 times ULN)    Exclusion Criteria:  Severe disease requiring invasive or non-invasive mechanical ventilation (includes HFNC & BiPAP)  Could consider use in patients requiring high flow if early on in the disease course (based on symptom duration)  Use of more than 1 vasopressor prior to remdesivir initiation  Already improving on supportive treatment and/or impending discharge  Patients in whom the clinical team think death is in the immediate short-term where remdesivir is unlikely to change the clinical outcome     Liver function tests will be monitored daily while on remdesivir.     Becka Pierce, PharmD, BCPS  12/13/2020  12:12 PM

## 2020-12-14 ENCOUNTER — APPOINTMENT (OUTPATIENT)
Dept: CT IMAGING | Age: 65
DRG: 177 | End: 2020-12-14
Payer: COMMERCIAL

## 2020-12-14 LAB
ALBUMIN SERPL-MCNC: 2.8 G/DL (ref 3.5–5.1)
ALP BLD-CCNC: 42 U/L (ref 38–126)
ALT SERPL-CCNC: 17 U/L (ref 11–66)
ANION GAP SERPL CALCULATED.3IONS-SCNC: 14 MEQ/L (ref 8–16)
AST SERPL-CCNC: 20 U/L (ref 5–40)
BILIRUB SERPL-MCNC: 0.5 MG/DL (ref 0.3–1.2)
BILIRUBIN DIRECT: < 0.2 MG/DL (ref 0–0.3)
BUN BLDV-MCNC: 15 MG/DL (ref 7–22)
CALCIUM SERPL-MCNC: 8.7 MG/DL (ref 8.5–10.5)
CHLORIDE BLD-SCNC: 98 MEQ/L (ref 98–111)
CO2: 20 MEQ/L (ref 23–33)
CREAT SERPL-MCNC: 0.7 MG/DL (ref 0.4–1.2)
D-DIMER QUANTITATIVE: 407 NG/ML FEU (ref 0–500)
FIBRINOGEN: 504 MG/100ML (ref 155–475)
GFR SERPL CREATININE-BSD FRML MDRD: > 90 ML/MIN/1.73M2
GLUCOSE BLD-MCNC: 307 MG/DL (ref 70–108)
GLUCOSE BLD-MCNC: 329 MG/DL (ref 70–108)
GLUCOSE BLD-MCNC: 372 MG/DL (ref 70–108)
GLUCOSE BLD-MCNC: 382 MG/DL (ref 70–108)
GLUCOSE BLD-MCNC: 387 MG/DL (ref 70–108)
MAGNESIUM: 2.2 MG/DL (ref 1.6–2.4)
POTASSIUM SERPL-SCNC: 4.1 MEQ/L (ref 3.5–5.2)
SODIUM BLD-SCNC: 132 MEQ/L (ref 135–145)
TOTAL PROTEIN: 6.2 G/DL (ref 6.1–8)

## 2020-12-14 PROCEDURE — 6370000000 HC RX 637 (ALT 250 FOR IP): Performed by: STUDENT IN AN ORGANIZED HEALTH CARE EDUCATION/TRAINING PROGRAM

## 2020-12-14 PROCEDURE — 2580000003 HC RX 258: Performed by: STUDENT IN AN ORGANIZED HEALTH CARE EDUCATION/TRAINING PROGRAM

## 2020-12-14 PROCEDURE — 99233 SBSQ HOSP IP/OBS HIGH 50: CPT | Performed by: INTERNAL MEDICINE

## 2020-12-14 PROCEDURE — 2500000003 HC RX 250 WO HCPCS: Performed by: INTERNAL MEDICINE

## 2020-12-14 PROCEDURE — 97530 THERAPEUTIC ACTIVITIES: CPT

## 2020-12-14 PROCEDURE — 97116 GAIT TRAINING THERAPY: CPT

## 2020-12-14 PROCEDURE — 85379 FIBRIN DEGRADATION QUANT: CPT

## 2020-12-14 PROCEDURE — 71250 CT THORAX DX C-: CPT

## 2020-12-14 PROCEDURE — 2060000000 HC ICU INTERMEDIATE R&B

## 2020-12-14 PROCEDURE — 82248 BILIRUBIN DIRECT: CPT

## 2020-12-14 PROCEDURE — 2580000003 HC RX 258: Performed by: INTERNAL MEDICINE

## 2020-12-14 PROCEDURE — 6360000002 HC RX W HCPCS: Performed by: STUDENT IN AN ORGANIZED HEALTH CARE EDUCATION/TRAINING PROGRAM

## 2020-12-14 PROCEDURE — 82948 REAGENT STRIP/BLOOD GLUCOSE: CPT

## 2020-12-14 PROCEDURE — 36415 COLL VENOUS BLD VENIPUNCTURE: CPT

## 2020-12-14 PROCEDURE — 6360000002 HC RX W HCPCS: Performed by: INTERNAL MEDICINE

## 2020-12-14 PROCEDURE — 85385 FIBRINOGEN ANTIGEN: CPT

## 2020-12-14 PROCEDURE — 83735 ASSAY OF MAGNESIUM: CPT

## 2020-12-14 PROCEDURE — 6370000000 HC RX 637 (ALT 250 FOR IP): Performed by: INTERNAL MEDICINE

## 2020-12-14 PROCEDURE — 97110 THERAPEUTIC EXERCISES: CPT

## 2020-12-14 PROCEDURE — 80053 COMPREHEN METABOLIC PANEL: CPT

## 2020-12-14 RX ORDER — INSULIN GLARGINE 100 [IU]/ML
35 INJECTION, SOLUTION SUBCUTANEOUS EVERY MORNING
Status: DISCONTINUED | OUTPATIENT
Start: 2020-12-15 | End: 2020-12-15

## 2020-12-14 RX ORDER — INSULIN GLARGINE 100 [IU]/ML
15 INJECTION, SOLUTION SUBCUTANEOUS ONCE
Status: COMPLETED | OUTPATIENT
Start: 2020-12-14 | End: 2020-12-14

## 2020-12-14 RX ADMIN — ENOXAPARIN SODIUM 40 MG: 40 INJECTION SUBCUTANEOUS at 08:32

## 2020-12-14 RX ADMIN — INSULIN GLARGINE 15 UNITS: 100 INJECTION, SOLUTION SUBCUTANEOUS at 11:27

## 2020-12-14 RX ADMIN — REMDESIVIR 100 MG: 100 INJECTION, POWDER, LYOPHILIZED, FOR SOLUTION INTRAVENOUS at 15:44

## 2020-12-14 RX ADMIN — POLYETHYLENE GLYCOL 3350 17 G: 17 POWDER, FOR SOLUTION ORAL at 20:14

## 2020-12-14 RX ADMIN — SODIUM CHLORIDE, PRESERVATIVE FREE 10 ML: 5 INJECTION INTRAVENOUS at 08:38

## 2020-12-14 RX ADMIN — AMLODIPINE BESYLATE 5 MG: 5 TABLET ORAL at 08:31

## 2020-12-14 RX ADMIN — Medication 1000 UNITS: at 08:31

## 2020-12-14 RX ADMIN — OXYCODONE HYDROCHLORIDE AND ACETAMINOPHEN 250 MG: 500 TABLET ORAL at 08:31

## 2020-12-14 RX ADMIN — INSULIN GLARGINE 20 UNITS: 100 INJECTION, SOLUTION SUBCUTANEOUS at 08:52

## 2020-12-14 RX ADMIN — INSULIN LISPRO 15 UNITS: 100 INJECTION, SOLUTION INTRAVENOUS; SUBCUTANEOUS at 12:36

## 2020-12-14 RX ADMIN — Medication 50 MG: at 08:31

## 2020-12-14 RX ADMIN — INSULIN LISPRO 12 UNITS: 100 INJECTION, SOLUTION INTRAVENOUS; SUBCUTANEOUS at 08:51

## 2020-12-14 RX ADMIN — ATENOLOL 100 MG: 100 TABLET ORAL at 08:31

## 2020-12-14 RX ADMIN — INSULIN LISPRO 12 UNITS: 100 INJECTION, SOLUTION INTRAVENOUS; SUBCUTANEOUS at 18:37

## 2020-12-14 RX ADMIN — PRAVASTATIN SODIUM 80 MG: 80 TABLET ORAL at 20:26

## 2020-12-14 RX ADMIN — ENOXAPARIN SODIUM 40 MG: 40 INJECTION SUBCUTANEOUS at 20:27

## 2020-12-14 RX ADMIN — CLOPIDOGREL BISULFATE 75 MG: 75 TABLET ORAL at 08:31

## 2020-12-14 RX ADMIN — ASPIRIN 81 MG: 81 TABLET, CHEWABLE ORAL at 08:31

## 2020-12-14 RX ADMIN — DEXAMETHASONE 6 MG: 4 TABLET ORAL at 08:31

## 2020-12-14 NOTE — PROGRESS NOTES
TCU consult received. Chart reviewed. Pt is a precert. Will follow post Remdesivir for skilled needs.

## 2020-12-14 NOTE — PROGRESS NOTES
Hospitalist Progress Note    Patient:  Annette Dawson      Unit/Bed:6A-06/006-A    YOB: 1955    MRN: [id number]       Acct: [de-identified]     PCP: PATRICIO Parnell CNP    Date of Admission: 12/12/2020    Active Hospital Problems    Diagnosis Date Noted    COVID-19 [U07.1] 12/12/2020       Assessment and Plan:    1. Acute Hypoxic Resp. Failure due to Pneumonia 2/2 Covid-19: patient positive for Covid-19 with chest xray demonstrating bilateral perihilar and basilar consolidations. Symptoms: diarrhea, b/l lower extremity weakness, non-productive cough. Pulse Ox 80-90%, on 2L NC. Cont Decadron (Day 3) and Remdisivir (Day 2). Cont Vit D, C, Zinc and Lovenox BID.    --12/14: O2 dropped to 87% on 2L NC and RR increased to 31, increased to 5L NC. Pending CT Chest wo contrast. Cont Decadron (Day 3) and Remdisivir (Day 2). Cont Vit D, C, Zinc and Lovenox BID.           2. Hyponatremia, likely pseudo due to hyperglycemia - sodium level 125 but when corrected for glucose 129. Patient has no signs of confusion, is not lethargic. Improving with controlling blood sugars. Cont to trend daily.        3. Type 2 Diabetes mellitis II with Hyperglycemia: last Hemoglobin 15.4 (11/4/20). Increase Lantus from 10U to 20U . Humalog Sliding Scale. Diabetic Diet     --12/14: Blood sugars still elevated today at 372, will increase Lantus from 20U to 35U today. Cont Humalog sliding scale. Diabetic Diet.          4. CAD s/p angioplasty of the RT SFA (2019) - denies chest pain/SOB. Continue Plavix 75 mg daily, ASA 81 mg daily      5. HTN - controlled. Continue atenolol 100 mg daily, Amlodipine 5 mg daily      6. HLD - controlled. Continue pravastatin 81 mg.       7. Chronic lower extremity edema - Will hold Lasix 20 mg due to diarrhea and possible electrolyte abnormality     8. B/L LE Weakness: PT/OT recommending ECF.  Consult SW for ECF placement.          Chief Complaint: Bilateral lower extremity weakness, cough, 100 mg Oral Daily    clopidogrel  75 mg Oral Daily    pravastatin  80 mg Oral Daily    sodium chloride flush  10 mL Intravenous 2 times per day    enoxaparin  40 mg Subcutaneous BID    phosphorus replacement protocol   Other RX Placeholder    potassium replacement protocol   Other RX Placeholder    magnesium replacement protocol   Other RX Placeholder    insulin lispro  0-18 Units Subcutaneous TID WC    insulin lispro  0-9 Units Subcutaneous Nightly     PRN Meds: sodium chloride, cyclobenzaprine, sodium chloride flush, polyethylene glycol, acetaminophen **OR** acetaminophen, ondansetron **OR** ondansetron, glucose, dextrose, glucagon (rDNA), dextrose      Intake/Output Summary (Last 24 hours) at 12/14/2020 1227  Last data filed at 12/14/2020 0456  Gross per 24 hour   Intake 120 ml   Output --   Net 120 ml       Diet:  DIET CARB CONTROL; Carb Control: 3 carb choices (45 gms)/meal    Exam:  /64   Pulse 78   Temp 98.5 °F (36.9 °C) (Oral)   Resp 20   Ht 5' 8\" (1.727 m)   Wt (!) 348 lb 11.2 oz (158.2 kg)   SpO2 94%   BMI 53.02 kg/m²     General appearance: No apparent distress, appears stated age and cooperative. Obese  HEENT: Normal cephalic, atraumatic without obvious deformity. Pupils equal, round, and reactive to light. Extra ocular muscles intact. Conjunctivae/corneas clear. Neck: Supple, with full range of motion. No jugular venous distention. Trachea midline. Respiratory:  Rales appreciated   Cardiovascular: Regular rate and rhythm with normal S1/S2 without murmurs, rubs or gallops. Abdomen: Soft, non-tender, non-distended with normal bowel sounds. Musculoskeletal:  No clubbing, bilateral +1 edema appreciated   Skin: Skin color, texture, turgor normal.  No rashes or lesions. Neurologic:  Neurovascularly intact without any focal sensory/motor deficits.  Cranial nerves: II-XII intact, grossly non-focal.  Psychiatric: Alert and oriented, thought content appropriate, normal insight  Capillary Refill: Brisk,< 3 seconds   Peripheral Pulses: +2 palpable, equal bilaterally       Labs:   Recent Labs     12/13/20  1201   WBC 5.7   HGB 17.2*   HCT 53.9*        Recent Labs     12/12/20  1039 12/12/20  1039 12/13/20  1201 12/13/20  2310   *   < > 132* 131*   K  --   --  4.4  4.4  --    CL  --   --  98  --    CO2  --   --  25  --    BUN  --   --  13  --    CREATININE  --   --  0.8  --    CALCIUM  --   --  9.2  --    PHOS 2.5  --   --   --     < > = values in this interval not displayed. Recent Labs     12/12/20  1039 12/13/20  1201   AST 26 24   ALT 22 20   BILIDIR <0.2  --    BILITOT 0.5 0.5   ALKPHOS 50 47     No results for input(s): INR in the last 72 hours. Recent Labs     12/12/20  1039   CKTOTAL 110       Urinalysis:      Lab Results   Component Value Date    NITRU NEGATIVE 12/12/2020    WBCUA 0-2 12/12/2020    BACTERIA NONE SEEN 12/12/2020    RBCUA NONE SEEN 12/12/2020    BLOODU NEGATIVE 12/12/2020    GLUCOSEU >= 1000 12/12/2020       Radiology:   All imaging reviewed     Diet: DIET CARB CONTROL; Carb Control: 3 carb choices (45 gms)/meal      Code Status: Full Code            Electronically signed by Vincent Chirinos MD on 12/14/2020 at 12:27 PM

## 2020-12-14 NOTE — PROGRESS NOTES
Physician Progress Note      PATIENT:               Lucas Stewart  CSN #:                  [id number]  :                       1955  ADMIT DATE:       2020 4:32 AM  DISCH DATE:  RESPONDING  PROVIDER #:        Pollo Bonds MD          QUERY TEXT:    Attending,    Pt admitted with COVID-19 pneumonia and noted to have acute hypoxic   respiratory failure, T 101.1, R 20-30s, WBC 5.1, CRP 7.20, lactic wnl. If   possible, please respond below and document in the progress notes and   discharge summary if you are evaluating and/or treating: The medical record reflects the following:  Risk Factors: COVID-19 pneumonia, DM 2, advanced age  Clinical Indicators: acute hypoxic respiratory failure, T 101.1, R 20-30s, WBC   5.1, CRP 7.20, lactic wnl  Treatment: IV Remdesivir, Decadron, Lovenox, Vit C & D, Zinc, IVF, Tylenol,   O2, labs, imaging, isolation    Thank you! Gilberto Cui, RN, BSN, RHIT, CCDS, [facility]  RN Clinical   [phone]  Options provided:  -- Sepsis due to COVID-19, POA  -- COVID-19 without sepsis  -- Other - I will add my own diagnosis  -- Disagree - Not applicable / Not valid  -- Disagree - Clinically unable to determine / Unknown  -- Refer to Clinical Documentation Reviewer    PROVIDER RESPONSE TEXT:    This patient has COVID-19 without sepsis.     Query created by: Maribel Cagle on 2020 10:21 AM      Electronically signed by:  Pollo Bonds MD 2020 1:19 PM

## 2020-12-14 NOTE — PROGRESS NOTES
[facility]  INPATIENT PHYSICAL THERAPY  DAILY NOTE  STRZ 6A CAPACITY EBOLA - 6A--A      Time In: 0756  Time Out: 1248  Timed Code Treatment Minutes: 40 Minutes  Minutes: 40          Date: 2020  Patient Name: Quoc Anderson,  Gender:  female        MRN: [id number]  : 1955  (77 y.o.)     Referring Practitioner: Dr Jarvis Elizalde  Diagnosis: COVID-19  Additional Pertinent Hx: Pt amditted 12-12 from home with weakness, cough, SOB     Prior Level of Function:  Lives With: Spouse  Type of Home: House  Home Layout: One level  Home Access: Ramped entrance   Bathroom Shower/Tub: Shower chair with back, Tub/Shower unit  Bathroom Toilet: Handicap height    ADL Assistance: [facility] [address] [facility] [facility] [address]: Needs assistance  Homemaking Responsibilities: Yes  Ambulation Assistance: Independent  Transfer Assistance: Independent  Active :  Yes  Additional Comments: Pt denies use of AD at home    Restrictions/Precautions:  Restrictions/Precautions: Fall Risk, General Precautions  Position Activity Restriction  Other position/activity restrictions: covid+--  pt on 4L O2       SUBJECTIVE: nursing ok'd therapy, pt in bed she wanted to make sure I knew that her right side has always been weak and was told she will not get any stronger, discussed that while she is here in the hosp we want to make sure she is able to move around and keep what strength she has in order to go home safely, pt was on 4L O2 and her O2 sats did drop with gait to 84-85 took 1-2 min to recover     PAIN: 0/10:       OBJECTIVE:  Bed Mobility:  Supine to Sit: with HOB elevated and SBA, pt reported that she sleeps in a recliner and not in bed       Transfers:  Sit to Stand: Contact Guard Assistance, from bed and min assist from low toilet x 2 trials   Stand to Sit:Contact Guard Assistance    Ambulation:  Contact Guard Assistance  Distance: 12x2  Surface: Level Tile  Device:No Device  Gait Deviations:  Slow bill with a waddle type pattern with decreased step length and heel strike at brandon LEs, pt needed assist for her O2 tubing and she demonstrated poor awareness of her tubing placement, discussed using a walker she reported that she does have a walker and cane at home     Balance:  pt standing in bathroom to complete laureano care adn toileting task with CGA for balance she was able to reach out of base and did tend to hold onto wall or sink for support pt reported that at home she does have to sit frequently and she stood for nearly 3 min each time     Exercise:  Patient was guided in 1 set(s) 10 reps of exercise to brandon  lower extremities. Seated marches, Seated heel/toe raises and Long arc quads. Exercises were completed for increased independence with functional mobility. Functional Outcome Measures: Completed  AM-PAC Inpatient Mobility Raw Score : 16  AM-PAC Inpatient T-Scale Score : 40.78    ASSESSMENT:  Assessment: Patient progressing toward established goals. and however she requried 4L o2 and did drop with activity, pt demonstrated generalized weakness and decreased endurance and would benefit from cont skilled therapy   Activity Tolerance:  Patient tolerance of  treatment: fair. Equipment Recommendations: Other: cont to assess  Discharge Recommendations:    Patient would benefit from continued therapy after discharge, Continue to assess pending progress, home with spouse and 24 hour assist vsSubacute/Skilled Nursing Facility    Plan: Times per week: 5 X GM  Specific instructions for Next Treatment: Pts very weak, trial bariatric walker ,may need to follow with W/C if get out of room  Current Treatment Recommendations: Strengthening, Transfer Training, Endurance Training, Balance Training, Functional Mobility Training, Gait Training    Patient Education  Patient Education: Plan of Care, discussed importance of mobility and safety concerns with home going     Goals:  Patient goals : Go home  Short term goals  Time Frame for Short term goals: by discharge  Short term goal 1: supine to sit and return with Mod I to get in and out of bed  Short term goal 2: sit to stand with Mod I to get on andoff various surfaces  Short term goal 3: Pt will ambulate with /without AD 50 feet or more with S to walk household distances  Long term goals  Time Frame for Long term goals : NA due to short ELOS    Following session, patient left in safe position with all fall risk precautions in place.

## 2020-12-14 NOTE — CARE COORDINATION
DISCHARGE/PLANNING EVALUATION  12/14/20, 11:01 AM EST    Reason for Referral: Discharge plan  Mental Status: Patient is alert and oriented  Decision Making: Patient makes own decisions  Family/Social/Home Environment: Spoke with patient, assessment completed. Patient lives with spouse. She stated [facility] She did the household chores, cooking and driving. Current Services including food security, transportation and housekeeping: see above  Current Equipment: none  Payment Source: Medicare and [employer]  Concerns or Barriers to Discharge:  Patient is agreeable to TCU but refusing ECF. Post acute provider list with quality measures, geographic area and applicable managed care information provided. Questions regarding selection process answered:see above    Teach Back Method used with patient regarding care plan and discharge planning. Patient verbalize understanding of the plan of care and contribute to goal setting. Patient goals, treatment preferences and discharge plan: Patient plans TCU vs Home with spouse and new HH.     Electronically signed by KIMBERLY Gallegos on 12/14/2020 at 11:01 AM

## 2020-12-15 ENCOUNTER — APPOINTMENT (OUTPATIENT)
Dept: GENERAL RADIOLOGY | Age: 65
DRG: 177 | End: 2020-12-15
Payer: COMMERCIAL

## 2020-12-15 LAB
ALBUMIN SERPL-MCNC: 3.1 G/DL (ref 3.5–5.1)
ALP BLD-CCNC: 49 U/L (ref 38–126)
ALT SERPL-CCNC: 16 U/L (ref 11–66)
ANION GAP SERPL CALCULATED.3IONS-SCNC: 12 MEQ/L (ref 8–16)
AST SERPL-CCNC: 15 U/L (ref 5–40)
BILIRUB SERPL-MCNC: 0.5 MG/DL (ref 0.3–1.2)
BILIRUBIN DIRECT: < 0.2 MG/DL (ref 0–0.3)
BUN BLDV-MCNC: 18 MG/DL (ref 7–22)
CALCIUM SERPL-MCNC: 9.2 MG/DL (ref 8.5–10.5)
CHLORIDE BLD-SCNC: 101 MEQ/L (ref 98–111)
CO2: 24 MEQ/L (ref 23–33)
CREAT SERPL-MCNC: 0.8 MG/DL (ref 0.4–1.2)
D-DIMER QUANTITATIVE: 317 NG/ML FEU (ref 0–500)
FIBRINOGEN: 457 MG/100ML (ref 155–475)
GFR SERPL CREATININE-BSD FRML MDRD: 86 ML/MIN/1.73M2
GLUCOSE BLD-MCNC: 307 MG/DL (ref 70–108)
GLUCOSE BLD-MCNC: 318 MG/DL (ref 70–108)
GLUCOSE BLD-MCNC: 349 MG/DL (ref 70–108)
GLUCOSE BLD-MCNC: 357 MG/DL (ref 70–108)
GLUCOSE BLD-MCNC: 368 MG/DL (ref 70–108)
MAGNESIUM: 2.2 MG/DL (ref 1.6–2.4)
POTASSIUM SERPL-SCNC: 4.7 MEQ/L (ref 3.5–5.2)
SODIUM BLD-SCNC: 137 MEQ/L (ref 135–145)
TOTAL PROTEIN: 6.4 G/DL (ref 6.1–8)

## 2020-12-15 PROCEDURE — 83735 ASSAY OF MAGNESIUM: CPT

## 2020-12-15 PROCEDURE — 2500000003 HC RX 250 WO HCPCS: Performed by: INTERNAL MEDICINE

## 2020-12-15 PROCEDURE — 36415 COLL VENOUS BLD VENIPUNCTURE: CPT

## 2020-12-15 PROCEDURE — 82948 REAGENT STRIP/BLOOD GLUCOSE: CPT

## 2020-12-15 PROCEDURE — 2580000003 HC RX 258: Performed by: STUDENT IN AN ORGANIZED HEALTH CARE EDUCATION/TRAINING PROGRAM

## 2020-12-15 PROCEDURE — 85385 FIBRINOGEN ANTIGEN: CPT

## 2020-12-15 PROCEDURE — 99233 SBSQ HOSP IP/OBS HIGH 50: CPT | Performed by: INTERNAL MEDICINE

## 2020-12-15 PROCEDURE — 94761 N-INVAS EAR/PLS OXIMETRY MLT: CPT

## 2020-12-15 PROCEDURE — 6370000000 HC RX 637 (ALT 250 FOR IP): Performed by: STUDENT IN AN ORGANIZED HEALTH CARE EDUCATION/TRAINING PROGRAM

## 2020-12-15 PROCEDURE — 6360000002 HC RX W HCPCS: Performed by: INTERNAL MEDICINE

## 2020-12-15 PROCEDURE — 85379 FIBRIN DEGRADATION QUANT: CPT

## 2020-12-15 PROCEDURE — 6360000002 HC RX W HCPCS: Performed by: STUDENT IN AN ORGANIZED HEALTH CARE EDUCATION/TRAINING PROGRAM

## 2020-12-15 PROCEDURE — 97530 THERAPEUTIC ACTIVITIES: CPT

## 2020-12-15 PROCEDURE — 71045 X-RAY EXAM CHEST 1 VIEW: CPT

## 2020-12-15 PROCEDURE — 80053 COMPREHEN METABOLIC PANEL: CPT

## 2020-12-15 PROCEDURE — 97116 GAIT TRAINING THERAPY: CPT

## 2020-12-15 PROCEDURE — 97110 THERAPEUTIC EXERCISES: CPT

## 2020-12-15 PROCEDURE — 82248 BILIRUBIN DIRECT: CPT

## 2020-12-15 PROCEDURE — 2580000003 HC RX 258: Performed by: INTERNAL MEDICINE

## 2020-12-15 PROCEDURE — 2060000000 HC ICU INTERMEDIATE R&B

## 2020-12-15 PROCEDURE — 6370000000 HC RX 637 (ALT 250 FOR IP): Performed by: INTERNAL MEDICINE

## 2020-12-15 PROCEDURE — 97535 SELF CARE MNGMENT TRAINING: CPT

## 2020-12-15 RX ORDER — INSULIN GLARGINE 100 [IU]/ML
10 INJECTION, SOLUTION SUBCUTANEOUS ONCE
Status: COMPLETED | OUTPATIENT
Start: 2020-12-15 | End: 2020-12-15

## 2020-12-15 RX ORDER — INSULIN GLARGINE 100 [IU]/ML
45 INJECTION, SOLUTION SUBCUTANEOUS EVERY MORNING
Status: DISCONTINUED | OUTPATIENT
Start: 2020-12-16 | End: 2020-12-16

## 2020-12-15 RX ADMIN — ASPIRIN 81 MG: 81 TABLET, CHEWABLE ORAL at 09:28

## 2020-12-15 RX ADMIN — SODIUM CHLORIDE, PRESERVATIVE FREE 10 ML: 5 INJECTION INTRAVENOUS at 19:44

## 2020-12-15 RX ADMIN — INSULIN LISPRO 12 UNITS: 100 INJECTION, SOLUTION INTRAVENOUS; SUBCUTANEOUS at 09:31

## 2020-12-15 RX ADMIN — CLOPIDOGREL BISULFATE 75 MG: 75 TABLET ORAL at 09:28

## 2020-12-15 RX ADMIN — SODIUM CHLORIDE, PRESERVATIVE FREE 10 ML: 5 INJECTION INTRAVENOUS at 09:28

## 2020-12-15 RX ADMIN — REMDESIVIR 100 MG: 100 INJECTION, POWDER, LYOPHILIZED, FOR SOLUTION INTRAVENOUS at 15:21

## 2020-12-15 RX ADMIN — Medication 50 MG: at 09:28

## 2020-12-15 RX ADMIN — ENOXAPARIN SODIUM 40 MG: 40 INJECTION SUBCUTANEOUS at 19:44

## 2020-12-15 RX ADMIN — AMLODIPINE BESYLATE 5 MG: 5 TABLET ORAL at 09:28

## 2020-12-15 RX ADMIN — INSULIN LISPRO 12 UNITS: 100 INJECTION, SOLUTION INTRAVENOUS; SUBCUTANEOUS at 18:38

## 2020-12-15 RX ADMIN — INSULIN LISPRO 15 UNITS: 100 INJECTION, SOLUTION INTRAVENOUS; SUBCUTANEOUS at 13:02

## 2020-12-15 RX ADMIN — DEXAMETHASONE 6 MG: 4 TABLET ORAL at 09:28

## 2020-12-15 RX ADMIN — PRAVASTATIN SODIUM 80 MG: 80 TABLET ORAL at 19:44

## 2020-12-15 RX ADMIN — ATENOLOL 100 MG: 100 TABLET ORAL at 09:28

## 2020-12-15 RX ADMIN — INSULIN GLARGINE 10 UNITS: 100 INJECTION, SOLUTION SUBCUTANEOUS at 13:02

## 2020-12-15 RX ADMIN — INSULIN GLARGINE 35 UNITS: 100 INJECTION, SOLUTION SUBCUTANEOUS at 09:31

## 2020-12-15 RX ADMIN — ACETAMINOPHEN 650 MG: 325 TABLET ORAL at 15:19

## 2020-12-15 RX ADMIN — OXYCODONE HYDROCHLORIDE AND ACETAMINOPHEN 250 MG: 500 TABLET ORAL at 09:28

## 2020-12-15 RX ADMIN — Medication 1000 UNITS: at 09:28

## 2020-12-15 RX ADMIN — ENOXAPARIN SODIUM 40 MG: 40 INJECTION SUBCUTANEOUS at 09:28

## 2020-12-15 ASSESSMENT — PAIN SCALES - GENERAL: PAINLEVEL_OUTOF10: 4

## 2020-12-15 NOTE — PROGRESS NOTES
hygiene  Toilet Transfer: Contact Guard Assistance. RTS. BED MOBILITY:  Not Tested    TRANSFERS:  Sit to Stand:  Contact Guard Assistance. EOB  Stand to Sit: Contact Guard Assistance. bedside chair    FUNCTIONAL MOBILITY:  Assistive Device: Rolling Walker  Assist Level:  Contact Guard Assistance. Distance: To and from bathroom     ASSESSMENT:     Activity Tolerance:  Patient tolerance of  treatment: good. Discharge Recommendations: Continue to assess pending progress   Equipment Recommendations: Equipment Needed: Yes(possibly RW)  Plan: Times per week: 5x  Times per day: Daily  Current Treatment Recommendations: Balance Training, Functional Mobility Training, Endurance Training, Patient/Caregiver Education & Training, Home Management Training, Self-Care / ADL    Patient Education  Patient Education: ADL's    Goals  Short term goals  Time Frame for Short term goals: by d/c  Short term goal 1: Pt will complete functional mobiltiy short HH distances with SBA and LRAE for increased indep wtih ADLs  Short term goal 2: Pt will tolerate dynamic standing > 3 min with SBA and min VC for safety to increase indep with grooming  Short term goal 3: Pt will complete various t/fs with SBA and no safety cues to increase indep with toileting  Short term goal 4: Pt will complete BUE strengthening HEP to increase indep with ADLs  Long term goals  Time Frame for Long term goals : no LTG d/t short ELOS    Following session, patient left in safe position with all fall risk precautions in place.

## 2020-12-15 NOTE — PROGRESS NOTES
[address] [address]  INPATIENT PHYSICAL THERAPY  DAILY NOTE  [facility] 6A CAPACITY [facility]OL[facility] - 6A--A      Time In: 1003  Time Out: 1046  Timed Code Treatment Minutes: 37 Minutes  Minutes: 43          Date: 12/15/2020  Patient Name: Toan Scruggs,  Gender:  female        MRN: [id number]  : 1955  (77 y.o.)     Referring Practitioner: Dr Moreno Yun  Diagnosis: COVID-19  Additional Pertinent Hx: Pt amditted 12-12 from home with weakness, cough, SOB     Prior Level of Function:  Lives With: Spouse  Type of Home: House  Home Layout: One level  Home Access: Ramped entrance   Bathroom Shower/Tub: Shower chair with back, Tub/Shower unit  Bathroom Toilet: Handicap height    ADL Assistance: [address]: Needs assistance  Homemaking Responsibilities: Yes  Ambulation Assistance: Independent  Transfer Assistance: Independent  Active :  Yes  Additional Comments: Pt denies use of AD at home    Restrictions/Precautions:  Restrictions/Precautions: Fall Risk, General Precautions  Position Activity Restriction  Other position/activity restrictions: covid+--  pt on 4L O2-- 12/15 room air       SUBJECTIVE: nursing ok'd therapy she did clarify to try activity w/o O2 she was on 4L O2 when I stepped in room, pt cooperative she was wanting to use bathroom during session     PAIN: 0/10:       OBJECTIVE:  Bed Mobility:  Supine to Sit: Stand By Assistance, however HOB was elevated pt reported that she doesnt sleep in bed at home     Transfers:  Sit to Stand: Stand By Assistance, from bed and needed min assist from low toilet  she completed multi transfers during [facility]   Stand to Sit:Stand By Assistance on bed and CGA to low toilet     Ambulation:  Stand By Assistance  Distance: 10x2, 80x1  Surface: Level Tile  Device:Rolling Walker  Gait Deviations:  Slow Rosa and wide base of support, she did a short walk in room w/o walker with a waddle type pattern discussed using walker at home for balance and energy conservation- pt on room air during gait her O2 sats did drop to 87 but just for a brief moment and rest of session she was above 88%     Balance:  dynamic balance in bathroom pt reaching to knee ht and right and left with SBA she did tend to hold onto counter for support     Exercise:  Patient was guided in 1 set(s) 10 reps of exercise to both lower extremities. Seated marches, Seated hamstring curls, Seated heel/toe raises and Long arc quads. Exercises were completed for increased independence with functional mobility. Functional Outcome Measures: Completed  AM-PAC Inpatient Mobility Raw Score : 16  AM-PAC Inpatient T-Scale Score : 40.78    ASSESSMENT:  Assessment: Patient progressing toward established goals. and pt tolerated increased activity this date, she does get SOB and require rest breaks she would benefit from skilled therapy   Activity Tolerance:  Patient tolerance of  treatment: fair. She required rest breaks        Equipment Recommendations: Other: cont to assess  Discharge Recommendations:    (home with spouse with 24 hour supervision and home health therapy)    Plan: Times per week: 5 X GM  Specific instructions for Next Treatment: Pts very weak, trial bariatric walker ,may need to follow with W/C if get out of room  Current Treatment Recommendations: Strengthening, Transfer Training, Endurance Training, Balance Training, Functional Mobility Training, Gait Training    Patient Education  Patient Education: Plan of Care, Gait, to use walker     Goals:  Patient goals : Go home  Short term goals  Time Frame for Short term goals: by discharge  Short term goal 1: supine to sit and return with Mod I to get in and out of bed  Short term goal 2: sit to stand with Mod I to get on andoff various surfaces  Short term goal 3: Pt will ambulate with /without AD 50 feet or more with S to walk household distances  Long term goals  Time Frame for Long term goals : NA due to short ELOS    Following

## 2020-12-15 NOTE — PROGRESS NOTES
controlled. Continue atenolol 100 mg daily, Amlodipine 5 mg daily      6. HLD - controlled. Continue pravastatin 81 mg.       7. Chronic lower extremity edema - Will hold Lasix 20 mg due to diarrhea and possible electrolyte abnormality     8. B/L LE Weakness: PT/OT recommending ECF. Consult SW for ECF placement.          Chief Complaint: Bilateral lower extremity weakness, cough, diarrhea     Hospital Course: Angle Willingham is a 77year old female who presents to the Emergency department with complain of bilateral lower extremity weakness of 5 day duration and generalized fatigue. She has PMHx significant for CAD s/p angioplasty with baloon of the RT SFA (2019), PAD with claudication, History of CVA/TIA, Cryptogenic stroke (2010), HTN, HLD, Spinal stenosis, Spondylolisthesis, SOB on exertion, chronic lower extremity edema, Morbid obesity. Patient has been experiencing non-productive cough that started 5 days ago without associated chest pain or shortness of breath at rest. She has been having non-bloody, watery diarrhea without any mucous,  for the same period as well. She denies having fevers or chills. Denies abdominal pain, weakness in the upper extremity, lightheadedness or dizziness. She states that she is not able to walk or stand on her legs.      In the emergency room, her initial vital signs are: respiratory rate 29, heart rate 93, blood pressure 150/89, SpO2 96% on RA. Lab results indicate: Sodium 125, potassium 4.5, chloride 92, Bicarb 21, glucose 350, BUN 9, creatinine 0.7, calcium 8.6, total protein 6.5, AST 24, ALT 20, total bili 0.5, ALK 47, lactic acid 1.3, troponin <0.01, . COVID-19->positive. Chest xray reveals streaky bilateral perihilar and basilar consolidations. Subjective: no acute events overnight. Pt sob with exertion, no fevers or chills. Tolerating PO intake. No chest pain.        Medications:  Reviewed    Infusion Medications    dextrose       Scheduled Medications    appreciated   Skin: Skin color, texture, turgor normal.  No rashes or lesions. Neurologic:  Neurovascularly intact without any focal sensory/motor deficits. Cranial nerves: II-XII intact, grossly non-focal.  Psychiatric: Alert and oriented, thought content appropriate, normal insight  Capillary Refill: Brisk,< 3 seconds   Peripheral Pulses: +2 palpable, equal bilaterally       Labs:   Recent Labs     12/13/20  1201   WBC 5.7   HGB 17.2*   HCT 53.9*        Recent Labs     12/15/20  0623      K 4.7      CO2 24   BUN 18   CREATININE 0.8   CALCIUM 9.2     Recent Labs     12/15/20  0623   AST 15   ALT 16   BILIDIR <0.2   BILITOT 0.5   ALKPHOS 49     No results for input(s): INR in the last 72 hours. No results for input(s): Joyice Hawkins in the last 72 hours. Urinalysis:      Lab Results   Component Value Date    NITRU NEGATIVE 12/12/2020    WBCUA 0-2 12/12/2020    BACTERIA NONE SEEN 12/12/2020    RBCUA NONE SEEN 12/12/2020    BLOODU NEGATIVE 12/12/2020    GLUCOSEU >= 1000 12/12/2020       Radiology:   All imaging reviewed     Diet: DIET CARB CONTROL; Carb Control: 3 carb choices (45 gms)/meal      Code Status: Full Code            Electronically signed by Carolin Byole MD on 12/15/2020 at 2:22 PM

## 2020-12-15 NOTE — CARE COORDINATION
DISASTER CHARTING    12/15/20, 6:48 AM EST    DISCHARGE ONGOING EVALUATION:     [facility] day: 3  Location: 6A-06/006-A Reason for admit: COVID-19 [U07.1]   Barriers to Discharge: TCU following. PT recommending continued therapy with 24 hr supervision at discharge. Following for completion of Remdesivir. Presently afebrile and on 4L/NC with sat 93%. Continue Decadron, Remdesivir and Vits. PCP: PATRICIO Vance CNP  Patient Goals/Plan/Treatment Preferences: From home with spouse. Plans TCU vs return home with [facility]. SW following.

## 2020-12-16 LAB
ALBUMIN SERPL-MCNC: 2.9 G/DL (ref 3.5–5.1)
ALP BLD-CCNC: 46 U/L (ref 38–126)
ALT SERPL-CCNC: 15 U/L (ref 11–66)
ANION GAP SERPL CALCULATED.3IONS-SCNC: 11 MEQ/L (ref 8–16)
AST SERPL-CCNC: 17 U/L (ref 5–40)
BASOPHILS # BLD: 0.6 %
BASOPHILS ABSOLUTE: 0 THOU/MM3 (ref 0–0.1)
BILIRUB SERPL-MCNC: 0.4 MG/DL (ref 0.3–1.2)
BILIRUBIN DIRECT: < 0.2 MG/DL (ref 0–0.3)
BUN BLDV-MCNC: 16 MG/DL (ref 7–22)
CALCIUM SERPL-MCNC: 9 MG/DL (ref 8.5–10.5)
CHLORIDE BLD-SCNC: 102 MEQ/L (ref 98–111)
CO2: 23 MEQ/L (ref 23–33)
CREAT SERPL-MCNC: 0.6 MG/DL (ref 0.4–1.2)
D-DIMER QUANTITATIVE: 399 NG/ML FEU (ref 0–500)
EOSINOPHIL # BLD: 0 %
EOSINOPHILS ABSOLUTE: 0 THOU/MM3 (ref 0–0.4)
ERYTHROCYTE [DISTWIDTH] IN BLOOD BY AUTOMATED COUNT: 15.8 % (ref 11.5–14.5)
ERYTHROCYTE [DISTWIDTH] IN BLOOD BY AUTOMATED COUNT: 44.9 FL (ref 35–45)
FIBRINOGEN: 344 MG/100ML (ref 155–475)
GFR SERPL CREATININE-BSD FRML MDRD: > 90 ML/MIN/1.73M2
GLUCOSE BLD-MCNC: 219 MG/DL (ref 70–108)
GLUCOSE BLD-MCNC: 223 MG/DL (ref 70–108)
GLUCOSE BLD-MCNC: 229 MG/DL (ref 70–108)
GLUCOSE BLD-MCNC: 237 MG/DL (ref 70–108)
GLUCOSE BLD-MCNC: 259 MG/DL (ref 70–108)
HCT VFR BLD CALC: 51.5 % (ref 37–47)
HEMOGLOBIN: 16.7 GM/DL (ref 12–16)
IMMATURE GRANS (ABS): 0.09 THOU/MM3 (ref 0–0.07)
IMMATURE GRANULOCYTES: 1.3 %
LYMPHOCYTES # BLD: 30.6 %
LYMPHOCYTES ABSOLUTE: 2.1 THOU/MM3 (ref 1–4.8)
MCH RBC QN AUTO: 26.8 PG (ref 26–33)
MCHC RBC AUTO-ENTMCNC: 32.4 GM/DL (ref 32.2–35.5)
MCV RBC AUTO: 82.8 FL (ref 81–99)
MONOCYTES # BLD: 9.1 %
MONOCYTES ABSOLUTE: 0.6 THOU/MM3 (ref 0.4–1.3)
NUCLEATED RED BLOOD CELLS: 0 /100 WBC
PLATELET # BLD: 224 THOU/MM3 (ref 130–400)
PMV BLD AUTO: 11.1 FL (ref 9.4–12.4)
POTASSIUM SERPL-SCNC: 4 MEQ/L (ref 3.5–5.2)
RBC # BLD: 6.22 MILL/MM3 (ref 4.2–5.4)
REASON FOR REJECTION: NORMAL
REJECTED TEST: NORMAL
SCAN OF BLOOD SMEAR: NORMAL
SEG NEUTROPHILS: 58.4 %
SEGMENTED NEUTROPHILS ABSOLUTE COUNT: 4 THOU/MM3 (ref 1.8–7.7)
SODIUM BLD-SCNC: 136 MEQ/L (ref 135–145)
TOTAL PROTEIN: 6.2 G/DL (ref 6.1–8)
WBC # BLD: 6.8 THOU/MM3 (ref 4.8–10.8)

## 2020-12-16 PROCEDURE — 80053 COMPREHEN METABOLIC PANEL: CPT

## 2020-12-16 PROCEDURE — 99232 SBSQ HOSP IP/OBS MODERATE 35: CPT | Performed by: INTERNAL MEDICINE

## 2020-12-16 PROCEDURE — 2500000003 HC RX 250 WO HCPCS: Performed by: INTERNAL MEDICINE

## 2020-12-16 PROCEDURE — 94761 N-INVAS EAR/PLS OXIMETRY MLT: CPT

## 2020-12-16 PROCEDURE — 82948 REAGENT STRIP/BLOOD GLUCOSE: CPT

## 2020-12-16 PROCEDURE — 2060000000 HC ICU INTERMEDIATE R&B

## 2020-12-16 PROCEDURE — 97116 GAIT TRAINING THERAPY: CPT

## 2020-12-16 PROCEDURE — 2500000003 HC RX 250 WO HCPCS: Performed by: PHYSICIAN ASSISTANT

## 2020-12-16 PROCEDURE — 6370000000 HC RX 637 (ALT 250 FOR IP): Performed by: STUDENT IN AN ORGANIZED HEALTH CARE EDUCATION/TRAINING PROGRAM

## 2020-12-16 PROCEDURE — 97535 SELF CARE MNGMENT TRAINING: CPT

## 2020-12-16 PROCEDURE — 85379 FIBRIN DEGRADATION QUANT: CPT

## 2020-12-16 PROCEDURE — 85025 COMPLETE CBC W/AUTO DIFF WBC: CPT

## 2020-12-16 PROCEDURE — 2580000003 HC RX 258: Performed by: INTERNAL MEDICINE

## 2020-12-16 PROCEDURE — 85385 FIBRINOGEN ANTIGEN: CPT

## 2020-12-16 PROCEDURE — 6370000000 HC RX 637 (ALT 250 FOR IP): Performed by: INTERNAL MEDICINE

## 2020-12-16 PROCEDURE — 97110 THERAPEUTIC EXERCISES: CPT

## 2020-12-16 PROCEDURE — 36415 COLL VENOUS BLD VENIPUNCTURE: CPT

## 2020-12-16 PROCEDURE — 6360000002 HC RX W HCPCS: Performed by: STUDENT IN AN ORGANIZED HEALTH CARE EDUCATION/TRAINING PROGRAM

## 2020-12-16 PROCEDURE — 97530 THERAPEUTIC ACTIVITIES: CPT

## 2020-12-16 PROCEDURE — 82248 BILIRUBIN DIRECT: CPT

## 2020-12-16 PROCEDURE — 2580000003 HC RX 258: Performed by: STUDENT IN AN ORGANIZED HEALTH CARE EDUCATION/TRAINING PROGRAM

## 2020-12-16 RX ORDER — INSULIN GLARGINE 100 [IU]/ML
25 INJECTION, SOLUTION SUBCUTANEOUS 2 TIMES DAILY
Status: DISCONTINUED | OUTPATIENT
Start: 2020-12-16 | End: 2020-12-16

## 2020-12-16 RX ORDER — INSULIN GLARGINE 100 [IU]/ML
25 INJECTION, SOLUTION SUBCUTANEOUS 2 TIMES DAILY
Status: DISCONTINUED | OUTPATIENT
Start: 2020-12-17 | End: 2020-12-18 | Stop reason: HOSPADM

## 2020-12-16 RX ADMIN — INSULIN LISPRO 6 UNITS: 100 INJECTION, SOLUTION INTRAVENOUS; SUBCUTANEOUS at 18:08

## 2020-12-16 RX ADMIN — POLYETHYLENE GLYCOL 3350 17 G: 17 POWDER, FOR SOLUTION ORAL at 09:52

## 2020-12-16 RX ADMIN — MICONAZOLE NITRATE: 20 POWDER TOPICAL at 00:00

## 2020-12-16 RX ADMIN — ASPIRIN 81 MG: 81 TABLET, CHEWABLE ORAL at 09:46

## 2020-12-16 RX ADMIN — ATENOLOL 100 MG: 100 TABLET ORAL at 09:47

## 2020-12-16 RX ADMIN — MICONAZOLE NITRATE: 20 POWDER TOPICAL at 20:33

## 2020-12-16 RX ADMIN — INSULIN LISPRO 9 UNITS: 100 INJECTION, SOLUTION INTRAVENOUS; SUBCUTANEOUS at 13:05

## 2020-12-16 RX ADMIN — PRAVASTATIN SODIUM 80 MG: 80 TABLET ORAL at 20:33

## 2020-12-16 RX ADMIN — CLOPIDOGREL BISULFATE 75 MG: 75 TABLET ORAL at 09:46

## 2020-12-16 RX ADMIN — ENOXAPARIN SODIUM 40 MG: 40 INJECTION SUBCUTANEOUS at 20:33

## 2020-12-16 RX ADMIN — AMLODIPINE BESYLATE 5 MG: 5 TABLET ORAL at 09:47

## 2020-12-16 RX ADMIN — INSULIN LISPRO 6 UNITS: 100 INJECTION, SOLUTION INTRAVENOUS; SUBCUTANEOUS at 09:55

## 2020-12-16 RX ADMIN — SODIUM CHLORIDE, PRESERVATIVE FREE 10 ML: 5 INJECTION INTRAVENOUS at 09:49

## 2020-12-16 RX ADMIN — INSULIN GLARGINE 45 UNITS: 100 INJECTION, SOLUTION SUBCUTANEOUS at 09:55

## 2020-12-16 RX ADMIN — Medication 1000 UNITS: at 09:47

## 2020-12-16 RX ADMIN — SODIUM CHLORIDE, PRESERVATIVE FREE 10 ML: 5 INJECTION INTRAVENOUS at 20:40

## 2020-12-16 RX ADMIN — ENOXAPARIN SODIUM 40 MG: 40 INJECTION SUBCUTANEOUS at 09:55

## 2020-12-16 RX ADMIN — REMDESIVIR 100 MG: 100 INJECTION, POWDER, LYOPHILIZED, FOR SOLUTION INTRAVENOUS at 16:37

## 2020-12-16 RX ADMIN — MICONAZOLE NITRATE: 20 POWDER TOPICAL at 09:49

## 2020-12-16 RX ADMIN — OXYCODONE HYDROCHLORIDE AND ACETAMINOPHEN 250 MG: 500 TABLET ORAL at 09:47

## 2020-12-16 RX ADMIN — Medication 50 MG: at 09:47

## 2020-12-16 RX ADMIN — ACETAMINOPHEN 650 MG: 325 TABLET ORAL at 21:25

## 2020-12-16 ASSESSMENT — PAIN SCALES - GENERAL: PAINLEVEL_OUTOF10: 7

## 2020-12-16 NOTE — PROGRESS NOTES
Patient is doing too good ith therapy for TCU patient walked 80 feet SBA. Israel Springer made aware.

## 2020-12-16 NOTE — PROGRESS NOTES
[facility] 6A CAPACITY [facility]  Occupational Therapy  Daily Note  Time:    Time In: 1725  Time Out: 1508  Timed Code Treatment Minutes: 23 Minutes  Minutes: 23          Date: 2020  Patient Name: Sahron Gray,   Gender: female      Room: [facility]-A  MRN: [id number]  : 1955  (77 y.o.)  Referring Practitioner: Parker Lazaro MD  Diagnosis: COVID-19  Additional Pertinent Hx: Angle Penny is a 77year old female who presents to the Emergency department with complain of bilateral lower extremity weakness of 5 day duration and generalized fatigue. She has PMHx significant for CAD s/p angioplasty with baloon of the RT SFA (), PAD with claudication, History of CVA/TIA, Cryptogenic stroke (), HTN, HLD, Spinal stenosis, Spondylolisthesis, SOB on exertion, chronic lower extremity edema, Morbid obesity. Patient has been experiencing non-productive cough that started 5 days ago without associated chest pain or shortness of breath at rest. She has been having non-bloody, watery diarrhea without any mucous,  for the same period as well. She denies having fevers or chills. Denies abdominal pain, weakness in the upper extremity, lightheadedness or dizziness. She states that she is not able to walk or stand on her legs. Restrictions/Precautions:  Restrictions/Precautions: Fall Risk, General Precautions  Position Activity Restriction  Other position/activity restrictions: covid+-- 12/14 pt on 4L O2-- 12/15 room air      SUBJECTIVE: Pt wanting to use bathroom stating every time she coughs she has to go. Pt expressing fatigue as well     PAIN: 0/10:      COGNITION: WNL    ADL:   Toileting: [employer]. for hygiene on both occasions of using bathroom after having bowel movement both times  Toilet Transfer: [employer]. from raised toilet.   Increased time using toilet d/t having to use x2 occasions throughout session    BALANCE:  Sitting Balance:  Stand By Assistance. at EOB  Standing Balance: [employer]. with bilateral UE support standing at walker    BED MOBILITY:  Not Tested    TRANSFERS:  Sit to Stand:  Contact Guard Assistance. from eOB   Stand to Sit: [employer]. onto eOB with cues for safety to keep hands on walker and keep walker with her at all times. Pt wanting to place alker of fto the side for last couple steps    FUNCTIONAL MOBILITY:  Assistive Device: Rolling Walker  Assist Level:  Contact Guard Assistance. Distance: To and from bathroom  Upon return form bathroom, pts O2 sats at 84% requiring cues for breathing tech and rest break for recovery. After rest break sats recovered to 94%       ASSESSMENT:     Activity Tolerance:  Patient tolerance of  treatment: fair. Pt with decreased sats after mobility      Discharge Recommendations: Continue to assess pending progress    Equipment Recommendations: Equipment Needed: Yes(possibly RW)  Plan: Times per week: 5x  Times per day: Daily  Current Treatment Recommendations: Balance Training, Functional Mobility Training, Endurance Training, Patient/Caregiver Education & Training, Home Management Training, Self-Care / ADL    Patient Education  Patient Education: safety with use of walker    Goals  Short term goals  Time Frame for Short term goals: by d/c  Short term goal 1: Pt will complete functional mobiltiy short HH distances with SBA and LRAE for increased indep wtih ADLs  Short term goal 2: Pt will tolerate dynamic standing > 3 min with SBA and min VC for safety to increase indep with grooming  Short term goal 3: Pt will complete various t/fs with SBA and no safety cues to increase indep with toileting  Short term goal 4: Pt will complete BUE strengthening HEP to increase indep with ADLs  Long term goals  Time Frame for Long term goals : no LTG d/t short ELOS    Following session, patient left in safe position with all fall risk precautions in place.

## 2020-12-16 NOTE — PROGRESS NOTES
Hospitalist       Patient:  Rita Stout    Unit/Bed:6A-06/006-A  YOB: 1955  MRN: [id number]   Acct: [de-identified]     PCP: PATRICIO Beauchamp CNP  Date of Admission: 12/12/2020        Assessment and Plan:        1. Acute hypoxic respiratory failure due to pneumonia secondary to COVID-19: Patient has markedly improved on 2 L nasal cannula, day 4 of remdesivir and day 5 of Decadron, continue with vitamin C, D, and zinc  2. Type 2 diabetes mellitus continue Lantus and insulin sliding scale, will modify his Lantus to twice daily dosing  3. Hypertension essential controlled continue home medications  4. Hyperlipidemia controlled continue with statin therapy    Consider discharge to home after completion of remdesivir, need home health and possibly home oxygen therapy. CC: Weakness cough, diarrhea    HPI: 78-year-old female presents emergency department with complaint of bilateral lower extremity weakness of 5-day duration generalized fatigue. Has a past medical's history of coronary artery disease status post angioplasty to the right SFA, PAD with claudication, history of CVA, cryptogenic stroke, hypertension, hyperlipidemia, spinal stenosis, spondylolisthesis, shortness of breath on exertion and chronic lower extremity edema, morbid obesity. Patient has been experiencing nonproductive cough that started 5 days ago with associated chest pain or shortness of breath at rest.  She is has been on bloody watery diarrhea without any mucus. She denies fevers, or chills,    ROS (14 point review of systems completed. Pertinent positives noted. Otherwise ROS is negative) :  Shortness of breath, cough, lower extremity weakness,    PMH:  Per HPI and       Diagnosis Date    CAD (coronary artery disease)     Degenerative lumbar spinal stenosis 1/20/2015    GERD (gastroesophageal reflux disease)     Gout     History of MI (myocardial infarction) 2004    History of stroke     Hyperlipidemia     Hypertension     Morbid obesity with body mass index of 50.0-59.9 in adult [facility])     Spondylolisthesis of lumbar region 1/20/2015    Bilateral L5 pars defects.     Unspecified cerebral artery occlusion with cerebral infarction 2010     SHX:        Procedure Laterality Date    BACK SURGERY      COLONOSCOPY      ENDOSCOPY, COLON, DIAGNOSTIC      JOINT REPLACEMENT Bilateral     knees    KNEE SURGERY  2006    x2    OH ECHO TRANSESOPHAG R-T 2D IMG ACQUISJ I&R ONLY Left 6/8/2018    TRANSESOPHAGEAL ECHOCARDIOGRAM performed by Fatemeh Glaser MD at [address] Right 06/2019    x3    VASCULAR SURGERY      left carotid     FHX:       Problem Relation Age of Onset    Stroke Mother     Heart Disease Father     Heart Attack Father     Cancer Sister    [facility] Cancer Sister     Diabetes Neg Hx      SOCHX:   Social History     Socioeconomic History    Marital status:      Spouse name: Fatemeh Glaser Number of children: 3    Years of education: None    Highest education level: None   Occupational History    Occupation: disability   Social Needs    Financial resource strain: None    Food insecurity     Worry: None     Inability: None    Transportation needs     Medical: None     Non-medical: None   Tobacco Use    Smoking status: Never Smoker    Smokeless tobacco: Never Used   Substance and Sexual Activity    Alcohol use: Never     Frequency: Never    Drug use: No    Sexual activity: Yes     Partners: Male   Lifestyle    Physical activity     Days per week: None     Minutes per session: None    Stress: None   Relationships    Social connections     Talks on phone: None     Gets together: None     Attends [employer] service: None     Active member of club or organization: None     Attends meetings of clubs or organizations: None     Relationship status: None    Intimate partner violence     Fear of current or ex partner: None     Emotionally abused: None     Physically abused: None     Forced sexual activity: None   Other Topics Concern    None   Social History Narrative    None      Allergies: Codeine and Vicodin [hydrocodone-acetaminophen]  Medications:     dextrose        insulin glargine  45 Units Subcutaneous QAM    miconazole   Topical BID    zinc sulfate  50 mg Oral Daily    Vitamin D  1,000 Units Oral Daily    remdesivir IVPB  100 mg Intravenous Q24H    amLODIPine  5 mg Oral Daily    vitamin C  250 mg Oral Daily    aspirin  81 mg Oral Daily    atenolol  100 mg Oral Daily    clopidogrel  75 mg Oral Daily    pravastatin  80 mg Oral Daily    sodium chloride flush  10 mL Intravenous 2 times per day    enoxaparin  40 mg Subcutaneous BID    phosphorus replacement protocol   Other RX Placeholder    potassium replacement protocol   Other RX Placeholder    magnesium replacement protocol   Other RX Placeholder    insulin lispro  0-18 Units Subcutaneous TID WC    insulin lispro  0-9 Units Subcutaneous Nightly     Prior to Admission medications    Medication Sig Start Date End Date Taking? Authorizing Provider   insulin glargine (LANTUS) 100 UNIT/ML injection vial Inject 10 Units into the skin every morning   Yes Historical Provider, MD   clopidogrel (PLAVIX) 75 MG tablet Take 1 tablet by mouth daily 12/6/19  Yes Darby Bryan MD   pravastatin (PRAVACHOL) 80 MG tablet Take 80 mg by mouth daily   Yes Historical Provider, MD   Ascorbic Acid (VITAMIN C) 250 MG tablet Take 250 mg by mouth daily   Yes Historical Provider, MD   amLODIPine (NORVASC) 5 MG tablet Take 5 mg by mouth daily. Yes Historical Provider, MD   atenolol (TENORMIN) 100 MG tablet Take 100 mg by mouth.  1/2 tab daily    Yes Historical Provider, MD   aspirin 81 MG chewable tablet Take 1 tablet by mouth daily 6/1/17   Ravin Spring, APRN - CNP      PHYSICAL EXAM:    /70   Pulse 60   Temp 97.7 °F (36.5 °C) (Oral)   Resp 16   Ht 5' 8\" (1.727 m)   Wt (!) 348 lb 11.2 oz (158.2 kg)   SpO2 92%   BMI 53.02 kg/m²     General appearance:  No apparent distress, appears stated age and cooperative. HEENT:  Normal cephalic, atraumatic without obvious deformity. Pupils equal, round, and reactive to light. Extra ocular muscles intact. Conjunctivae/corneas clear. Neck: Supple, with full range of motion. no jugular venous distention. Trachea midline. no carotid bruits  Respiratory:  Normal respiratory effort. Clear to auscultation, bilaterally without Rales/Wheezes/Rhonchi. Breath sounds equal bilaterally  Cardiovascular:  Regular rate and rhythm with normal S1/S2 without murmurs, rubs or gallops. PMI non displaced  Abdomen: Soft, non-tender, non-distended with normal bowel sounds. No guarding, rebound. Musculoskeletal:  No clubbing, cyanosis or edema bilaterally. Full range of motion without deformity. Skin: Skin color, texture, turgor normal.  No rashes or lesions, or suspicious lesions. Neurologic:  Neurovascularly intact without any focal sensory/motor deficits. Cranial nerves: II-XII intact, grossly non-focal.  Psychiatric:  Alert and oriented, thought content appropriate, normal insight  Capillary Refill: Brisk,< 2 seconds   Peripheral Pulses: +2 palpable, equal bilaterally upper and lower extremities  Lymphatics: no lymphadenopathy    Data: (All radiographs, tracings, PFTs, and imaging are personally viewed and interpreted unless otherwise noted).  Labs pending  Recent Labs     12/13/20  1201 12/16/20  0707   WBC 5.7 6.8   HGB 17.2* 16.7*   HCT 53.9* 51.5*    224      Recent Labs     12/14/20  1121 12/15/20  0623 12/16/20  0840   * 137 136   K 4.1 4.7 4.0   CL 98 101 102   CO2 20* 24 23   BUN 15 18 16   CREATININE 0.7 0.8 0.6   CALCIUM 8.7 9.2 9.0      Recent Labs     12/14/20  1121 12/15/20  0623 12/16/20  0840   AST 20 15 17   ALT 17 16 15   BILIDIR <0.2 <0.2 <0.2   BILITOT 0.5 0.5 0.4   ALKPHOS 42 49 46       No results for input(s): INR in the last 72 hours.    No results for input(s): Rei Morton in the last 72 hours. Radiology reports-images reviewed in PACS  Ct Chest Wo Contrast    Result Date: 12/14/2020  PROCEDURE: CT CHEST WO CONTRAST CLINICAL INFORMATION: 43-year-old female with shortness of breath. Positive for Covid 19. COMPARISON: CT scan 06/01/2017. TECHNIQUE: 5 mm noncontrast axial images were obtained through the chest. Sagittal and coronal reconstructions were obtained. All CT scans at this facility use dose modulation, iterative reconstruction, and/or weight-based dosing when appropriate to reduce radiation dose to as low as reasonably achievable. FINDINGS: The thyroid gland is enlarged and heterogenous in appearance. Central airways are patent. There is cardiomegaly. There is no pericardial effusion. There are coronary artery calcifications. There is mediastinal lymphadenopathy. There are some calcified atherosclerotic changes in the thoracic aorta. There is no aneurysmal dilatation. There are diffuse groundglass and nodular infiltrates throughout the lungs bilaterally predominantly in a peripheral distribution. There is no pleural effusion or pneumothorax. The liver appears hypoattenuated which may be due to fatty infiltration. Multilevel degenerative changes are seen throughout the spine. There is a right shoulder arthroplasty. Diffuse groundglass and nodular opacities throughout both lungs consistent with extensive pneumonia bilaterally. **This report has been created using voice recognition software. It may contain minor errors which are inherent in voice recognition technology. ** Final report electronically signed by Dr Guy Mckeon on 12/14/2020 12:48 PM    Xr Chest Portable    Result Date: 12/15/2020  PROCEDURE: XR CHEST PORTABLE CLINICAL INFORMATION: sob . COMPARISON: 12/12/2020 TECHNIQUE: Portable upright FINDINGS: Exam is limited by portable technique and body habitus. There is also reported aspiration. Heart size enlarged.  Subsegmental atelectasis left base. No effusions are seen. The pulmonary vascularity is not increased. Prior right shoulder replacement. EKG leads overlie the chest. Implanted cardiac monitor left chest     Subsegmental atelectasis left base. Cardiomegaly. **This report has been created using voice recognition software. It may contain minor errors which are inherent in voice recognition technology. ** Final report electronically signed by Dr. Sabas Rodriguez on 12/15/2020 10:11 AM    Xr Chest Portable    Result Date: 12/12/2020  Exam: 1V chest Comparison:  CR  - CHEST MOBILE SINGLE  - 05/31/2017 04:55 PM EDT Findings: Mediastinum: Stable moderate cardiac silhouette enlargement. Lungs: Streaky bilateral perihilar and basilar consolidations concerning for atypical infiltrates, new vs prior. Pleura: No pneumothorax or significant effusion. Bones: No acute pathology. Right shoulder arthroplasty. Impression: Streaky bilateral perihilar and basilar consolidations concerning for atypical infiltrates, new vs prior. This document has been electronically signed by:  Jean Claude Perry MD on 12/12/2020 05:21 AM       Electronically signed by Jose Rangel DO on 12/16/2020 at 11:06 AM

## 2020-12-16 NOTE — PROGRESS NOTES
[address] [address]  INPATIENT PHYSICAL THERAPY  DAILY NOTE  STRZ 6A CAPACITY EBOLA - 6A--A    Time In: 3335  Time Out: 1154  Timed Code Treatment Minutes: 44 Minutes  Minutes: 39          Date: 2020  Patient Name: Carol Taylor,  Gender:  female        MRN: [id number]  : 1955  (77 y.o.)     Referring Practitioner: Dr Russ Marks  Diagnosis: COVID-19  Additional Pertinent Hx: Pt amditted 12-12 from home with weakness, cough, SOB     Prior Level of Function:  Lives With: Spouse  Type of Home: House  Home Layout: One level  Home Access: Ramped entrance   Bathroom Shower/Tub: Shower chair with back, Tub/Shower unit  Bathroom Toilet: Handicap height    ADL Assistance: [address]: Needs assistance  Homemaking Responsibilities: Yes  Ambulation Assistance: Independent  Transfer Assistance: Independent  Active : Yes  Additional Comments: Pt denies use of AD at home    Restrictions/Precautions:  Restrictions/Precautions: Fall Risk, General Precautions  Position Activity Restriction  Other position/activity restrictions: covid+--  pt on 4L O2-- 12/15 room air     SUBJECTIVE: Pt. Binh Late in bed upon arrival and pleasantly agrees to therapy session. Pt. Requested to use restroom during session. Extra time required to manage lines. PAIN: None indicated    Vitals: WNLs on 1.5L O2 via NC    OBJECTIVE:  Bed Mobility:  Supine to Sit: Stand By Assistance  Sit to Supine: Stand By Assistance     Transfers:  Sit to Stand: Stand By Assistance  Stand to Sit:Stand By Assistance    Ambulation:  Stand By Assistance  Distance: 100' x 1, 10' x 1  Surface: Level Tile  Device:Rolling Walker  Gait Deviations: Forward Flexed Posture, Slow Rosa, Decreased Step Length Bilaterally, Decreased Gait Speed, Decreased Heel Strike Bilaterally and decreased endurance. Balance:  Pt. sat at EOB/stood at [facility] while completing pericare/clothing management and while PTA managed lines.  Pt. steady overall but easily fatigues. Exercise:  None    Functional Outcome Measures: Not completed       ASSESSMENT:  Assessment: Patient progressing toward established goals. Activity Tolerance:  Patient tolerance of  treatment: good. Equipment Recommendations: Other: cont to assess  Discharge Recommendations:  (home with spouse with 24 hour supervision and home health therapy)    Plan: Times per week: 5 X GM  Specific instructions for Next Treatment: Pts very weak, trial bariatric walker ,may need to follow with W/C if get out of room  Current Treatment Recommendations: Strengthening, Transfer Training, Endurance Training, Balance Training, Functional Mobility Training, Gait Training    Patient Education  Patient Education: Plan of Care, Reviewed Prior Education, Gait, Health Promotion and Wellness Education    Goals:  Patient goals : Go home  Short term goals  Time Frame for Short term goals: by discharge  Short term goal 1: supine to sit and return with Mod I to get in and out of bed  Short term goal 2: sit to stand with Mod I to get on andoff various surfaces  Short term goal 3: Pt will ambulate with /without AD 50 feet or more with S to walk household distances  Long term goals  Time Frame for Long term goals : NA due to short ELOS    Following session, patient left in safe position with all fall risk precautions in place.

## 2020-12-17 LAB
ALBUMIN SERPL-MCNC: 3 G/DL (ref 3.5–5.1)
ALP BLD-CCNC: 46 U/L (ref 38–126)
ALT SERPL-CCNC: 21 U/L (ref 11–66)
AST SERPL-CCNC: 21 U/L (ref 5–40)
BILIRUB SERPL-MCNC: 0.5 MG/DL (ref 0.3–1.2)
BILIRUBIN DIRECT: < 0.2 MG/DL (ref 0–0.3)
BLOOD CULTURE, ROUTINE: NORMAL
D-DIMER QUANTITATIVE: 582 NG/ML FEU (ref 0–500)
FIBRINOGEN: 377 MG/100ML (ref 155–475)
GLUCOSE BLD-MCNC: 141 MG/DL (ref 70–108)
GLUCOSE BLD-MCNC: 176 MG/DL (ref 70–108)
GLUCOSE BLD-MCNC: 188 MG/DL (ref 70–108)
GLUCOSE BLD-MCNC: 205 MG/DL (ref 70–108)
TOTAL PROTEIN: 5.5 G/DL (ref 6.1–8)

## 2020-12-17 PROCEDURE — 36415 COLL VENOUS BLD VENIPUNCTURE: CPT

## 2020-12-17 PROCEDURE — 80076 HEPATIC FUNCTION PANEL: CPT

## 2020-12-17 PROCEDURE — 82948 REAGENT STRIP/BLOOD GLUCOSE: CPT

## 2020-12-17 PROCEDURE — 2060000000 HC ICU INTERMEDIATE R&B

## 2020-12-17 PROCEDURE — 2580000003 HC RX 258: Performed by: INTERNAL MEDICINE

## 2020-12-17 PROCEDURE — 99232 SBSQ HOSP IP/OBS MODERATE 35: CPT | Performed by: INTERNAL MEDICINE

## 2020-12-17 PROCEDURE — 6370000000 HC RX 637 (ALT 250 FOR IP): Performed by: INTERNAL MEDICINE

## 2020-12-17 PROCEDURE — 6360000002 HC RX W HCPCS: Performed by: STUDENT IN AN ORGANIZED HEALTH CARE EDUCATION/TRAINING PROGRAM

## 2020-12-17 PROCEDURE — 2580000003 HC RX 258: Performed by: STUDENT IN AN ORGANIZED HEALTH CARE EDUCATION/TRAINING PROGRAM

## 2020-12-17 PROCEDURE — 2500000003 HC RX 250 WO HCPCS: Performed by: INTERNAL MEDICINE

## 2020-12-17 PROCEDURE — 6370000000 HC RX 637 (ALT 250 FOR IP): Performed by: STUDENT IN AN ORGANIZED HEALTH CARE EDUCATION/TRAINING PROGRAM

## 2020-12-17 PROCEDURE — 85379 FIBRIN DEGRADATION QUANT: CPT

## 2020-12-17 PROCEDURE — 97110 THERAPEUTIC EXERCISES: CPT

## 2020-12-17 PROCEDURE — 85385 FIBRINOGEN ANTIGEN: CPT

## 2020-12-17 RX ADMIN — INSULIN LISPRO 3 UNITS: 100 INJECTION, SOLUTION INTRAVENOUS; SUBCUTANEOUS at 17:32

## 2020-12-17 RX ADMIN — ASPIRIN 81 MG: 81 TABLET, CHEWABLE ORAL at 07:59

## 2020-12-17 RX ADMIN — SODIUM CHLORIDE, PRESERVATIVE FREE 10 ML: 5 INJECTION INTRAVENOUS at 08:06

## 2020-12-17 RX ADMIN — ENOXAPARIN SODIUM 40 MG: 40 INJECTION SUBCUTANEOUS at 21:12

## 2020-12-17 RX ADMIN — Medication 1000 UNITS: at 08:02

## 2020-12-17 RX ADMIN — MICONAZOLE NITRATE: 20 POWDER TOPICAL at 08:05

## 2020-12-17 RX ADMIN — SODIUM CHLORIDE, PRESERVATIVE FREE 10 ML: 5 INJECTION INTRAVENOUS at 21:12

## 2020-12-17 RX ADMIN — PRAVASTATIN SODIUM 80 MG: 80 TABLET ORAL at 21:12

## 2020-12-17 RX ADMIN — ACETAMINOPHEN 650 MG: 325 TABLET ORAL at 06:52

## 2020-12-17 RX ADMIN — INSULIN GLARGINE 25 UNITS: 100 INJECTION, SOLUTION SUBCUTANEOUS at 21:00

## 2020-12-17 RX ADMIN — Medication 50 MG: at 08:02

## 2020-12-17 RX ADMIN — INSULIN LISPRO 3 UNITS: 100 INJECTION, SOLUTION INTRAVENOUS; SUBCUTANEOUS at 13:17

## 2020-12-17 RX ADMIN — REMDESIVIR 100 MG: 100 INJECTION, POWDER, LYOPHILIZED, FOR SOLUTION INTRAVENOUS at 16:50

## 2020-12-17 RX ADMIN — CLOPIDOGREL BISULFATE 75 MG: 75 TABLET ORAL at 07:59

## 2020-12-17 RX ADMIN — INSULIN GLARGINE 25 UNITS: 100 INJECTION, SOLUTION SUBCUTANEOUS at 08:02

## 2020-12-17 RX ADMIN — ENOXAPARIN SODIUM 40 MG: 40 INJECTION SUBCUTANEOUS at 08:00

## 2020-12-17 RX ADMIN — MICONAZOLE NITRATE: 20 POWDER TOPICAL at 21:12

## 2020-12-17 RX ADMIN — OXYCODONE HYDROCHLORIDE AND ACETAMINOPHEN 250 MG: 500 TABLET ORAL at 08:01

## 2020-12-17 RX ADMIN — INSULIN LISPRO 3 UNITS: 100 INJECTION, SOLUTION INTRAVENOUS; SUBCUTANEOUS at 08:49

## 2020-12-17 ASSESSMENT — PAIN SCALES - GENERAL
PAINLEVEL_OUTOF10: 7
PAINLEVEL_OUTOF10: 0
PAINLEVEL_OUTOF10: 0
PAINLEVEL_OUTOF10: 3
PAINLEVEL_OUTOF10: 5
PAINLEVEL_OUTOF10: 0

## 2020-12-17 ASSESSMENT — PAIN DESCRIPTION - PROGRESSION
CLINICAL_PROGRESSION: NOT CHANGED
CLINICAL_PROGRESSION: NOT CHANGED

## 2020-12-17 ASSESSMENT — PAIN DESCRIPTION - LOCATION
LOCATION: ABDOMEN
LOCATION: HEAD
LOCATION: ABDOMEN

## 2020-12-17 ASSESSMENT — PAIN DESCRIPTION - DESCRIPTORS
DESCRIPTORS: HEADACHE
DESCRIPTORS: CRAMPING
DESCRIPTORS: DISCOMFORT

## 2020-12-17 ASSESSMENT — PAIN DESCRIPTION - PAIN TYPE
TYPE: ACUTE PAIN

## 2020-12-17 ASSESSMENT — PAIN DESCRIPTION - ORIENTATION
ORIENTATION: MID
ORIENTATION: MID

## 2020-12-17 ASSESSMENT — PAIN DESCRIPTION - ONSET
ONSET: ON-GOING
ONSET: ON-GOING

## 2020-12-17 ASSESSMENT — PAIN DESCRIPTION - FREQUENCY
FREQUENCY: INTERMITTENT

## 2020-12-17 NOTE — PROGRESS NOTES
Hospitalist       Patient:  Denny Courser    Unit/Bed:6A-06/006-A  YOB: 1955  MRN: [id number]   Acct: [de-identified]     PCP: PATRICIO Castillo CNP  Date of Admission: 12/12/2020        Assessment and Plan:        1. Acute hypoxic respiratory failure due to pneumonia secondary to COVID-19: Patient has markedly improved on 2 L nasal cannula, day 5 of remdesivir and day 6 of Decadron, continue with vitamin C, D, and zinc  2. Type 2 diabetes mellitus continue Lantus and insulin sliding scale, will modify his Lantus to twice daily dosing  3. Hypertension essential controlled continue home medications  4. Hyperlipidemia controlled continue with statin therapy    Consider discharge to home after completion of remdesivir, need home health and possibly home oxygen therapy. Possible discharge 12/18/2020 will need home oxygen if patient is not on room air. CC: Weakness cough, diarrhea    HPI: 13-year-old female presents emergency department with complaint of bilateral lower extremity weakness of 5-day duration generalized fatigue. Has a past medical's history of coronary artery disease status post angioplasty to the right SFA, PAD with claudication, history of CVA, cryptogenic stroke, hypertension, hyperlipidemia, spinal stenosis, spondylolisthesis, shortness of breath on exertion and chronic lower extremity edema, morbid obesity. Patient has been experiencing nonproductive cough that started 5 days ago with associated chest pain or shortness of breath at rest.  She is has been on bloody watery diarrhea without any mucus. She denies fevers, or chills,    ROS (14 point review of systems completed. Pertinent positives noted. Otherwise ROS is negative) :  Shortness of breath, cough, lower extremity weakness,    PMH:  Per HPI and       Diagnosis Date    CAD (coronary artery disease)     Degenerative lumbar spinal stenosis 1/20/2015    GERD (gastroesophageal reflux disease)     Gout     History of MI (myocardial infarction) 2004    History of stroke     Hyperlipidemia     Hypertension     Morbid obesity with body mass index of 50.0-59.9 in adult [facility])     Spondylolisthesis of lumbar region 1/20/2015    Bilateral L5 pars defects.     Unspecified cerebral artery occlusion with cerebral infarction 2010     SHX:        Procedure Laterality Date    BACK SURGERY      COLONOSCOPY      ENDOSCOPY, COLON, DIAGNOSTIC      JOINT REPLACEMENT Bilateral     knees    KNEE SURGERY  2006    x2    [place] ECHO TRANSESOPHAG R-T 2D IMG ACQUISJ I&R ONLY Left 6/8/2018    TRANSESOPHAGEAL ECHOCARDIOGRAM performed by Avila Ag MD at [address] Right 06/2019    x3    VASCULAR SURGERY      left carotid     FHX:       Problem Relation Age of Onset    Stroke Mother     Heart Disease Father     Heart Attack Father     Cancer Sister    [facility] Cancer Sister     Diabetes Neg Hx      SOCHX:   Social History     Socioeconomic History    Marital status:      Spouse name: Avila Ag Number of children: 3    Years of education: None    Highest education level: None   Occupational History    Occupation: disability   Social Needs    Financial resource strain: None    Food insecurity     Worry: None     Inability: None    Transportation needs     Medical: None     Non-medical: None   Tobacco Use    Smoking status: Never Smoker    Smokeless tobacco: Never Used   Substance and Sexual Activity    Alcohol use: Never     Frequency: Never    Drug use: No    Sexual activity: Yes     Partners: Male   Lifestyle    Physical activity     Days per week: None     Minutes per session: None    Stress: None   Relationships    Social connections     Talks on phone: None     Gets together: None     Attends Islam service: None     Active member of club or organization: None     Attends meetings of clubs or organizations: None     Relationship status: None    Intimate partner violence Fear of current or ex partner: None     Emotionally abused: None     Physically abused: None     Forced sexual activity: None   Other Topics Concern    None   Social History Narrative    None      Allergies: Codeine and Vicodin [hydrocodone-acetaminophen]  Medications:     dextrose        insulin glargine  25 Units Subcutaneous BID    miconazole   Topical BID    zinc sulfate  50 mg Oral Daily    Vitamin D  1,000 Units Oral Daily    remdesivir IVPB  100 mg Intravenous Q24H    amLODIPine  5 mg Oral Daily    vitamin C  250 mg Oral Daily    aspirin  81 mg Oral Daily    atenolol  100 mg Oral Daily    clopidogrel  75 mg Oral Daily    pravastatin  80 mg Oral Daily    sodium chloride flush  10 mL Intravenous 2 times per day    enoxaparin  40 mg Subcutaneous BID    phosphorus replacement protocol   Other RX Placeholder    potassium replacement protocol   Other RX Placeholder    magnesium replacement protocol   Other RX Placeholder    insulin lispro  0-18 Units Subcutaneous TID WC    insulin lispro  0-9 Units Subcutaneous Nightly     Prior to Admission medications    Medication Sig Start Date End Date Taking? Authorizing Provider   insulin glargine (LANTUS) 100 UNIT/ML injection vial Inject 10 Units into the skin every morning   Yes Historical Provider, MD   clopidogrel (PLAVIX) 75 MG tablet Take 1 tablet by mouth daily 12/6/19  Yes Linda Oseguera MD   pravastatin (PRAVACHOL) 80 MG tablet Take 80 mg by mouth daily   Yes Historical Provider, MD   Ascorbic Acid (VITAMIN C) 250 MG tablet Take 250 mg by mouth daily   Yes Historical Provider, MD   amLODIPine (NORVASC) 5 MG tablet Take 5 mg by mouth daily. Yes Historical Provider, MD   atenolol (TENORMIN) 100 MG tablet Take 100 mg by mouth.  1/2 tab daily    Yes Historical Provider, MD   aspirin 81 MG chewable tablet Take 1 tablet by mouth daily 6/1/17   Pedro Franco, APRN - CNP      PHYSICAL EXAM:    BP 94/71   Pulse 83   Temp 97.6 °F (36.4 °C) (Oral) input(s): Francesca Belts in the last 72 hours. Radiology reports-images reviewed in PACS  Ct Chest Wo Contrast    Result Date: 12/14/2020  PROCEDURE: CT CHEST WO CONTRAST CLINICAL INFORMATION: 69-year-old female with shortness of breath. Positive for Covid 19. COMPARISON: CT scan 06/01/2017. TECHNIQUE: 5 mm noncontrast axial images were obtained through the chest. Sagittal and coronal reconstructions were obtained. All CT scans at this facility use dose modulation, iterative reconstruction, and/or weight-based dosing when appropriate to reduce radiation dose to as low as reasonably achievable. FINDINGS: The thyroid gland is enlarged and heterogenous in appearance. Central airways are patent. There is cardiomegaly. There is no pericardial effusion. There are coronary artery calcifications. There is mediastinal lymphadenopathy. There are some calcified atherosclerotic changes in the thoracic aorta. There is no aneurysmal dilatation. There are diffuse groundglass and nodular infiltrates throughout the lungs bilaterally predominantly in a peripheral distribution. There is no pleural effusion or pneumothorax. The liver appears hypoattenuated which may be due to fatty infiltration. Multilevel degenerative changes are seen throughout the spine. There is a right shoulder arthroplasty. Diffuse groundglass and nodular opacities throughout both lungs consistent with extensive pneumonia bilaterally. **This report has been created using voice recognition software. It may contain minor errors which are inherent in voice recognition technology. ** Final report electronically signed by Dr Dre Keller on 12/14/2020 12:48 PM    Xr Chest Portable    Result Date: 12/15/2020  PROCEDURE: XR CHEST PORTABLE CLINICAL INFORMATION: sob . COMPARISON: 12/12/2020 TECHNIQUE: Portable upright FINDINGS: Exam is limited by portable technique and body habitus. There is also reported aspiration. Heart size enlarged.  Subsegmental

## 2020-12-17 NOTE — CARE COORDINATION
12/17/20, 7:34 AM EST    DISCHARGE PLANNING EVALUATION      Late Entry    Maryann from [facility] called, patient is doing too well for TCU. Spoke with patient, discussed options of ECF vs HH. Her preference is home with spouse and [facility], nursing. , aide, PT & OT.

## 2020-12-17 NOTE — PROGRESS NOTES
[address] [address]  INPATIENT PHYSICAL THERAPY  DAILY NOTE  STRZ 6A CAPACITY EBOLA - 6A-006-A    Time In: 1100  Time Out: 1123  Timed Code Treatment Minutes: 23 Minutes  Minutes: 23          Date: 2020  Patient Name: Carol Taylor,  Gender:  female        MRN: [id number]  : 1955  (77 y.o.)     Referring Practitioner: Dr Russ Marks  Diagnosis: COVID-19  Additional Pertinent Hx: Pt amditted 12-12 from home with weakness, cough, SOB     Prior Level of Function:  Lives With: Spouse  Type of Home: House  Home Layout: One level  Home Access: Ramped entrance   Bathroom Shower/Tub: Shower chair with back, Tub/Shower unit  Bathroom Toilet: Handicap height    ADL Assistance: [address]: Needs assistance  Homemaking Responsibilities: Yes  Ambulation Assistance: Independent  Transfer Assistance: Independent  Active : Yes  Additional Comments: Pt denies use of AD at home    Restrictions/Precautions:  Restrictions/Precautions: Fall Risk, General Precautions  Position Activity Restriction  Other position/activity restrictions: covid+--  pt on 4L O2-- 12/15 room air     Vitals: WNLs On 2L O2 via NC    SUBJECTIVE: Pt. Laying in bed upon arrival. Pt. Reports increased fatigue this date, refuses OOB activities but agrees to supine ther ex. Pt. Would occasionally sit up in long sitting to adjust self in bed. PAIN: None indicated    OBJECTIVE:  Bed Mobility:  Supine to Sit: Stand By Assistance (to long sitting)  Sit to Supine: Stand By Assistance   Scooting: Stand By Assistance    Transfers:  Not Tested    Ambulation:  Not Tested    Exercise:  Patient was guided in 1 set(s) 10-15 reps of exercise to both lower extremities. Ankle pumps, Glut sets, Quad sets, Heelslides, Short arc quads, Hip abduction/adduction and Straight leg raises. Rest breaks required throughout. Exercises were completed for increased independence with functional mobility.     Functional Outcome Measures: Not completed       ASSESSMENT:  Assessment: Patient progressing toward established goals. Activity Tolerance:  Patient tolerance of  treatment: good. Equipment Recommendations: Other: cont to assess  Discharge Recommendations:  (home with spouse with 24 hour supervision and home health therapy)    Plan: Times per week: 5 X GM  Specific instructions for Next Treatment: Pts very weak, trial bariatric walker ,may need to follow with W/C if get out of room  Current Treatment Recommendations: Strengthening, Transfer Training, Endurance Training, Balance Training, Functional Mobility Training, Gait Training    Patient Education  Patient Education: Plan of Care, Verbal Exercise Instruction    Goals:  Patient goals : Go home  Short term goals  Time Frame for Short term goals: by discharge  Short term goal 1: supine to sit and return with Mod I to get in and out of bed  Short term goal 2: sit to stand with Mod I to get on andoff various surfaces  Short term goal 3: Pt will ambulate with /without AD 50 feet or more with S to walk household distances  Long term goals  Time Frame for Long term goals : NA due to short ELOS    Following session, patient left in safe position with all fall risk precautions in place.

## 2020-12-17 NOTE — CARE COORDINATION
DISASTER CHARTING    12/17/20, 7:29 AM EST    DISCHARGE ONGOING EVALUATION:     [facility] day: 5  Location: 6A-06/006-A Reason for admit: COVID-19 [U07.1]   Barriers to Discharge: Afebrile. O2 at 2L/NC and sat 95%. PT/OT working with pt. Pt not qualifiable for TCU now. SW following. PCP: PATRICIO Powell CNP  Patient Goals/Plan/Treatment Preferences: Home with [facility]. Monitor for [facility] needs. Follow for O2 needs.

## 2020-12-17 NOTE — PROGRESS NOTES
Comprehensive Nutrition Assessment    Type and Reason for Visit:  Initial(LOS)    Nutrition Recommendations/Plan:   Encouraged po, good nutrition at best efforts. Encouraged compliance with CHO controlled diet. Recommend MVI. Nutrition Assessment:    Pt. nutritionally compromised AEB wounds, poor appetite pta. At risk for further nutrition compromise r/t increased nutrient needs for wound healing, COVID+ and underlying medical condition (hx CAD, CVA, DM and GERD). Nutrition recommendations/interventions as per above. Malnutrition Assessment:  Malnutrition Status:  Insufficient data    Context:  Acute Illness       Nutrition Related Findings:  COVID+; spoke with pt. on phone; reports poor appetite and intake for a few weeks pta however states eating well now; tolerating diet; 1 BM noted past 24 hours;12/16: Glucose 223; Rx includes Insulin, Vitamin C, D, Zinc, Glycolax, Remdesivir; 11/4/20: HgbA1c 15.4%; pt.reports checking blood sugars pta - states some are in the 200's; reports reading labels to watch CHO intake      Wounds:  (excoriation and redness-breast area)       Current Nutrition Therapies:    DIET CARB CONTROL; Carb Control: 3 carb choices (45 gms)/meal    Anthropometric Measures:  · Height: 5' 8\" (172.7 cm)  · Current Body Weight: 348 lb 11.2 oz (158.2 kg)(12/12  generalized, non-pitting edema)   · Admission Body Weight: 348 lb 11.2 oz (158.2 kg)(12/12 generalized, non-pitting edema)    · Usual Body Weight: 318 lb (144.2 kg)(per pt)     · Ideal Body Weight: 140 lbs;    · BMI: 53  · BMI Categories: Obese Class 3 (BMI 40.0 or greater)       Nutrition Diagnosis:   · Inadequate oral intake related to catabolic illness, inadequate protein-energy intake as evidenced by poor intake prior to admission      Nutrition Interventions:   Food and/or Nutrient Delivery:  Continue Current Diet  Nutrition Education/Counseling:  Education initiated(12/17 Encouraged good nutrition at best efforts.   Discussed use of ONS if po intake declines again. Encouraged compliance with CHO controlled diet (limiting CHO to 45-60 grams/meal). )   Coordination of Nutrition Care:  Continue to monitor while inpatient    Goals:  Pt. will tolerate and consume 75% or more of meals during LOS.        Nutrition Monitoring and Evaluation:   Behavioral-Environmental Outcomes:  None Identified   Food/Nutrient Intake Outcomes:  Diet Advancement/Tolerance, Food and Nutrient Intake  Physical Signs/Symptoms Outcomes:  Biochemical Data, GI Status, Fluid Status or Edema, Nutrition Focused Physical Findings, Skin, Weight     Discharge Planning:    Continue current diet     Electronically signed by Suyapa Garnett RD, LD on 12/17/20 at 10:51 AM EST    Contact: [phone]

## 2020-12-17 NOTE — FLOWSHEET NOTE
Pt was contacted by phone for prayer, support and encouragement      12/17/20 0974   Encounter Summary   Services provided to: Patient   Referral/Consult From: Rounding   Continue Visiting Yes  (12/17 P)   Complexity of Encounter Low   Length of Encounter 15 minutes   Routine   Type Follow up   Assessment Approachable;Calm   Intervention Nurtured hope;Prayer;[place]; Active listening;Empowerment   Outcome Acceptance;Expressed gratitude;Encouraged; Hopeful;Receptive

## 2020-12-17 NOTE — CARE COORDINATION
12/17/20, 9:04 AM EST    DISCHARGE PLANNING EVALUATION    Call to [facility] with SR LÓPEZ-message left with referral information. Requested call back. 11:12 AM Received call from [facility] with SR LÓPEZ-confirmed that she has received referral-SW will advised her when patient is discharged.

## 2020-12-18 VITALS
DIASTOLIC BLOOD PRESSURE: 81 MMHG | OXYGEN SATURATION: 98 % | BODY MASS INDEX: 44.41 KG/M2 | RESPIRATION RATE: 29 BRPM | SYSTOLIC BLOOD PRESSURE: 115 MMHG | HEIGHT: 68 IN | WEIGHT: 293 LBS | TEMPERATURE: 98.1 F | HEART RATE: 84 BPM

## 2020-12-18 LAB
D-DIMER QUANTITATIVE: 501 NG/ML FEU (ref 0–500)
FIBRINOGEN: 417 MG/100ML (ref 155–475)
GLUCOSE BLD-MCNC: 92 MG/DL (ref 70–108)

## 2020-12-18 PROCEDURE — 97110 THERAPEUTIC EXERCISES: CPT

## 2020-12-18 PROCEDURE — 36415 COLL VENOUS BLD VENIPUNCTURE: CPT

## 2020-12-18 PROCEDURE — 6360000002 HC RX W HCPCS: Performed by: STUDENT IN AN ORGANIZED HEALTH CARE EDUCATION/TRAINING PROGRAM

## 2020-12-18 PROCEDURE — 85385 FIBRINOGEN ANTIGEN: CPT

## 2020-12-18 PROCEDURE — 97530 THERAPEUTIC ACTIVITIES: CPT

## 2020-12-18 PROCEDURE — 2580000003 HC RX 258: Performed by: STUDENT IN AN ORGANIZED HEALTH CARE EDUCATION/TRAINING PROGRAM

## 2020-12-18 PROCEDURE — 99232 SBSQ HOSP IP/OBS MODERATE 35: CPT | Performed by: INTERNAL MEDICINE

## 2020-12-18 PROCEDURE — 82948 REAGENT STRIP/BLOOD GLUCOSE: CPT

## 2020-12-18 PROCEDURE — 6370000000 HC RX 637 (ALT 250 FOR IP): Performed by: INTERNAL MEDICINE

## 2020-12-18 PROCEDURE — 6370000000 HC RX 637 (ALT 250 FOR IP): Performed by: STUDENT IN AN ORGANIZED HEALTH CARE EDUCATION/TRAINING PROGRAM

## 2020-12-18 PROCEDURE — 85379 FIBRIN DEGRADATION QUANT: CPT

## 2020-12-18 PROCEDURE — 97116 GAIT TRAINING THERAPY: CPT

## 2020-12-18 RX ORDER — ZINC SULFATE 50(220)MG
50 CAPSULE ORAL DAILY
Qty: 30 CAPSULE | Refills: 0 | COMMUNITY
Start: 2020-12-18

## 2020-12-18 RX ORDER — CHOLECALCIFEROL (VITAMIN D3) 25 MCG
1000 TABLET ORAL DAILY
Qty: 30 TABLET | Refills: 0 | Status: SHIPPED | OUTPATIENT
Start: 2020-12-18

## 2020-12-18 RX ORDER — INSULIN GLARGINE 100 [IU]/ML
25 INJECTION, SOLUTION SUBCUTANEOUS 2 TIMES DAILY
Qty: 1 VIAL | Refills: 3 | Status: SHIPPED | OUTPATIENT
Start: 2020-12-18

## 2020-12-18 RX ADMIN — ATENOLOL 100 MG: 100 TABLET ORAL at 07:45

## 2020-12-18 RX ADMIN — CLOPIDOGREL BISULFATE 75 MG: 75 TABLET ORAL at 07:45

## 2020-12-18 RX ADMIN — AMLODIPINE BESYLATE 5 MG: 5 TABLET ORAL at 07:45

## 2020-12-18 RX ADMIN — Medication 50 MG: at 07:44

## 2020-12-18 RX ADMIN — MICONAZOLE NITRATE: 20 POWDER TOPICAL at 07:45

## 2020-12-18 RX ADMIN — ASPIRIN 81 MG: 81 TABLET, CHEWABLE ORAL at 07:45

## 2020-12-18 RX ADMIN — ENOXAPARIN SODIUM 40 MG: 40 INJECTION SUBCUTANEOUS at 07:44

## 2020-12-18 RX ADMIN — OXYCODONE HYDROCHLORIDE AND ACETAMINOPHEN 250 MG: 500 TABLET ORAL at 07:45

## 2020-12-18 RX ADMIN — Medication 1000 UNITS: at 07:44

## 2020-12-18 RX ADMIN — SODIUM CHLORIDE, PRESERVATIVE FREE 10 ML: 5 INJECTION INTRAVENOUS at 07:45

## 2020-12-18 ASSESSMENT — PAIN SCALES - GENERAL
PAINLEVEL_OUTOF10: 0
PAINLEVEL_OUTOF10: 0

## 2020-12-18 NOTE — CARE COORDINATION
12/18/20, 12:09 PM EST    Patient goals/plan/ treatment preferences discussed by  and . Patient goals/plan/ treatment preferences reviewed with patient/ family. Patient/ family verbalize understanding of discharge plan and are in agreement with goal/plan/treatment preferences. Understanding was demonstrated using the teach back method. AVS provided by RN at time of discharge, which includes all necessary medical information pertaining to the patients current course of illness, treatment, post-discharge goals of care, and treatment preferences. Services After Discharge  Services At/After Discharge: [place], Nursing Services, OT, PT([facility])   IMM Letter  IMM Letter given to Patient/Family/Significant other/Guardian/POA/by[de-identified] Staci BROWN Case Manager. IMM Letter date given[de-identified] 12/17/20  IMM Letter time given[de-identified] 1440       Patient discharged to home with spouse and new [facility], nursing, PT &OT. Left message for Declan Suazo at [facility], notified of discharge. [facility] co-pay for medication.

## 2020-12-18 NOTE — CARE COORDINATION
12/18/20  12:44 PM EST    Discharge order is in. Pt does not qualify for home oxygen. Follow up appt is scheduled with pt PCP. Notified Efren MORELAND per voice mail that pt is being discharged home.

## 2020-12-18 NOTE — PROGRESS NOTES
A home oxygen evaluation has been completed. [x]Patient is an inpatient. It is expected that the patient will be discharged within the next 48 hours. Qualified provider to write order for home prescription if patient qualifies. Social service/care managers will arrange for home oxygen. If patient is active, arrange for Home Medical supplier to assess for Oxygen Conserving Device per pulse oximetry. []Patient is an outpatient. Results will be faxed to the ordering provider. Qualified provider to write order for home prescription if patient qualifies and arranges for home oxygen. Patient was placed on room air for 24 hrs SpO2 was 95 % on room air at rest. Patients SpO2 was 89% or above and did not qualify for home oxygen. Patient was walked for 6 minutes. SpO2 was 90 % during walking. Patients SpO2 was not below 89% and did not qualified for home oxygen. Actual SpO2 was 95 %. Note: For any SpO2 at 18% see policy and procedure for possible qualifications.

## 2020-12-18 NOTE — PROGRESS NOTES
Hospitalist       Patient:  Loy Smiley    Unit/Bed:6A-06/006-A  YOB: 1955  MRN: [id number]   Acct: [de-identified]     PCP: PATRICIO Glasgow CNP  Date of Admission: 12/12/2020        Assessment and Plan:        1. Acute hypoxic respiratory failure due to pneumonia secondary to COVID-19: Patient has markedly improved on 2 L nasal cannula, day 5 of remdesivir and day 6 of Decadron, continue with vitamin C, D, and zinc  2. Type 2 diabetes mellitus continue Lantus and insulin sliding scale, will modify his Lantus to twice daily dosing  3. Hypertension essential controlled continue home medications  4. Hyperlipidemia controlled continue with statin therapy    Consider discharge to home after completion of remdesivir, need home health and possibly home oxygen therapy. Possible discharge 12/18/2020 will need home oxygen if patient is not on room air. 12.18.2020 patient medically stable, may need home oxygen for ambulation. CC: Weakness cough, diarrhea    HPI: 78-year-old female presents emergency department with complaint of bilateral lower extremity weakness of 5-day duration generalized fatigue. Has a past medical's history of coronary artery disease status post angioplasty to the right SFA, PAD with claudication, history of CVA, cryptogenic stroke, hypertension, hyperlipidemia, spinal stenosis, spondylolisthesis, shortness of breath on exertion and chronic lower extremity edema, morbid obesity. Patient has been experiencing nonproductive cough that started 5 days ago with associated chest pain or shortness of breath at rest.  She is has been on bloody watery diarrhea without any mucus. She denies fevers, or chills,    ROS (14 point review of systems completed. Pertinent positives noted. Otherwise ROS is negative) :  Shortness of breath, cough, lower extremity weakness,    PMH:  Per HPI and       Diagnosis Date    CAD (coronary artery disease)     Degenerative lumbar spinal stenosis 1/20/2015    GERD (gastroesophageal reflux disease)     Gout     History of MI (myocardial infarction) 2004    History of stroke     Hyperlipidemia     Hypertension     Morbid obesity with body mass index of 50.0-59.9 in adult [facility])     Spondylolisthesis of lumbar region 1/20/2015    Bilateral L5 pars defects.     Unspecified cerebral artery occlusion with cerebral infarction 2010     SHX:        Procedure Laterality Date    BACK SURGERY      COLONOSCOPY      ENDOSCOPY, COLON, DIAGNOSTIC      JOINT REPLACEMENT Bilateral     knees    KNEE SURGERY  2006    x2    ID ECHO TRANSESOPHAG R-T 2D IMG ACQUISJ I&R ONLY Left 6/8/2018    TRANSESOPHAGEAL ECHOCARDIOGRAM performed by Xuan Vaca MD at [address] Right 06/2019    x3    VASCULAR SURGERY      left carotid     FHX:       Problem Relation Age of Onset    Stroke Mother     Heart Disease Father     Heart Attack Father     Cancer Sister    [place] Cancer Sister     Diabetes Neg Hx      SOCHX:   Social History     Socioeconomic History    Marital status:      Spouse name: Xuan Vaca Number of children: 3    Years of education: None    Highest education level: None   Occupational History    Occupation: disability   Social Needs    Financial resource strain: None    Food insecurity     Worry: None     Inability: None    Transportation needs     Medical: None     Non-medical: None   Tobacco Use    Smoking status: Never Smoker    Smokeless tobacco: Never Used   Substance and Sexual Activity    Alcohol use: Never     Frequency: Never    Drug use: No    Sexual activity: Yes     Partners: Male   Lifestyle    Physical activity     Days per week: None     Minutes per session: None    Stress: None   Relationships    Social connections     Talks on phone: None     Gets together: None     Attends Alevism service: None     Active member of club or organization: None     Attends meetings of clubs or Pulse 85   Temp 97.9 °F (36.6 °C) (Oral)   Resp 26   Ht 5' 8\" (1.727 m)   Wt (!) 348 lb 10.8 oz (158.2 kg)   SpO2 97%   BMI 53.02 kg/m²     General appearance:  No apparent distress, appears stated age and cooperative. HEENT:  Normal cephalic, atraumatic without obvious deformity. Pupils equal, round, and reactive to light. Extra ocular muscles intact. Conjunctivae/corneas clear. Neck: Supple, with full range of motion. no jugular venous distention. Trachea midline. no carotid bruits  Respiratory:  Normal respiratory effort. Clear to auscultation, bilaterally without Rales/Wheezes/Rhonchi. Breath sounds equal bilaterally  Cardiovascular:  Regular rate and rhythm with normal S1/S2 without murmurs, rubs or gallops. PMI non displaced  Abdomen: Soft, non-tender, non-distended with normal bowel sounds. No guarding, rebound. Musculoskeletal:  No clubbing, cyanosis or edema bilaterally. Full range of motion without deformity. Skin: Skin color, texture, turgor normal.  No rashes or lesions, or suspicious lesions. Neurologic:  Neurovascularly intact without any focal sensory/motor deficits. Cranial nerves: II-XII intact, grossly non-focal.  Psychiatric:  Alert and oriented, thought content appropriate, normal insight  Capillary Refill: Brisk,< 2 seconds   Peripheral Pulses: +2 palpable, equal bilaterally upper and lower extremities  Lymphatics: no lymphadenopathy    Data: (All radiographs, tracings, PFTs, and imaging are personally viewed and interpreted unless otherwise noted).  Labs pending  Recent Labs     12/16/20  0707   WBC 6.8   HGB 16.7*   HCT 51.5*        Recent Labs     12/16/20  0840      K 4.0      CO2 23   BUN 16   CREATININE 0.6   CALCIUM 9.0     Recent Labs     12/16/20  0840 12/17/20  0643   AST 17 21   ALT 15 21   BILIDIR <0.2 <0.2   BILITOT 0.4 0.5   ALKPHOS 46 46     No results for input(s): INR in the last 72 hours.   No results for input(s): Kristan Susan in the last 72 hours. Radiology reports-images reviewed in PACS  Ct Chest Wo Contrast    Result Date: 12/14/2020  PROCEDURE: CT CHEST WO CONTRAST CLINICAL INFORMATION: 49-year-old female with shortness of breath. Positive for Covid 19. COMPARISON: CT scan 06/01/2017. TECHNIQUE: 5 mm noncontrast axial images were obtained through the chest. Sagittal and coronal reconstructions were obtained. All CT scans at this facility use dose modulation, iterative reconstruction, and/or weight-based dosing when appropriate to reduce radiation dose to as low as reasonably achievable. FINDINGS: The thyroid gland is enlarged and heterogenous in appearance. Central airways are patent. There is cardiomegaly. There is no pericardial effusion. There are coronary artery calcifications. There is mediastinal lymphadenopathy. There are some calcified atherosclerotic changes in the thoracic aorta. There is no aneurysmal dilatation. There are diffuse groundglass and nodular infiltrates throughout the lungs bilaterally predominantly in a peripheral distribution. There is no pleural effusion or pneumothorax. The liver appears hypoattenuated which may be due to fatty infiltration. Multilevel degenerative changes are seen throughout the spine. There is a right shoulder arthroplasty. Diffuse groundglass and nodular opacities throughout both lungs consistent with extensive pneumonia bilaterally. **This report has been created using voice recognition software. It may contain minor errors which are inherent in voice recognition technology. ** Final report electronically signed by Dr Kiran Childs on 12/14/2020 12:48 PM    Xr Chest Portable    Result Date: 12/15/2020  PROCEDURE: XR CHEST PORTABLE CLINICAL INFORMATION: sob . COMPARISON: 12/12/2020 TECHNIQUE: Portable upright FINDINGS: Exam is limited by portable technique and body habitus. There is also reported aspiration. Heart size enlarged. Subsegmental atelectasis left base. No effusions are seen. The pulmonary vascularity is not increased. Prior right shoulder replacement. EKG leads overlie the chest. Implanted cardiac monitor left chest     Subsegmental atelectasis left base. Cardiomegaly. **This report has been created using voice recognition software. It may contain minor errors which are inherent in voice recognition technology. ** Final report electronically signed by Dr. Alexandru Rodriguez on 12/15/2020 10:11 AM    Xr Chest Portable    Result Date: 12/12/2020  Exam: 1V chest Comparison:  CR  - CHEST MOBILE SINGLE  - 05/31/2017 04:55 PM EDT Findings: Mediastinum: Stable moderate cardiac silhouette enlargement. Lungs: Streaky bilateral perihilar and basilar consolidations concerning for atypical infiltrates, new vs prior. Pleura: No pneumothorax or significant effusion. Bones: No acute pathology. Right shoulder arthroplasty. Impression: Streaky bilateral perihilar and basilar consolidations concerning for atypical infiltrates, new vs prior. This document has been electronically signed by:  Margot Pathak MD on 12/12/2020 05:21 AM       Electronically signed by Cris Bridges DO on 12/18/2020 at 10:08 AM

## 2020-12-18 NOTE — PROGRESS NOTES
Discharge teaching and instructions for diagnosis/procedure of COVID-19 completed with patient using teachback method. AVS reviewed. Printed prescriptions given to patient. Patient voiced understanding regarding prescriptions, follow up appointments, and care of self at home. Discharged in a wheelchair to  home with support per family. Meds to Beds delivered.

## 2020-12-18 NOTE — PROGRESS NOTES
containers. ADDITIONAL ACTIVITIES:  Issued handout and yellow band. Patient identified a personal goal to increase UB strength and improve overall endurance so they can complete their toilet & shower transfers; skilled edu on UE strengthening and patient completed BUE strengthening exercises x10 reps x1 set this date with a resistive band in all joints and all planes. Patient tolerated well. ASSESSMENT:     Activity Tolerance:  Patient tolerance of  treatment: good. Discharge Recommendations: Continue to assess pending progress   Equipment Recommendations: Equipment Needed: Yes(possibly RW)  Plan: Times per week: 5x  Times per day: Daily  Current Treatment Recommendations: Balance Training, Functional Mobility Training, Endurance Training, Patient/Caregiver Education & Training, Home Management Training, Self-Care / ADL    Patient Education  Patient Education: Home Exercise Program    Goals  Short term goals  Time Frame for Short term goals: by d/c  Short term goal 1: Pt will complete functional mobiltiy short HH distances with SBA and LRAE for increased indep wtih ADLs  Short term goal 2: Pt will tolerate dynamic standing > 3 min with SBA and min VC for safety to increase indep with grooming  Short term goal 3: Pt will complete various t/fs with SBA and no safety cues to increase indep with toileting  Short term goal 4: Pt will complete BUE strengthening HEP to increase indep with ADLs  Long term goals  Time Frame for Long term goals : no LTG d/t short ELOS    Following session, patient left in safe position with all fall risk precautions in place.

## 2020-12-18 NOTE — PROGRESS NOTES
set(s) 10 reps of exercise to both lower extremities. Glut sets, Hip abduction/adduction, Seated marches, Seated heel/toe raises and Long arc quads. Exercises were completed for increased independence with functional mobility. Functional Outcome Measures: Not completed       ASSESSMENT:  Assessment: Patient progressing toward established goals. Activity Tolerance:  Patient tolerance of  treatment: good. Equipment Recommendations: Other: cont to assess  Discharge Recommendations:  (home with spouse with 24 hour supervision and home health therapy)    Plan: Times per week: 5 X GM  Specific instructions for Next Treatment: Pts very weak, trial bariatric walker ,may need to follow with W/C if get out of room  Current Treatment Recommendations: Strengthening, Transfer Training, Endurance Training, Balance Training, Functional Mobility Training, Gait Training    Patient Education  Patient Education: Plan of Care, Transfers, Reviewed Prior Education, Gait, Verbal Exercise Instruction    Goals:  Patient goals : Go home  Short term goals  Time Frame for Short term goals: by discharge  Short term goal 1: supine to sit and return with Mod I to get in and out of bed  Short term goal 2: sit to stand with Mod I to get on andoff various surfaces  Short term goal 3: Pt will ambulate with /without AD 50 feet or more with S to walk household distances  Long term goals  Time Frame for Long term goals : NA due to short ELOS    Following session, patient left in safe position with all fall risk precautions in place.

## 2020-12-23 NOTE — PROGRESS NOTES
CLINICAL PHARMACY NOTE: MEDS TO [address] Select Patient?: No  Total # of Prescriptions Filled: 5   The following medications were delivered to the patient:  Micro Guard 2% powder  Lantus 100unit/ml  Vitamin D3-25 mcg  Insulin Syringe   Novolog   Total # of Interventions Completed: 2  Time Spent (min): 30    Additional Documentation:

## 2021-01-05 ENCOUNTER — TELEPHONE (OUTPATIENT)
Dept: CARDIOLOGY CLINIC | Age: 66
End: 2021-01-05

## 2021-01-05 ENCOUNTER — HOSPITAL ENCOUNTER (OUTPATIENT)
Dept: GENERAL RADIOLOGY | Age: 66
Discharge: HOME | End: 2021-01-05
Payer: COMMERCIAL

## 2021-01-05 ENCOUNTER — HOSPITAL ENCOUNTER (OUTPATIENT)
Age: 66
Discharge: HOME | End: 2021-01-05
Payer: COMMERCIAL

## 2021-01-05 DIAGNOSIS — J18.9 PNEUMONIA DUE TO INFECTIOUS ORGANISM, UNSPECIFIED LATERALITY, UNSPECIFIED PART OF LUNG: ICD-10-CM

## 2021-01-05 PROCEDURE — 71046 X-RAY EXAM CHEST 2 VIEWS: CPT

## 2021-01-06 ENCOUNTER — PROCEDURE VISIT (OUTPATIENT)
Dept: CARDIOLOGY CLINIC | Age: 66
End: 2021-01-06
Payer: COMMERCIAL

## 2021-01-06 DIAGNOSIS — I63.9 CRYPTOGENIC STROKE (HCC): Primary | ICD-10-CM

## 2021-01-06 PROCEDURE — 93298 REM INTERROG DEV EVAL SCRMS: CPT | Performed by: INTERNAL MEDICINE

## 2021-01-06 PROCEDURE — G2066 INTER DEVC REMOTE 30D: HCPCS | Performed by: INTERNAL MEDICINE

## 2021-01-06 NOTE — PROGRESS NOTES
DR Mirlande Perkins PT   REVEAL BATTERY OK  FALSE PAUSES NOTED  FALSE AFIB/ NSR WITH PACS  DID HAVE A COUPLE OF EPISODES OF LOOKS LIKE SVT  BPM

## 2021-01-07 NOTE — PROGRESS NOTES
Device Interrogation Reviewed.   False pause and atrial FIB  Freq PACs noted  Two episodes of  nonsustained SVT - one last 6 sec at rate 194 bpm, and 11 sec at rate 200 bpm, both dec 10, 2020

## 2021-01-17 NOTE — DISCHARGE SUMMARY
edema, morbid obesity. Patient has been experiencing nonproductive cough that started 5 days ago with associated chest pain or shortness of breath at rest.  She is has been on bloody watery diarrhea without any mucus. She denies fevers, or chills      Exam:     Vitals:  Vitals:    12/18/20 0345 12/18/20 0600 12/18/20 0730 12/18/20 1236   BP: 132/74  127/68 115/81   Pulse: 99  85 84   Resp: 22  26 29   Temp: 97.8 °F (36.6 °C)  97.9 °F (36.6 °C) 98.1 °F (36.7 °C)   TempSrc: Oral  Oral Oral   SpO2: 95%  97% 98%   Weight:  (!) 348 lb 10.8 oz (158.2 kg)     Height:         Weight: Weight: (!) 348 lb 10.8 oz (158.2 kg)     24 hour intake/output:No intake or output data in the 24 hours ending 01/17/21 1300      General appearance:  No apparent distress, appears stated age and cooperative. HEENT:  Normal cephalic, atraumatic without obvious deformity. Pupils equal, round, and reactive to light. Extra ocular muscles intact. Conjunctivae/corneas clear. Neck: Supple, with full range of motion. No jugular venous distention. Trachea midline. Respiratory:  Normal respiratory effort. Clear to auscultation, bilaterally without Rales/Wheezes/Rhonchi. Cardiovascular:  Regular rate and rhythm with normal S1/S2 without murmurs, rubs or gallops. Abdomen: Soft, non-tender, non-distended with normal bowel sounds. Musculoskeletal:  No clubbing, cyanosis or edema bilaterally. Full range of motion without deformity. Skin: Skin color, texture, turgor normal.  No rashes or lesions. Neurologic:  Neurovascularly intact without any focal sensory/motor deficits. Cranial nerves: II-XII intact, grossly non-focal.  Psychiatric:  Alert and oriented, thought content appropriate, normal insight  Capillary Refill: Brisk,< 3 seconds   Peripheral Pulses: +2 palpable, equal bilaterally       Labs:  For convenience and continuity at follow-up the following most recent labs are provided:      CBC:    Lab Results   Component Value Date    WBC 6.8 12/16/2020    HGB 16.7 12/16/2020    HCT 51.5 12/16/2020     12/16/2020       Renal:    Lab Results   Component Value Date     12/16/2020    K 4.0 12/16/2020    K 4.4 12/13/2020     12/16/2020    CO2 23 12/16/2020    BUN 16 12/16/2020    CREATININE 0.6 12/16/2020    CALCIUM 9.0 12/16/2020    PHOS 2.5 12/12/2020         Significant Diagnostic Studies    Radiology:   XR CHEST PORTABLE   Final Result   Subsegmental atelectasis left base. Cardiomegaly. **This report has been created using voice recognition software. It may contain minor errors which are inherent in voice recognition technology. **      Final report electronically signed by Dr. Faizan Steiner on 12/15/2020 10:11 AM      CT CHEST WO CONTRAST   Final Result   Diffuse groundglass and nodular opacities throughout both lungs consistent with extensive pneumonia bilaterally. **This report has been created using voice recognition software. It may contain minor errors which are inherent in voice recognition technology. **      Final report electronically signed by Dr Amish Sosa on 12/14/2020 12:48 PM      XR CHEST PORTABLE   Final Result   Impression:   Streaky bilateral perihilar and basilar consolidations concerning for    atypical infiltrates, new vs prior. This document has been electronically signed by: Heidi Lora MD on    12/12/2020 05:21 AM                Consults:     IP CONSULT TO SPIRITUAL SERVICES  IP CONSULT TO PHARMACY  IP CONSULT TO REHAB/TCU ADMISSION COORDINATOR  IP CONSULT TO SOCIAL WORK  IP CONSULT TO HOME CARE NEEDS  IP CONSULT TO HOME CARE NEEDS    Disposition:    [] Home       [] TCU       [] Rehab       [] Psych       [] SNF       [] [place]       [] Other-    Condition at Discharge: Stable    Code Status:  Prior     Patient Instructions:    Discharge lab work:    Activity: activity as tolerated  Diet: No diet orders on file      Follow-up visits:   [facility]  [phone]          Bran Fletcher, APRN - [facility]  [address] [address]  [phone]    Schedule an appointment as soon as possible for a visit on 1/4/2021  See Arsalan Summers Daily on Mon Jan 4 at 1:45. Discharge Medications:      Leladarielmariela Finneganfan   Home Medication Instructions ZBL:[id number]    Printed on:01/17/21 1300   Medication Information                      amLODIPine (NORVASC) 5 MG tablet  Take 5 mg by mouth daily. Ascorbic Acid (VITAMIN C) 250 MG tablet  Take 250 mg by mouth daily             aspirin 81 MG chewable tablet  Take 1 tablet by mouth daily             atenolol (TENORMIN) 100 MG tablet  Take 100 mg by mouth. 1/2 tab daily              Cholecalciferol (VITAMIN D) 25 MCG TABS  Take 1 tablet by mouth daily             clopidogrel (PLAVIX) 75 MG tablet  Take 1 tablet by mouth daily             insulin glargine (LANTUS) 100 UNIT/ML injection vial  Inject 25 Units into the skin 2 times daily             insulin lispro (HUMALOG) 100 UNIT/ML injection vial  Inject 0-18 Units into the skin 3 times daily (with meals)             insulin lispro (HUMALOG) 100 UNIT/ML injection vial  Inject 0-9 Units into the skin nightly             miconazole (MICOTIN) 2 % powder  Apply topically 2 times daily. pravastatin (PRAVACHOL) 80 MG tablet  Take 80 mg by mouth daily             zinc sulfate (ZINCATE) 220 (50 Zn) MG capsule  Take 1 capsule by mouth daily                 Time Spent on discharge is more than 45 minutes in the examination, evaluation, counseling and review of medications and discharge plan.     Consider discharge to home after completion of remdesivir, need home health and possibly home oxygen therapy.     Possible discharge 12/18/2020 will need home oxygen if patient is not on room air.     12.18.2020 patient medically stable, may need home oxygen for ambulation.     CC: Weakness cough, diarrhea     HPI: 68-year-old female presents emergency department with complaint of bilateral lower extremity weakness of 5-day duration generalized fatigue. Has a past medical's history of coronary artery disease status post angioplasty to the right SFA, PAD with claudication, history of CVA, cryptogenic stroke, hypertension, hyperlipidemia, spinal stenosis, spondylolisthesis, shortness of breath on exertion and chronic lower extremity edema, morbid obesity. Patient has been experiencing nonproductive cough that started 5 days ago with associated chest pain or shortness of breath at rest.  She is has been on bloody watery diarrhea without any mucus. She denies fevers, or chills          Signed: Thank you PATRICIO Parnell CNP for the opportunity to be involved in this patient's care.     Electronically signed by Myriam Oliva DO on 1/17/2021 at 1:00 PM

## 2021-02-10 ENCOUNTER — TELEPHONE (OUTPATIENT)
Dept: CARDIOLOGY CLINIC | Age: 66
End: 2021-02-10

## 2021-02-11 ENCOUNTER — PROCEDURE VISIT (OUTPATIENT)
Dept: CARDIOLOGY CLINIC | Age: 66
End: 2021-02-11
Payer: COMMERCIAL

## 2021-02-11 DIAGNOSIS — I63.9 CRYPTOGENIC STROKE (HCC): Primary | ICD-10-CM

## 2021-02-11 PROCEDURE — G2066 INTER DEVC REMOTE 30D: HCPCS | Performed by: INTERNAL MEDICINE

## 2021-02-11 PROCEDURE — 93298 REM INTERROG DEV EVAL SCRMS: CPT | Performed by: INTERNAL MEDICINE

## 2021-02-11 NOTE — PROGRESS NOTES
DR MEEKS PT   REVEAL BATTERY OK  PT IS HAVING SOME TWAVE OVERSENSING  PT IS HAVING FALSE PAUSES  PT IS HAVING FALSE AFIB. LOOKS MORE LIKE NSR WITH PACS AND PVCS

## 2021-02-12 ENCOUNTER — TELEPHONE (OUTPATIENT)
Dept: CARDIOLOGY CLINIC | Age: 66
End: 2021-02-12

## 2021-02-12 NOTE — PROGRESS NOTES
Device Interrogation Reviewed. Indication was cryptogenic CVA  False pause   I do not see any  definite atrial FIB  It is Irregular appears to me due to Freq PACs noted some times in bigminy pattern and occasional pvcs noted      I would like DR. Astorga's opinion -   Please forward to dr. Leonie Meza for review  Thank you

## 2021-02-12 NOTE — TELEPHONE ENCOUNTER
----- Message from Calvin Cardona MD sent at 2/11/2021  7:11 PM EST -----      ----- Message -----  From: Gem Corona LPN  Sent: 7/98/9251  11:47 AM EST  To:  Calvin Cardona MD

## 2021-02-12 NOTE — TELEPHONE ENCOUNTER
DR Yeung Dear, DR Tyrell Wu WOULD LIKE YOUR OPINION ON THIS DOWNLOAD. THANKS                         Notes  Myles Pittman MD (Physician) [facility]     Device Interrogation Reviewed. Indication was cryptogenic CVA  False pause   I do not see any  definite atrial FIB  It is Irregular appears to me due to Freq PACs noted some times in bigminy pattern and occasional pvcs noted        I would like DR. Astorga's opinion -   Please forward to dr. Trung Russell for review  Thank you

## 2021-02-24 ENCOUNTER — HOSPITAL ENCOUNTER (OUTPATIENT)
Age: 66
Setting detail: SPECIMEN
Discharge: HOME | End: 2021-02-24
Payer: COMMERCIAL

## 2021-02-24 LAB
FERRITIN: 1005 UG/L (ref 13–150)
IRON SATURATION: 31 % (ref 20–55)
IRON: 77 UG/DL (ref 37–145)
TOTAL IRON BINDING CAPACITY: 250 UG/DL (ref 250–450)
UNSATURATED IRON BINDING CAPACITY: 173 UG/DL (ref 112–347)
URIC ACID: 1.7 MG/DL (ref 2.4–5.7)

## 2021-03-09 ENCOUNTER — HOSPITAL ENCOUNTER (OUTPATIENT)
Dept: CT IMAGING | Age: 66
Discharge: HOME | End: 2021-03-09
Payer: COMMERCIAL

## 2021-03-09 ENCOUNTER — OFFICE VISIT (OUTPATIENT)
Dept: CARDIOLOGY CLINIC | Age: 66
End: 2021-03-09
Payer: COMMERCIAL

## 2021-03-09 VITALS
WEIGHT: 293 LBS | SYSTOLIC BLOOD PRESSURE: 136 MMHG | HEIGHT: 68 IN | HEART RATE: 68 BPM | BODY MASS INDEX: 44.41 KG/M2 | DIASTOLIC BLOOD PRESSURE: 75 MMHG

## 2021-03-09 DIAGNOSIS — J18.9 PNEUMONIA DUE TO INFECTIOUS ORGANISM, UNSPECIFIED LATERALITY, UNSPECIFIED PART OF LUNG: ICD-10-CM

## 2021-03-09 DIAGNOSIS — I10 ESSENTIAL HYPERTENSION: ICD-10-CM

## 2021-03-09 DIAGNOSIS — E78.00 PURE HYPERCHOLESTEROLEMIA: Primary | ICD-10-CM

## 2021-03-09 DIAGNOSIS — R09.89 OTHER SPECIFIED SYMPTOMS AND SIGNS INVOLVING THE CIRCULATORY AND RESPIRATORY SYSTEMS: ICD-10-CM

## 2021-03-09 DIAGNOSIS — U07.1 COVID-19: ICD-10-CM

## 2021-03-09 DIAGNOSIS — R60.0 BILATERAL LEG EDEMA: ICD-10-CM

## 2021-03-09 DIAGNOSIS — Z98.62 S/P ANGIOPLASTY: ICD-10-CM

## 2021-03-09 DIAGNOSIS — E66.01 MORBID OBESITY DUE TO EXCESS CALORIES (HCC): ICD-10-CM

## 2021-03-09 PROCEDURE — G8400 PT W/DXA NO RESULTS DOC: HCPCS | Performed by: INTERNAL MEDICINE

## 2021-03-09 PROCEDURE — 3017F COLORECTAL CA SCREEN DOC REV: CPT | Performed by: INTERNAL MEDICINE

## 2021-03-09 PROCEDURE — 71250 CT THORAX DX C-: CPT

## 2021-03-09 PROCEDURE — 1036F TOBACCO NON-USER: CPT | Performed by: INTERNAL MEDICINE

## 2021-03-09 PROCEDURE — 1123F ACP DISCUSS/DSCN MKR DOCD: CPT | Performed by: INTERNAL MEDICINE

## 2021-03-09 PROCEDURE — G8484 FLU IMMUNIZE NO ADMIN: HCPCS | Performed by: INTERNAL MEDICINE

## 2021-03-09 PROCEDURE — 4040F PNEUMOC VAC/ADMIN/RCVD: CPT | Performed by: INTERNAL MEDICINE

## 2021-03-09 PROCEDURE — G8417 CALC BMI ABV UP PARAM F/U: HCPCS | Performed by: INTERNAL MEDICINE

## 2021-03-09 PROCEDURE — 99214 OFFICE O/P EST MOD 30 MIN: CPT | Performed by: INTERNAL MEDICINE

## 2021-03-09 PROCEDURE — G8427 DOCREV CUR MEDS BY ELIG CLIN: HCPCS | Performed by: INTERNAL MEDICINE

## 2021-03-09 PROCEDURE — 1090F PRES/ABSN URINE INCON ASSESS: CPT | Performed by: INTERNAL MEDICINE

## 2021-03-09 RX ORDER — GLIMEPIRIDE 2 MG/1
2 TABLET ORAL 2 TIMES DAILY
COMMUNITY

## 2021-03-09 RX ORDER — SPIRONOLACTONE 25 MG/1
25 TABLET ORAL DAILY
COMMUNITY

## 2021-03-09 RX ORDER — LOSARTAN POTASSIUM 50 MG/1
50 TABLET ORAL DAILY
COMMUNITY

## 2021-03-09 RX ORDER — POTASSIUM CHLORIDE 750 MG/1
10 TABLET, FILM COATED, EXTENDED RELEASE ORAL DAILY
COMMUNITY

## 2021-03-09 RX ORDER — ATORVASTATIN CALCIUM 40 MG/1
40 TABLET, FILM COATED ORAL DAILY
COMMUNITY

## 2021-03-09 RX ORDER — CAPSAICIN 0.07 G/100G
CREAM TOPICAL 3 TIMES DAILY
COMMUNITY

## 2021-03-09 RX ORDER — COLCHICINE 0.6 MG/1
0.6 TABLET ORAL DAILY
COMMUNITY

## 2021-03-09 RX ORDER — CETIRIZINE HYDROCHLORIDE 10 MG/1
10 TABLET ORAL DAILY
COMMUNITY

## 2021-03-09 NOTE — PROGRESS NOTES
Chief Complaint   Patient presents with    New Patient   Former pat of Dr. Vincent Medina  Patient with past medical history of HTN, dyslipidemia is here for the follow-up after hospital discharge. She was admitted with TIA. Negative neuro work up. No ASD or PFO. ILR was inserted for arrhythmias assessment in view of CVA      New patient last seen 2/2019. EKG done 12/12/2020. Denied cp, palpitations, edema or dizziness     No intermittent Claudication    Sob on exertion    On wheelchair      Patient Active Problem List   Diagnosis    Cryptogenic stroke ([address]    Spondylolisthesis of lumbar region    Degenerative lumbar spinal stenosis    Morbid obesity due to excess calories ([address]    History of CVA (cerebrovascular accident)    Postoperative anemia due to acute blood loss    Hypertension    Diabetes mellitus ([address]    Episodic confusion    Spinal stenosis, lumbar region, without neurogenic claudication    Diabetes mellitus type 2 in obese (HCC)    Shortness of breath    Acute CVA (cerebrovascular accident) ([address]    Paresthesia    Bilateral leg edema +1 chronic    SOB (shortness of breath) on exertion    Claudication in peripheral vascular disease (HCC)    Numbness    S/P angioplasty with Balloon of the RT SFA now 2019    Pure hypercholesterolemia    COVID-19       Past Surgical History:   Procedure Laterality Date    BACK SURGERY      COLONOSCOPY      ENDOSCOPY, COLON, DIAGNOSTIC      JOINT REPLACEMENT Bilateral     knees    KNEE SURGERY  2006    x2    DC ECHO TRANSESOPHAG R-T 2D IMG ACQUISJ I&R ONLY Left 6/8/2018    TRANSESOPHAGEAL ECHOCARDIOGRAM performed by Edith Gasca MD at [address] Right 06/2019    x3    VASCULAR SURGERY      left carotid       Allergies   Allergen Reactions    Lisinopril      Other reaction(s): cough    Codeine Nausea And Vomiting     Patient states it can make her vomit.       Vicodin [Hydrocodone-Acetaminophen] Nausea And Vomiting        Family History   Problem Relation Age of Onset   Nadya Andrew Stroke Mother     Heart Disease Father     Heart Attack Father     Cancer Sister     Cancer Sister     Diabetes Neg Hx         Social History     Socioeconomic History    Marital status:      Spouse name: Apryl Smith Number of children: 3    Years of education: Not on file    Highest education level: Not on file   Occupational History    Occupation: disability   Social Needs    Financial resource strain: Not on file    Food insecurity     Worry: Not on file     Inability: Not on file   [employer] needs     Medical: Not on file     Non-medical: Not on file   Tobacco Use    Smoking status: Never Smoker    Smokeless tobacco: Never Used   Substance and Sexual Activity    Alcohol use: Never     Frequency: Never    Drug use: No    Sexual activity: Yes     Partners: Male   Lifestyle    Physical activity     Days per week: Not on file     Minutes per session: Not on file    Stress: Not on file   Relationships    Social connections     Talks on phone: Not on file     Gets together: Not on file     Attends Congregational service: Not on file     Active member of club or organization: Not on file     Attends meetings of clubs or organizations: Not on file     Relationship status: Not on file    Intimate partner violence     Fear of current or ex partner: Not on file     Emotionally abused: Not on file     Physically abused: Not on file     Forced sexual activity: Not on file   Other Topics Concern    Not on file   Social History Narrative    Not on file       Current Outpatient Medications   Medication Sig Dispense Refill    cetirizine (ZYRTEC) 10 MG tablet Take 10 mg by mouth daily      capsicum (ZOSTRIX) 0.075 % topical cream Apply topically 3 times daily Apply topically 3 times daily.       spironolactone (ALDACTONE) 25 MG tablet Take 25 mg by mouth daily      losartan (COZAAR) 50 MG tablet Take 50 mg by mouth daily weight (!) 343 lb 4.8 oz (155.7 kg). Physical Examination:    General appearance - alert, well appearing, and in no distress  Mental status - alert, oriented to person, place, and time  Neck - supple, no significant adenopathy, no JVD, or carotid bruits  Chest - clear to auscultation, no wheezes, rales or rhonchi, symmetric air entry  Heart - normal rate, regular rhythm, normal S1, S2, no murmurs, rubs, clicks or gallops  Abdomen - soft, nontender, nondistended, no masses or organomegaly  Neurological - alert, oriented, normal speech, no focal findings or movement disorder noted  Musculoskeletal - no joint tenderness, deformity or swelling  Extremities - peripheral pulses normal, no pedal edema, no clubbing or cyanosis  Skin - normal coloration and turgor, no rashes, no suspicious skin lesions noted    Lab  No results for input(s): CKTOTAL, CKMB, CKMBINDEX, TROPONINI in the last 72 hours.   CBC:   Lab Results   Component Value Date    WBC 6.8 12/16/2020    RBC 6.22 12/16/2020    HGB 16.7 12/16/2020    HCT 51.5 12/16/2020    MCV 82.8 12/16/2020    MCH 26.8 12/16/2020    MCHC 32.4 12/16/2020    RDW 14.5 02/11/2020     12/16/2020    MPV 11.1 12/16/2020     BMP:    Lab Results   Component Value Date     12/16/2020    K 4.0 12/16/2020    K 4.4 12/13/2020     12/16/2020    CO2 23 12/16/2020    BUN 16 12/16/2020    LABALBU 3.0 12/17/2020    CREATININE 0.6 12/16/2020    CALCIUM 9.0 12/16/2020    GFRAA >60 02/11/2020    LABGLOM >90 12/16/2020    GLUCOSE 223 12/16/2020     Hepatic Function Panel:    Lab Results   Component Value Date    ALKPHOS 46 12/17/2020    ALT 21 12/17/2020    AST 21 12/17/2020    PROT 5.5 12/17/2020    BILITOT 0.5 12/17/2020    BILIDIR <0.2 12/17/2020    LABALBU 3.0 12/17/2020     Magnesium:    Lab Results   Component Value Date    MG 2.2 12/15/2020     Warfarin PT/INR:  No components found for: PTPATWAR, PTINRWAR  HgBA1c:    Lab Results   Component Value Date    LABA1C 15.4 11/04/2020     FLP:    Lab Results   Component Value Date    TRIG 90 02/11/2020    HDL 54 02/11/2020    LDLCALC 90 06/07/2018     TSH:    Lab Results   Component Value Date    TSH 2.16 02/11/2020       ekg 10/9/18  NSR, freq PACs    4/10/19  Nsr, FREQ PACS    ILR- INTERROGATION DONE 4/1/19  NO ATR FIB RATHER nsr WITH FREQ PACS  nO ATR FIB NOTED         Even though the ABIs are within normal limits bilaterally, there is a significant drop off in pulsatility in both popliteal arteries and trifurcation vessels bilaterally, suggesting significant stenosis both distal SFA regions.               This report has been created using voice recognition software.  It may contain minor errors which are inherent in voice recognition technology.       Final report electronically signed by Dr. 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 Mild  atheromatous disease along the length of the vessels.     COMMON ILIAC ARTERIES:  Bilaterally patent.     EXTERNAL ILIAC ARTERIES:  Bilaterally patent.     COMMON FEMORAL ARTERIES:  Bilaterally patent.     SFA:  80% to 90% right SFA concerning stenosis throughout the mid SFA. Left SFA has mild calcification as well as about 30 to 40% stenosis in  mid segment.     POPLITEAL ARTERY:  Left popliteal artery although patent has about 50%  stenosis. There is right 40% popliteal artery stenosis.     TIBIOPERONEAL ARTERIES:  Anterior tibial artery and posterior tibial  artery are bilaterally patent. The left PT is diffusely diseased. The  peroneal artery is patent.     PEDAL ARTERIES:  Two vessel runoff to both feet.         SUMMARY:  Successful right SFA angioplasty with a 6 x 120 Hamilton  balloon.     PLAN:  1. Bedrest.  2.  Optimal medical therapy. 3.  Risk factor management. 4.  Routine access site care. 5.  DAPT. 6.  Risk factor management. 7.  Continued surveillance of the left lower extremity.   8.  Any repeat intervention would require pedal approach or groin access  due to equipment length.     All the above were explained to the patient and the patient's family. They were agreeable and amenable to the above plan.        Laveta Apley, MD     D: 11/13/2019 5:47:    ekg 12/6/19  Sinus  Rhythm  - frequent PAC s   # PACs = 4.  -Old anterior infarct. Rightward P/QRS axis and rotation -possible pulmonary disease. ABNORMAL     Assessment   Diagnosis Orders   1. Pure hypercholesterolemia     2. Essential hypertension     3. S/P angioplasty with Balloon of the RT SFA now 2019     4. Morbid obesity due to excess calories ([facility] [address] [facility]     5. Bilateral leg edema +1 chronic           Plan   The  current  meds and labs reviewed    Continue the current treatment and with constant vigilance to changes in symptoms and also any potential side effects. Return for care or seek medical attention immediately if symptoms got worse and/or develop new symptoms. Echo and nuc stress negative  Chronic sob on exertion  No chest pain  May proceed for coloscopy   May stop asa and plavix for 4 months  D/w the pat    LEG EDEMA +1  ON LASIX PRN    HX OF ILR FOR CRYPTOGENIC cva 2010  Interrogation showed PACs    ILR interrogation feb 2021  ndication was cryptogenic CVA  False pause   I do not see any  definite atrial FIB  It is Irregular appears to me due to Freq PACs noted some times in bigminy pattern and occasional pvcs noted      PAD  No intermittent claudication  S/p angioplasty of RT SFA  Cont asa 81  Cont plavix 75 po dq    Non-compliance with f/u advised    Hypertension, on medical treatment. Seems to be under good control. Patient is compliant with medical treatment. Discussed use, benefit, and side effects of prescribed medications. All patient questions answered. Pt voiced understanding. Instructed to continue current medications, diet and exercise. Continue risk factor modification and medical management.  Patient agreed with treatment plan. Follow up as directed.       RTC in 6 months      [facility]

## 2021-03-26 ENCOUNTER — HOSPITAL ENCOUNTER (OUTPATIENT)
Age: 66
Discharge: HOME | End: 2021-03-26
Payer: COMMERCIAL

## 2021-03-26 ENCOUNTER — HOSPITAL ENCOUNTER (OUTPATIENT)
Dept: ULTRASOUND IMAGING | Age: 66
Discharge: HOME | End: 2021-03-26
Payer: COMMERCIAL

## 2021-03-26 DIAGNOSIS — E04.9 GOITER: ICD-10-CM

## 2021-03-26 LAB
T4 FREE: 1.1 NG/DL (ref 0.93–1.76)
TSH SERPL DL<=0.05 MIU/L-ACNC: 2.75 UIU/ML (ref 0.4–4.2)

## 2021-03-26 PROCEDURE — 86376 MICROSOMAL ANTIBODY EACH: CPT

## 2021-03-26 PROCEDURE — 76536 US EXAM OF HEAD AND NECK: CPT

## 2021-03-26 PROCEDURE — 36415 COLL VENOUS BLD VENIPUNCTURE: CPT

## 2021-03-26 PROCEDURE — 84439 ASSAY OF FREE THYROXINE: CPT

## 2021-03-26 PROCEDURE — 84443 ASSAY THYROID STIM HORMONE: CPT

## 2021-03-28 LAB — MISC. #1 REFERENCE GROUP TEST: NORMAL

## 2021-03-29 LAB — THYROID PEROXIDASE ANTIBODY: < 4 IU/ML (ref 0–25)

## 2021-04-08 ENCOUNTER — TELEPHONE (OUTPATIENT)
Dept: CARDIOLOGY CLINIC | Age: 66
End: 2021-04-08

## 2021-05-10 ENCOUNTER — PROCEDURE VISIT (OUTPATIENT)
Dept: CARDIOLOGY CLINIC | Age: 66
End: 2021-05-10
Payer: COMMERCIAL

## 2021-05-10 DIAGNOSIS — I63.9 CRYPTOGENIC STROKE (HCC): Primary | ICD-10-CM

## 2021-05-10 PROCEDURE — G2066 INTER DEVC REMOTE 30D: HCPCS | Performed by: INTERNAL MEDICINE

## 2021-05-10 PROCEDURE — 93298 REM INTERROG DEV EVAL SCRMS: CPT | Performed by: INTERNAL MEDICINE

## 2021-05-10 NOTE — PROGRESS NOTES
[facility] [employer]   Patient of Kristen    History of Stroke    Battery okay    Episodes:   ? False pauses/ PACs/PVCs

## 2021-06-16 ENCOUNTER — PROCEDURE VISIT (OUTPATIENT)
Dept: CARDIOLOGY CLINIC | Age: 66
End: 2021-06-16
Payer: COMMERCIAL

## 2021-06-16 DIAGNOSIS — I63.9 CRYPTOGENIC STROKE (HCC): Primary | ICD-10-CM

## 2021-06-16 PROCEDURE — 93298 REM INTERROG DEV EVAL SCRMS: CPT | Performed by: INTERNAL MEDICINE

## 2021-06-16 PROCEDURE — G2066 INTER DEVC REMOTE 30D: HCPCS | Performed by: INTERNAL MEDICINE

## 2021-07-19 ENCOUNTER — PROCEDURE VISIT (OUTPATIENT)
Dept: CARDIOLOGY CLINIC | Age: 66
End: 2021-07-19
Payer: COMMERCIAL

## 2021-07-19 DIAGNOSIS — I63.9 CRYPTOGENIC STROKE (HCC): Primary | ICD-10-CM

## 2021-07-19 PROCEDURE — G2066 INTER DEVC REMOTE 30D: HCPCS | Performed by: INTERNAL MEDICINE

## 2021-07-19 PROCEDURE — 93298 REM INTERROG DEV EVAL SCRMS: CPT | Performed by: INTERNAL MEDICINE

## 2021-07-19 NOTE — PROGRESS NOTES
[facility] [employer]   Patient of Kristen    History of stroke    No history afib/no OAC    Battery okay    Episodes:  False pauses & bradycardia   Device reading a fib-- looks like NSR with frequent PACs/PVCs    5/24/21 ? ??aflutter

## 2021-08-24 ENCOUNTER — PROCEDURE VISIT (OUTPATIENT)
Dept: CARDIOLOGY CLINIC | Age: 66
End: 2021-08-24
Payer: COMMERCIAL

## 2021-08-24 DIAGNOSIS — I63.9 CRYPTOGENIC STROKE (HCC): Primary | ICD-10-CM

## 2021-08-24 NOTE — PROGRESS NOTES
[facility] [employer]   Patient of Kristen    History of stroke    Battery okay    Episodes:  False pauses    PACs  Bigeminal PACs  PVCs

## 2021-08-25 PROCEDURE — G2066 INTER DEVC REMOTE 30D: HCPCS | Performed by: INTERNAL MEDICINE

## 2021-08-25 PROCEDURE — 93298 REM INTERROG DEV EVAL SCRMS: CPT | Performed by: INTERNAL MEDICINE

## 2021-09-14 ENCOUNTER — TELEPHONE (OUTPATIENT)
Dept: CARDIOLOGY CLINIC | Age: 66
End: 2021-09-14

## 2021-09-27 ENCOUNTER — HOSPITAL ENCOUNTER (OUTPATIENT)
Age: 66
Setting detail: SPECIMEN
Discharge: HOME | End: 2021-09-27
Payer: COMMERCIAL

## 2021-09-27 LAB
ANION GAP SERPL CALCULATED.3IONS-SCNC: 19 MMOL/L (ref 9–17)
BUN BLDV-MCNC: 13 MG/DL (ref 8–23)
BUN/CREAT BLD: ABNORMAL (ref 9–20)
CALCIUM SERPL-MCNC: 9.2 MG/DL (ref 8.6–10.4)
CHLORIDE BLD-SCNC: 105 MMOL/L (ref 98–107)
CHOLESTEROL/HDL RATIO: 4.3
CHOLESTEROL: 222 MG/DL
CO2: 19 MMOL/L (ref 20–31)
CREAT SERPL-MCNC: 0.85 MG/DL (ref 0.5–0.9)
GFR AFRICAN AMERICAN: >60 ML/MIN
GFR NON-AFRICAN AMERICAN: >60 ML/MIN
GFR SERPL CREATININE-BSD FRML MDRD: ABNORMAL ML/MIN/{1.73_M2}
GFR SERPL CREATININE-BSD FRML MDRD: ABNORMAL ML/MIN/{1.73_M2}
GLUCOSE BLD-MCNC: 109 MG/DL (ref 70–99)
HDLC SERPL-MCNC: 52 MG/DL
LDL CHOLESTEROL: 157 MG/DL (ref 0–130)
POTASSIUM SERPL-SCNC: 4.3 MMOL/L (ref 3.7–5.3)
SODIUM BLD-SCNC: 143 MMOL/L (ref 135–144)
TRIGL SERPL-MCNC: 63 MG/DL
VLDLC SERPL CALC-MCNC: ABNORMAL MG/DL (ref 1–30)

## 2021-09-28 ENCOUNTER — PROCEDURE VISIT (OUTPATIENT)
Dept: CARDIOLOGY CLINIC | Age: 66
End: 2021-09-28
Payer: COMMERCIAL

## 2021-09-28 DIAGNOSIS — I63.9 CRYPTOGENIC STROKE (HCC): Primary | ICD-10-CM

## 2021-09-28 NOTE — PROGRESS NOTES
[facility] [employer]   Patient of Kristen    History of stroke    Battery okay      Episodes:  False pauses  afib episode --? PACs / ?SA

## 2021-09-29 PROCEDURE — G2066 INTER DEVC REMOTE 30D: HCPCS | Performed by: INTERNAL MEDICINE

## 2021-09-29 PROCEDURE — 93298 REM INTERROG DEV EVAL SCRMS: CPT | Performed by: INTERNAL MEDICINE

## 2021-11-04 ENCOUNTER — TELEPHONE (OUTPATIENT)
Dept: CARDIOLOGY CLINIC | Age: 66
End: 2021-11-04

## 2021-11-08 ENCOUNTER — PROCEDURE VISIT (OUTPATIENT)
Dept: CARDIOLOGY CLINIC | Age: 66
End: 2021-11-08
Payer: COMMERCIAL

## 2021-11-08 DIAGNOSIS — I63.9 CRYPTOGENIC STROKE (HCC): Primary | ICD-10-CM

## 2021-11-08 PROCEDURE — 93298 REM INTERROG DEV EVAL SCRMS: CPT | Performed by: INTERNAL MEDICINE

## 2021-11-08 PROCEDURE — G2066 INTER DEVC REMOTE 30D: HCPCS | Performed by: INTERNAL MEDICINE

## 2021-11-08 NOTE — PROGRESS NOTES
[facility] [employer]   Patient of Kristen    History of stroke    Battery okay    Episodes:  False pauses

## 2021-12-07 ENCOUNTER — PROCEDURE VISIT (OUTPATIENT)
Dept: CARDIOLOGY CLINIC | Age: 66
End: 2021-12-07

## 2021-12-07 ENCOUNTER — TELEPHONE (OUTPATIENT)
Dept: CARDIOLOGY CLINIC | Age: 66
End: 2021-12-07

## 2021-12-07 DIAGNOSIS — I63.9 CRYPTOGENIC STROKE (HCC): Primary | ICD-10-CM

## 2021-12-07 NOTE — PROGRESS NOTES
DR MEEKS PLEASE VERIFY IF YOU THINK THIS IS AFIB??  I THINK IT LOOKS MORE LIKE NSR WITH PACS   HAD LINQ IMPLANTED FOR CRYPTOGENIC STROKE / NO DX OF AFIB AND NO ANTI COAGS    CURRENT AFIB BURDEN 0%  LIFETIME AFIB BURDEN 0.6%    FALSE PAUSES NOTED

## 2021-12-08 PROCEDURE — G2066 INTER DEVC REMOTE 30D: HCPCS | Performed by: INTERNAL MEDICINE

## 2021-12-08 PROCEDURE — 93298 REM INTERROG DEV EVAL SCRMS: CPT | Performed by: INTERNAL MEDICINE

## 2021-12-16 ENCOUNTER — OFFICE VISIT (OUTPATIENT)
Dept: CARDIOLOGY CLINIC | Age: 66
End: 2021-12-16
Payer: COMMERCIAL

## 2021-12-16 VITALS
DIASTOLIC BLOOD PRESSURE: 85 MMHG | HEIGHT: 68 IN | SYSTOLIC BLOOD PRESSURE: 151 MMHG | BODY MASS INDEX: 52.2 KG/M2 | HEART RATE: 73 BPM

## 2021-12-16 DIAGNOSIS — E78.00 PURE HYPERCHOLESTEROLEMIA: ICD-10-CM

## 2021-12-16 DIAGNOSIS — Z01.818 PRE-OP EVALUATION: Primary | ICD-10-CM

## 2021-12-16 DIAGNOSIS — R94.31 ABNORMAL EKG: ICD-10-CM

## 2021-12-16 DIAGNOSIS — R06.02 SOB (SHORTNESS OF BREATH) ON EXERTION: ICD-10-CM

## 2021-12-16 DIAGNOSIS — I10 PRIMARY HYPERTENSION: ICD-10-CM

## 2021-12-16 DIAGNOSIS — R60.0 BILATERAL LEG EDEMA: ICD-10-CM

## 2021-12-16 DIAGNOSIS — Z98.62 S/P ANGIOPLASTY: ICD-10-CM

## 2021-12-16 DIAGNOSIS — I73.9 PAD (PERIPHERAL ARTERY DISEASE) (HCC): ICD-10-CM

## 2021-12-16 PROCEDURE — 99214 OFFICE O/P EST MOD 30 MIN: CPT | Performed by: INTERNAL MEDICINE

## 2021-12-16 PROCEDURE — 1123F ACP DISCUSS/DSCN MKR DOCD: CPT | Performed by: INTERNAL MEDICINE

## 2021-12-16 PROCEDURE — 1036F TOBACCO NON-USER: CPT | Performed by: INTERNAL MEDICINE

## 2021-12-16 PROCEDURE — 1090F PRES/ABSN URINE INCON ASSESS: CPT | Performed by: INTERNAL MEDICINE

## 2021-12-16 PROCEDURE — 93000 ELECTROCARDIOGRAM COMPLETE: CPT | Performed by: INTERNAL MEDICINE

## 2021-12-16 PROCEDURE — G8427 DOCREV CUR MEDS BY ELIG CLIN: HCPCS | Performed by: INTERNAL MEDICINE

## 2021-12-16 PROCEDURE — 3017F COLORECTAL CA SCREEN DOC REV: CPT | Performed by: INTERNAL MEDICINE

## 2021-12-16 PROCEDURE — G8484 FLU IMMUNIZE NO ADMIN: HCPCS | Performed by: INTERNAL MEDICINE

## 2021-12-16 PROCEDURE — 4040F PNEUMOC VAC/ADMIN/RCVD: CPT | Performed by: INTERNAL MEDICINE

## 2021-12-16 PROCEDURE — G8417 CALC BMI ABV UP PARAM F/U: HCPCS | Performed by: INTERNAL MEDICINE

## 2021-12-16 PROCEDURE — G8400 PT W/DXA NO RESULTS DOC: HCPCS | Performed by: INTERNAL MEDICINE

## 2021-12-16 NOTE — PROGRESS NOTES
Chief Complaint   Patient presents with    Pre-op Exam    Hypertension   Former pat of Dr. Tia Rodriguez  Patient with past medical history of HTN, dyslipidemia is here for the follow-up after hospital discharge. She was admitted with TIA. Negative neuro work up. No ASD or PFO. ILR was inserted for arrhythmias assessment in view of CVA      9  MONTH f/U    PRE-OP FOR A TOOTH EXTRACTION AT [facility] NOT YET SCHEDULED. EKG DONE TODAY.     Denied cp, palpitations, edema or dizziness     No intermittent Claudication    Sob on exertion    On wheelchair      Patient Active Problem List   Diagnosis    Cryptogenic stroke ([address]    Spondylolisthesis of lumbar region    Degenerative lumbar spinal stenosis    Morbid obesity due to excess calories ([address]    History of CVA (cerebrovascular accident)    Postoperative anemia due to acute blood loss    Hypertension    Diabetes mellitus ([address]    Episodic confusion    Spinal stenosis, lumbar region, without neurogenic claudication    Diabetes mellitus type 2 in obese (HCC)    Shortness of breath    Acute CVA (cerebrovascular accident) ([address]    Paresthesia    Bilateral leg edema +1 chronic    SOB (shortness of breath) on exertion    Claudication in peripheral vascular disease (HCC)    Numbness    [facility]    S/P angioplasty with Balloon of the RT SFA now 2019    Pure hypercholesterolemia    COVID-19    Pre-op evaluation FOR DENTAL EXTRACTION    Abnormal EKG       Past Surgical History:   Procedure Laterality Date    BACK SURGERY      COLONOSCOPY      ENDOSCOPY, COLON, DIAGNOSTIC      JOINT REPLACEMENT Bilateral     knees    KNEE SURGERY  2006    x2    GA ECHO TRANSESOPHAG R-T 2D IMG ACQUISJ I&R ONLY Left 6/8/2018    TRANSESOPHAGEAL ECHOCARDIOGRAM performed by Xuan Patricio MD at [address] Right 06/2019    x3    VASCULAR SURGERY      left carotid       Allergies   Allergen Reactions    Lisinopril      Other reaction(s): cough    Codeine Nausea And Vomiting     Patient states it can make her vomit.  Vicodin [Hydrocodone-Acetaminophen] Nausea And Vomiting        Family History   Problem Relation Age of Onset   Grady Stroke Mother     Heart Disease Father     Heart Attack Father     Cancer Sister    Grady Cancer Sister     Diabetes Neg Hx         Social History     Socioeconomic History    Marital status:      Spouse name: Xuan Patricio Number of children: 3    Years of education: Not on file    Highest education level: Not on file   Occupational History    Occupation: disability   Tobacco Use    Smoking status: Never Smoker    Smokeless tobacco: Never Used   Vaping Use    Vaping Use: Never used   Substance and Sexual Activity    Alcohol use: Never    Drug use: No    Sexual activity: Yes     Partners: Male   Other Topics Concern    Not on file   Social History Narrative    Not on file     Social Determinants of Health     Financial Resource Strain:     Difficulty of Paying Living Expenses: Not on file   Food Insecurity:     Worried About [address] in the Last Year: Not on file    Karen of Food in the Last Year: Not on file   Transportation Needs:     Lack of Transportation (Medical): Not on file    Lack of Transportation (Non-Medical):  Not on file   Physical Activity:     Days of Exercise per Week: Not on file    Minutes of Exercise per Session: Not on file   Stress:     Feeling of Stress : Not on file   Social Connections:     Frequency of Communication with Friends and Family: Not on file    Frequency of Social Gatherings with Friends and Family: Not on file    Attends Caodaism Services: Not on file    Active Member of Clubs or Organizations: Not on file    Attends Club or Organization Meetings: Not on file    Marital Status: Not on file   Intimate Partner Violence:     Fear of Current or Ex-Partner: Not on file    Emotionally Abused: Not on file    Physically Abused: Not on file    Sexually Abused: Not on file   Housing Stability:     Unable to Pay for Housing in the Last Year: Not on file    Number of Places Lived in the Last Year: Not on file    Unstable Housing in the Last Year: Not on file       Current Outpatient Medications   Medication Sig Dispense Refill    cetirizine (ZYRTEC) 10 MG tablet Take 10 mg by mouth daily      capsicum (ZOSTRIX) 0.075 % topical cream Apply topically 3 times daily Apply topically 3 times daily.  spironolactone (ALDACTONE) 25 MG tablet Take 25 mg by mouth daily      losartan (COZAAR) 50 MG tablet Take 50 mg by mouth daily      potassium chloride (KLOR-CON) 10 MEQ extended release tablet Take 10 mEq by mouth daily      Semaglutide,0.25 or 0.5MG/DOS, 2 MG/1.5ML SOPN Inject into the skin      glimepiride (AMARYL) 2 MG tablet Take 2 mg by mouth 2 times daily      atorvastatin (LIPITOR) 40 MG tablet Take 40 mg by mouth daily      colchicine (COLCRYS) 0.6 MG tablet Take 0.6 mg by mouth daily      insulin glargine (LANTUS) 100 UNIT/ML injection vial Inject 25 Units into the skin 2 times daily 1 vial 3    insulin lispro (HUMALOG) 100 UNIT/ML injection vial Inject 0-18 Units into the skin 3 times daily (with meals) 1 vial 3    insulin lispro (HUMALOG) 100 UNIT/ML injection vial Inject 0-9 Units into the skin nightly 1 vial 3    miconazole (MICOTIN) 2 % powder Apply topically 2 times daily.  45 g 1    zinc sulfate (ZINCATE) 220 (50 Zn) MG capsule Take 1 capsule by mouth daily 30 capsule 0    Cholecalciferol (VITAMIN D) 25 MCG TABS Take 1 tablet by mouth daily 30 tablet 0    clopidogrel (PLAVIX) 75 MG tablet Take 1 tablet by mouth daily 30 tablet 7    aspirin 81 MG chewable tablet Take 1 tablet by mouth daily 30 tablet 3    Ascorbic Acid (VITAMIN C) 250 MG tablet Take 250 mg by mouth daily      amLODIPine (NORVASC) 5 MG tablet Take 10 mg by mouth daily       atenolol (TENORMIN) 100 MG tablet Take 100 mg by mouth Take 1 tab daily       No current facility-administered medications for this visit. Review of Systems -     General ROS: negative  Psychological ROS: negative  Hematological and Lymphatic ROS: No history of blood clots or bleeding disorder. Respiratory ROS: no cough, shortness of breath, or wheezing  Cardiovascular ROS: no chest pain or dyspnea on exertion  Gastrointestinal ROS: negative  Genito-Urinary ROS: no dysuria, trouble voiding, or hematuria  Musculoskeletal ROS: negative  Neurological ROS: no TIA or stroke symptoms  Dermatological ROS: negative      Blood pressure (!) 151/85, pulse 73, height 5' 8\" (1.727 m). Physical Examination:    General appearance - alert, well appearing, and in no distress  Mental status - alert, oriented to person, place, and time  Neck - supple, no significant adenopathy, no JVD, or carotid bruits  Chest - clear to auscultation, no wheezes, rales or rhonchi, symmetric air entry  Heart - normal rate, regular rhythm, normal S1, S2, no murmurs, rubs, clicks or gallops  Abdomen - soft, nontender, nondistended, no masses or organomegaly  Neurological - alert, oriented, normal speech, no focal findings or movement disorder noted  Musculoskeletal - no joint tenderness, deformity or swelling  Extremities - peripheral pulses normal, no pedal edema, no clubbing or cyanosis  Skin - normal coloration and turgor, no rashes, no suspicious skin lesions noted    Lab  No results for input(s): CKTOTAL, CKMB, CKMBINDEX, TROPONINI in the last 72 hours.   CBC:   Lab Results   Component Value Date    WBC 6.8 12/16/2020    RBC 6.22 12/16/2020    HGB 16.7 12/16/2020    HCT 51.5 12/16/2020    MCV 82.8 12/16/2020    MCH 26.8 12/16/2020    MCHC 32.4 12/16/2020    RDW 14.5 02/11/2020     12/16/2020    MPV 11.1 12/16/2020     BMP:    Lab Results   Component Value Date     09/27/2021    K 4.3 09/27/2021    K 4.4 12/13/2020     09/27/2021    CO2 19 09/27/2021    BUN 13 09/27/2021    LABALBU 3.0 12/17/2020    CREATININE 0.85 09/27/2021    CALCIUM 9.2 09/27/2021    GFRAA >60 09/27/2021    LABGLOM >60 09/27/2021    LABGLOM >90 12/16/2020    GLUCOSE 109 09/27/2021     Hepatic Function Panel:    Lab Results   Component Value Date    ALKPHOS 46 12/17/2020    ALT 21 12/17/2020    AST 21 12/17/2020    PROT 5.5 12/17/2020    BILITOT 0.5 12/17/2020    BILIDIR <0.2 12/17/2020    LABALBU 3.0 12/17/2020     Magnesium:    Lab Results   Component Value Date    MG 2.2 12/15/2020     Warfarin PT/INR:  No components found for: PTPATWAR, PTINRWAR  HgBA1c:    Lab Results   Component Value Date    LABA1C 15.4 11/04/2020     FLP:    Lab Results   Component Value Date    TRIG 63 09/27/2021    HDL 52 09/27/2021    LDLCALC 90 06/07/2018     TSH:    Lab Results   Component Value Date    TSH 2.750 03/26/2021       ekg 10/9/18  NSR, freq PACs    4/10/19  Nsr, FREQ PACS    ILR- INTERROGATION DONE 4/1/19  NO ATR FIB RATHER nsr WITH FREQ PACS  nO ATR FIB NOTED         Even though the ABIs are within normal limits bilaterally, there is a significant drop off in pulsatility in both popliteal arteries and trifurcation vessels bilaterally, suggesting significant stenosis both distal SFA regions.               This report has been created using voice recognition software.  It may contain minor errors which are inherent in voice recognition technology.       Final report electronically signed by Dr. 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 Mild  atheromatous disease along the length of the vessels.     COMMON ILIAC ARTERIES:  Bilaterally patent.     EXTERNAL ILIAC ARTERIES:  Bilaterally patent.     COMMON FEMORAL ARTERIES:  Bilaterally patent.     SFA:  80% to 90% right SFA concerning stenosis throughout the mid SFA.    Left SFA has mild calcification as well as about 30 to 40% stenosis in  mid segment.     POPLITEAL ARTERY:  Left popliteal artery although patent has about 50%  stenosis. There is right 40% popliteal artery stenosis.     TIBIOPERONEAL ARTERIES:  Anterior tibial artery and posterior tibial  artery are bilaterally patent. The left PT is diffusely diseased. The  peroneal artery is patent.     PEDAL ARTERIES:  Two vessel runoff to both feet.         SUMMARY:  Successful right SFA angioplasty with a 6 x 120 [place]  balloon.     PLAN:  1. Bedrest.  2.  Optimal medical therapy. 3.  Risk factor management. 4.  Routine access site care. 5.  DAPT. 6.  Risk factor management. 7.  Continued surveillance of the left lower extremity. 8.  Any repeat intervention would require pedal approach or groin access  due to equipment length.     All the above were explained to the patient and the patient's family. They were agreeable and amenable to the above plan.        Ariela Jorge MD     D: 11/13/2019 5:47:    ekg 12/6/19  Sinus  Rhythm  - frequent PAC s   # PACs = 4.  -Old anterior infarct. Rightward P/QRS axis and rotation -possible pulmonary disease. ABNORMAL     EKG 12/16/21  Sinus  Rhythm  - frequent PAC s   # PACs = 2.  -Poor R-wave progression -may be secondary to pulmonary disease   consider old anterior infarct. Low voltage with rightward P-axis and rotation -possible pulmonary disease. Assessment   Diagnosis Orders   1. Pre-op evaluation FOR DENTAL EXTRACTION  ECHO Complete 2D W Doppler W Color   2. Primary hypertension  EKG 12 Lead    ECHO Complete 2D W Doppler W Color   3. Pure hypercholesterolemia  ECHO Complete 2D W Doppler W Color   4. SOB (shortness of breath) on exertion  ECHO Complete 2D W Doppler W Color   5. PAD (peripheral artery disease) ([address]     6. Abnormal EKG  ECHO Complete 2D W Doppler W Color   7. Bilateral leg edema +1 chronic     8.  S/P angioplasty with Balloon of the RT SFA now 2019           Plan   The  MOST current  meds and labs reviewed    Continue the current treatment and with constant vigilance to changes in symptoms and also any potential side effects. Return for care or seek medical attention immediately if symptoms got worse and/or develop new symptoms. PRE OP FOR DENTAL EXTRACTION  MAY PROCEED DENTAL EXT       Echo and nuc stress negative  Chronic sob on exertion  No chest pain  May proceed for coloscopy   May stop asa and plavix for 4 months  D/w the pat    LEG EDEMA +1- CHRONIC  ON LASIX PRN    HX OF ILR FOR CRYPTOGENIC cva 2010  Interrogation showed PACs    ILR interrogation SO FAR -DEC 2021  ndication was cryptogenic CVA  False pause   I do not see any  definite atrial FIB  It is Irregular appears to me due to Freq PACs noted some times in bigminy pattern and occasional pvcs noted      PAD  No intermittent claudication  S/p angioplasty of RT SFA  Cont asa 81  Cont plavix 75 po dq    Non-compliance with f/u advised    Hypertension, on medical treatment. Seems to be under good control. Patient is compliant with medical treatment. OB ALDACTONE  K 4.3   hOME bp ADVISED    PAT STABLE AND DOING WELL      Discussed use, benefit, and side effects of prescribed medications. All patient questions answered. Pt voiced understanding. Instructed to continue current medications, diet and exercise. Continue risk factor modification and medical management. Patient agreed with treatment plan. Follow up as directed.       RTC in 6 months      [facility]

## 2021-12-23 ENCOUNTER — HOSPITAL ENCOUNTER (OUTPATIENT)
Dept: NON INVASIVE DIAGNOSTICS | Age: 66
Discharge: HOME | End: 2021-12-23
Payer: COMMERCIAL

## 2021-12-23 DIAGNOSIS — Z01.818 PRE-OP EVALUATION: ICD-10-CM

## 2021-12-23 DIAGNOSIS — R94.31 ABNORMAL EKG: ICD-10-CM

## 2021-12-23 DIAGNOSIS — R06.02 SOB (SHORTNESS OF BREATH) ON EXERTION: ICD-10-CM

## 2021-12-23 DIAGNOSIS — I10 PRIMARY HYPERTENSION: ICD-10-CM

## 2021-12-23 DIAGNOSIS — E78.00 PURE HYPERCHOLESTEROLEMIA: ICD-10-CM

## 2021-12-23 LAB
LV EF: 60 %
LVEF MODALITY: NORMAL

## 2021-12-23 PROCEDURE — 93306 TTE W/DOPPLER COMPLETE: CPT

## 2021-12-27 ENCOUNTER — TELEPHONE (OUTPATIENT)
Dept: CARDIOLOGY CLINIC | Age: 66
End: 2021-12-27

## 2021-12-27 NOTE — TELEPHONE ENCOUNTER
Pt had recent echo12/23/21    Summary   Technically difficult examination. Normal left ventricle size and systolic function. Ejection fraction was   estimated at 60 %. There were no regional left ventricular wall motion   abnormalities and wall thickness was within normal limits. The left atrium is Mildly dilated. There is mild-to-moderate aortic stenosis with valve area of 1.4 sq cm. The maximum aortic valve gradient is 16 mmHg, the mean gradient is 9 mmHg,   and the peak velocity is 2 m/s. Anything needed before next appt    Pt next appt 6/16/22    Dr. Toby Camacho advise?

## 2022-01-11 ENCOUNTER — TELEPHONE (OUTPATIENT)
Dept: CARDIOLOGY CLINIC | Age: 67
End: 2022-01-11

## 2022-01-15 PROBLEM — Z01.818 PRE-OP EVALUATION: Status: RESOLVED | Noted: 2021-12-16 | Resolved: 2022-01-15

## 2022-01-21 ENCOUNTER — TELEPHONE (OUTPATIENT)
Dept: CARDIOLOGY CLINIC | Age: 67
End: 2022-01-21

## 2022-01-21 ENCOUNTER — PROCEDURE VISIT (OUTPATIENT)
Dept: CARDIOLOGY CLINIC | Age: 67
End: 2022-01-21

## 2022-01-21 DIAGNOSIS — I63.9 CRYPTOGENIC STROKE (HCC): Primary | ICD-10-CM

## 2022-01-21 NOTE — TELEPHONE ENCOUNTER
[facility] [employer] [facility]   Patient of Kristen    History of crypto stroke    Battery hit TED 1/18/22 -- LMOM for patient to call office and update. Patient taken out of [place].     Episodes:  false pauses

## 2022-06-15 ENCOUNTER — HOSPITAL ENCOUNTER (OUTPATIENT)
Dept: WOMENS IMAGING | Age: 67
Discharge: HOME | End: 2022-06-15

## 2022-06-15 ENCOUNTER — HOSPITAL ENCOUNTER (OUTPATIENT)
Dept: WOMENS IMAGING | Age: 67
Discharge: HOME | End: 2022-06-15
Payer: COMMERCIAL

## 2022-06-15 DIAGNOSIS — Z12.31 VISIT FOR SCREENING MAMMOGRAM: ICD-10-CM

## 2022-06-15 DIAGNOSIS — Z12.31 SCREENING MAMMOGRAM FOR HIGH-RISK PATIENT: ICD-10-CM

## 2022-06-15 PROCEDURE — 77063 BREAST TOMOSYNTHESIS BI: CPT

## 2022-07-24 ENCOUNTER — HOSPITAL ENCOUNTER (EMERGENCY)
Age: 67
Discharge: HOME | End: 2022-07-24
Attending: EMERGENCY MEDICINE
Payer: MEDICARE

## 2022-07-24 ENCOUNTER — APPOINTMENT (OUTPATIENT)
Dept: GENERAL RADIOLOGY | Age: 67
End: 2022-07-24
Payer: MEDICARE

## 2022-07-24 ENCOUNTER — APPOINTMENT (OUTPATIENT)
Dept: INTERVENTIONAL RADIOLOGY/VASCULAR | Age: 67
End: 2022-07-24
Payer: MEDICARE

## 2022-07-24 VITALS
HEIGHT: 68 IN | RESPIRATION RATE: 20 BRPM | DIASTOLIC BLOOD PRESSURE: 75 MMHG | WEIGHT: 293 LBS | HEART RATE: 87 BPM | TEMPERATURE: 97.7 F | BODY MASS INDEX: 44.41 KG/M2 | OXYGEN SATURATION: 98 % | SYSTOLIC BLOOD PRESSURE: 158 MMHG

## 2022-07-24 DIAGNOSIS — R60.0 BILATERAL LEG EDEMA: Primary | ICD-10-CM

## 2022-07-24 LAB
ALBUMIN SERPL-MCNC: 3.5 G/DL (ref 3.5–5.1)
ALP BLD-CCNC: 67 U/L (ref 38–126)
ALT SERPL-CCNC: 15 U/L (ref 11–66)
ANION GAP SERPL CALCULATED.3IONS-SCNC: 10 MEQ/L (ref 8–16)
AST SERPL-CCNC: 14 U/L (ref 5–40)
BASOPHILS # BLD: 1 %
BASOPHILS ABSOLUTE: 0.1 THOU/MM3 (ref 0–0.1)
BILIRUB SERPL-MCNC: 0.4 MG/DL (ref 0.3–1.2)
BUN BLDV-MCNC: 15 MG/DL (ref 7–22)
CALCIUM SERPL-MCNC: 9.1 MG/DL (ref 8.5–10.5)
CHLORIDE BLD-SCNC: 103 MEQ/L (ref 98–111)
CO2: 25 MEQ/L (ref 23–33)
CREAT SERPL-MCNC: 0.7 MG/DL (ref 0.4–1.2)
EKG ATRIAL RATE: 79 BPM
EKG P AXIS: 38 DEGREES
EKG P-R INTERVAL: 184 MS
EKG Q-T INTERVAL: 406 MS
EKG QRS DURATION: 86 MS
EKG QTC CALCULATION (BAZETT): 465 MS
EKG R AXIS: 20 DEGREES
EKG T AXIS: 9 DEGREES
EKG VENTRICULAR RATE: 79 BPM
EOSINOPHIL # BLD: 4.7 %
EOSINOPHILS ABSOLUTE: 0.4 THOU/MM3 (ref 0–0.4)
ERYTHROCYTE [DISTWIDTH] IN BLOOD BY AUTOMATED COUNT: 15.7 % (ref 11.5–14.5)
ERYTHROCYTE [DISTWIDTH] IN BLOOD BY AUTOMATED COUNT: 49.1 FL (ref 35–45)
GFR SERPL CREATININE-BSD FRML MDRD: > 90 ML/MIN/1.73M2
GLUCOSE BLD-MCNC: 247 MG/DL (ref 70–108)
HCT VFR BLD CALC: 44.2 % (ref 37–47)
HEMOGLOBIN: 13.8 GM/DL (ref 12–16)
IMMATURE GRANS (ABS): 0.04 THOU/MM3 (ref 0–0.07)
IMMATURE GRANULOCYTES: 0.5 %
LYMPHOCYTES # BLD: 29.4 %
LYMPHOCYTES ABSOLUTE: 2.3 THOU/MM3 (ref 1–4.8)
MCH RBC QN AUTO: 27 PG (ref 26–33)
MCHC RBC AUTO-ENTMCNC: 31.2 GM/DL (ref 32.2–35.5)
MCV RBC AUTO: 86.5 FL (ref 81–99)
MONOCYTES # BLD: 7.4 %
MONOCYTES ABSOLUTE: 0.6 THOU/MM3 (ref 0.4–1.3)
NUCLEATED RED BLOOD CELLS: 0 /100 WBC
OSMOLALITY CALCULATION: 284.8 MOSMOL/KG (ref 275–300)
PLATELET # BLD: 210 THOU/MM3 (ref 130–400)
PMV BLD AUTO: 10.5 FL (ref 9.4–12.4)
POTASSIUM REFLEX MAGNESIUM: 4 MEQ/L (ref 3.5–5.2)
PRO-BNP: 209.8 PG/ML (ref 0–900)
RBC # BLD: 5.11 MILL/MM3 (ref 4.2–5.4)
SEG NEUTROPHILS: 57 %
SEGMENTED NEUTROPHILS ABSOLUTE COUNT: 4.4 THOU/MM3 (ref 1.8–7.7)
SODIUM BLD-SCNC: 138 MEQ/L (ref 135–145)
TOTAL PROTEIN: 6.4 G/DL (ref 6.1–8)
TROPONIN T: < 0.01 NG/ML
WBC # BLD: 7.7 THOU/MM3 (ref 4.8–10.8)

## 2022-07-24 PROCEDURE — 99285 EMERGENCY DEPT VISIT HI MDM: CPT

## 2022-07-24 PROCEDURE — 84484 ASSAY OF TROPONIN QUANT: CPT

## 2022-07-24 PROCEDURE — 93005 ELECTROCARDIOGRAM TRACING: CPT | Performed by: STUDENT IN AN ORGANIZED HEALTH CARE EDUCATION/TRAINING PROGRAM

## 2022-07-24 PROCEDURE — 80053 COMPREHEN METABOLIC PANEL: CPT

## 2022-07-24 PROCEDURE — 85025 COMPLETE CBC W/AUTO DIFF WBC: CPT

## 2022-07-24 PROCEDURE — 93970 EXTREMITY STUDY: CPT

## 2022-07-24 PROCEDURE — 83880 ASSAY OF NATRIURETIC PEPTIDE: CPT

## 2022-07-24 PROCEDURE — 71045 X-RAY EXAM CHEST 1 VIEW: CPT

## 2022-07-24 PROCEDURE — 93010 ELECTROCARDIOGRAM REPORT: CPT | Performed by: INTERNAL MEDICINE

## 2022-07-24 ASSESSMENT — ENCOUNTER SYMPTOMS
ABDOMINAL PAIN: 0
SORE THROAT: 0
NAUSEA: 0
SHORTNESS OF BREATH: 0
BACK PAIN: 0
DIARRHEA: 0
VOMITING: 0
COUGH: 0
BLOOD IN STOOL: 0
TROUBLE SWALLOWING: 0

## 2022-07-24 ASSESSMENT — PAIN DESCRIPTION - ORIENTATION: ORIENTATION: RIGHT;LEFT

## 2022-07-24 ASSESSMENT — PAIN DESCRIPTION - FREQUENCY: FREQUENCY: CONTINUOUS

## 2022-07-24 ASSESSMENT — PAIN DESCRIPTION - LOCATION: LOCATION: FOOT

## 2022-07-24 ASSESSMENT — PAIN - FUNCTIONAL ASSESSMENT: PAIN_FUNCTIONAL_ASSESSMENT: 0-10

## 2022-07-24 ASSESSMENT — PAIN DESCRIPTION - DESCRIPTORS: DESCRIPTORS: SHARP

## 2022-07-24 ASSESSMENT — PAIN DESCRIPTION - PAIN TYPE: TYPE: ACUTE PAIN

## 2022-07-24 NOTE — ED PROVIDER NOTES
[facility] ENCOUNTER          Pt Name: Jayla Culver  MRN: [id number]  [place] 1955  Date of evaluation: 7/24/2022  Treating Resident Physician: Mauro Nielsen MD  Supervising Physician:     [address] [place] [address]       Chief Complaint   Patient presents with    Leg Swelling     History obtained from the patient. HISTORY OF PRESENT ILLNESS    HPI  Jayla Culver is a 74 y.o. female with PMHx of CAD, stroke, degenerative lumbar spinal stenosis who presents to the emergency department for evaluation of leg swelling. Ports worsening leg swelling over the last 4 days. Denies chest pain, shortness of breath, weakness, numbness or paresthesias. Patient states she has never had a blood clot. States that she used to take Lasix but had to stop because it was \"hurting her kidneys. \"  Patient states that her daughter is a nurse. She took a picture of her legs and sent it to her daughter which prompted her daughter to tell her to come to the emergency department for evaluation. EF was 60% on last echo 12/23/2021. Patient has an ILR. The patient has no other acute complaints at this time. REVIEW OF SYSTEMS   Review of Systems   Constitutional:  Negative for chills, diaphoresis, fatigue and fever. HENT:  Negative for sore throat and trouble swallowing. Eyes:  Negative for visual disturbance. Respiratory:  Negative for cough and shortness of breath. Cardiovascular:  Positive for leg swelling. Negative for chest pain and palpitations. Gastrointestinal:  Negative for abdominal pain, blood in stool, diarrhea, nausea and vomiting. Genitourinary:  Negative for dysuria, hematuria and urgency. Musculoskeletal:  Negative for arthralgias, back pain, myalgias and neck pain. Skin:  Negative for rash. Neurological:  Negative for seizures, syncope, weakness, numbness and headaches.        PAST MEDICAL AND SURGICAL HISTORY     Past Medical History: Diagnosis Date    CAD (coronary artery disease)     Degenerative lumbar spinal stenosis 1/20/2015    GERD (gastroesophageal reflux disease)     Gout     History of MI (myocardial infarction) 2004    History of stroke     Hyperlipidemia     Hypertension     Morbid obesity with body mass index of 50.0-59.9 in adult [facility])     Spondylolisthesis of lumbar region 1/20/2015    Bilateral L5 pars defects. Unspecified cerebral artery occlusion with cerebral infarction 2010     Past Surgical History:   Procedure Laterality Date    BACK SURGERY      COLONOSCOPY      ENDOSCOPY, COLON, DIAGNOSTIC      JOINT REPLACEMENT Bilateral     knees    KNEE SURGERY  2006    x2    [facility] ECHO TRANSESOPHAG R-T 2D IMG ACQUISJ I&R ONLY Left 6/8/2018    TRANSESOPHAGEAL ECHOCARDIOGRAM performed by Xuan Patricio MD at [address] [place] [address] Right 06/2019    x3    VASCULAR SURGERY      left carotid         MEDICATIONS   No current facility-administered medications for this encounter. Current Outpatient Medications:     cetirizine (ZYRTEC) 10 MG tablet, Take 10 mg by mouth daily, Disp: , Rfl:     capsicum (ZOSTRIX) 0.075 % topical cream, Apply topically 3 times daily Apply topically 3 times daily. , Disp: , Rfl:     spironolactone (ALDACTONE) 25 MG tablet, Take 25 mg by mouth daily, Disp: , Rfl:     losartan (COZAAR) 50 MG tablet, Take 50 mg by mouth daily, Disp: , Rfl:     potassium chloride (KLOR-CON) 10 MEQ extended release tablet, Take 10 mEq by mouth daily, Disp: , Rfl:     Semaglutide,0.25 or 0.5MG/DOS, 2 MG/1.5ML SOPN, Inject into the skin, Disp: , Rfl:     glimepiride (AMARYL) 2 MG tablet, Take 2 mg by mouth 2 times daily, Disp: , Rfl:     atorvastatin (LIPITOR) 40 MG tablet, Take 40 mg by mouth daily, Disp: , Rfl:     colchicine (COLCRYS) 0.6 MG tablet, Take 0.6 mg by mouth daily, Disp: , Rfl:     insulin glargine (LANTUS) 100 UNIT/ML injection vial, Inject 25 Units into the skin 2 times daily, Disp: 1 vial, Rfl: 3    insulin lispro (HUMALOG) 100 UNIT/ML injection vial, Inject 0-18 Units into the skin 3 times daily (with meals), Disp: 1 vial, Rfl: 3    insulin lispro (HUMALOG) 100 UNIT/ML injection vial, Inject 0-9 Units into the skin nightly, Disp: 1 vial, Rfl: 3    miconazole (MICOTIN) 2 % powder, Apply topically 2 times daily. , Disp: 45 g, Rfl: 1    zinc sulfate (ZINCATE) 220 (50 Zn) MG capsule, Take 1 capsule by mouth daily, Disp: 30 capsule, Rfl: 0    Cholecalciferol (VITAMIN D) 25 MCG TABS, Take 1 tablet by mouth daily, Disp: 30 tablet, Rfl: 0    clopidogrel (PLAVIX) 75 MG tablet, Take 1 tablet by mouth daily, Disp: 30 tablet, Rfl: 7    aspirin 81 MG chewable tablet, Take 1 tablet by mouth daily, Disp: 30 tablet, Rfl: 3    Ascorbic Acid (VITAMIN C) 250 MG tablet, Take 250 mg by mouth daily, Disp: , Rfl:     amLODIPine (NORVASC) 5 MG tablet, Take 10 mg by mouth daily , Disp: , Rfl:     atenolol (TENORMIN) 100 MG tablet, Take 100 mg by mouth Take 1 tab daily, Disp: , Rfl:       SOCIAL HISTORY     Social History     Social History Narrative    Not on file     Social History     Tobacco Use    Smoking status: Never    Smokeless tobacco: Never   Vaping Use    Vaping Use: Never used   Substance Use Topics    Alcohol use: Never    Drug use: No         ALLERGIES     Allergies   Allergen Reactions    Lisinopril      Other reaction(s): cough    Codeine Nausea And Vomiting     Patient states it can make her vomit. Vicodin [Hydrocodone-Acetaminophen] Nausea And Vomiting         FAMILY HISTORY     Family History   Problem Relation Age of Onset    Stroke Mother     Heart Disease Father     Heart Attack Father     Cancer Sister     Cancer Sister     Diabetes Neg Hx          PREVIOUS RECORDS   Previous records reviewed: I reviewed the patient's past medical records including relevant labs, imaging and procedures.           PHYSICAL EXAM     ED Triage Vitals [07/24/22 1036]   BP Temp Temp Source Heart Rate Resp SpO2 Height Weight   (!) 158/75 97.7 °F (36.5 °C) Oral 87 20 98 % 5' 8\" (1.727 m) (!) 325 lb (147.4 kg)     Initial vital signs and nursing assessment reviewed and normal. Body mass index is 49.42 kg/m². Pulsoximetry is normal per my interpretation. Additional Vital Signs:  Vitals:    07/24/22 1036   BP: (!) 158/75   Pulse: 87   Resp: 20   Temp: 97.7 °F (36.5 °C)   SpO2: 98%       Physical Exam  Vitals and nursing note reviewed. Constitutional:       Appearance: Normal appearance. She is obese. HENT:      Head: Normocephalic and atraumatic. Right Ear: Tympanic membrane normal.      Left Ear: Tympanic membrane normal.      Nose: Nose normal.      Mouth/Throat:      Mouth: Mucous membranes are moist.      Pharynx: Oropharynx is clear. No oropharyngeal exudate. Eyes:      General: No scleral icterus. Extraocular Movements: Extraocular movements intact. Conjunctiva/sclera: Conjunctivae normal.      Pupils: Pupils are equal, round, and reactive to light. Cardiovascular:      Rate and Rhythm: Normal rate and regular rhythm. Pulses: Normal pulses. Heart sounds: Normal heart sounds. No murmur heard. No friction rub. No gallop. Pulmonary:      Effort: Pulmonary effort is normal. No respiratory distress. Breath sounds: Normal breath sounds. Abdominal:      Palpations: Abdomen is soft. Tenderness: There is no abdominal tenderness. There is no right CVA tenderness, left CVA tenderness, guarding or rebound. Musculoskeletal:         General: No swelling or tenderness. Normal range of motion. Cervical back: Normal range of motion and neck supple. Right lower leg: Edema present. Left lower leg: Edema present. Skin:     General: Skin is warm and dry. Capillary Refill: Capillary refill takes less than 2 seconds. Neurological:      General: No focal deficit present. Mental Status: She is alert and oriented to person, place, and time.       Cranial Nerves: No cranial nerve deficit. Motor: No weakness. MEDICAL DECISION MAKING   Initial Assessment:   68-year-old female presenting with 4 days of worsening bilateral lower extremity      Differential diagnosis includes but is not limited to:  Dependent edema, CHF exacerbation, peripheral artery disease, venous insufficiency    Although some of these diagnoses are unlikely they were considered in my medical decision making. Plan:    CHF rule out  Ultrasound bilateral lower extremity        ED RESULTS   Laboratory results:  Labs Reviewed - No data to display    Radiologic studies results:  No orders to display       ED Medications administered this visit: Medications - No data to display      ED COURSE     ED Course as of 07/26/22 0258   Sun Jul 24, 2022   1055 Wells score is 0 [TM]   1131 XR CHEST PORTABLE  IMPRESSION:  1. Mild prominence of the right pulmonary vasculature is seen which may be related to mild pulmonary vascular congestion. 2. Mild stable enlargement of the cardiomediastinal silhouette is again seen. [TM]   1213 VL DUP LOWER EXTREMITY VENOUS BILATERAL  IMPRESSION:  No definite sonographic evidence of lower extremity DVT. [TM]   1213 Pro-BNP: 209.8 [TM]      ED Course User Index  [TM] Jose Elias Trejo MD         MDM:   The patient presents with worsening bilateral lower extremity edema. EKG revealed bigeminy but no acute ischemic changes. Troponin not elevated. BNP not elevated. Chest x-ray does not reveal significant pulmonary vascular congestion and there were no crackles audible on physical examination. Patient had no leg pain. Ultrasound revealed no evidence of DVT. Patient can follow-up with her PCP for further medical management if leg swelling begins to cause discomfort. Strict return precautions and follow up instructions were discussed with the patient prior to discharge, with which the patient agrees.       MEDICATION CHANGES     New Prescriptions    No medications on file FINAL DISPOSITION     Final diagnoses:   None     Condition: condition: stable  Dispo: Discharge to home      This transcription was electronically signed. Parts of this transcriptions may have been dictated by use of voice recognition software and electronically transcribed, and parts may have been transcribed with the assistance of an ED scribe. The transcription may contain errors not detected in proofreading. Please refer to my supervising physician's documentation if my documentation differs. Electronically Signed: Domenico Otto MD, 07/24/22, 10:42 AM         Lazara Fregoso MD  Resident  07/24/22 1217       Lazara Fregoso MD  Resident  07/24/22 1222      Attending Supervising Physician's Attestation Statement  I performed a history and physical examination on the patient and discussed the management with the resident physician. I reviewed and agree with the findings and plan as documented in his note unless described otherwise below. Pt presents to the ER with LE edema, no redness, no signs of infection, chf, dvt. Pt has normal pulses, normal cap refill, patient counseled regarding compression stalkings and elevation (admits to sleeping in recliner), patient also counseled regarding importance of f/u with pcp and ER return precautions.     Electronically signed by Liu Nieto MD on 7/26/22 at 2:58 AM EDT      Bibiana Mix MD  07/26/22 0300

## 2022-07-24 NOTE — ED NOTES
Pt presents to ED via triage for c/o bilat leg swelling. Pt reports noticing symptoms \"a couple days ago, I thought it would go away. \" Pt reports taking all medications as prescribed. Upon initial assessment, pt is A&Ox4, resps slightly labored with movement. Pt reports bilat feet pain with history of Gout. Pt reports \"I took my pills for gout, normally it helps. But this time it didn't. \" Pt presents with +3 pitting edema in the RLE, +2 pitting edema in the LLE. Pt denies any other pain at this time. EKG completed upon arrival. IV established with blood drawn and sent to lab. Monitor applied and VS as noted. Dr Barb Huffman at bedside for assessment.       Norberto Dangelo RN  07/24/22 7734

## 2022-08-25 ENCOUNTER — TELEPHONE (OUTPATIENT)
Dept: CARDIOLOGY CLINIC | Age: 67
End: 2022-08-25

## 2022-08-25 NOTE — TELEPHONE ENCOUNTER
Patient needs clearance to stop Aspirin 5 days for the EGD & colonoscopy scheduled for 10/5/22 and 10/19/22. Scheduled appt for patient on 09/19/22 at 1 pm. Attached is the form Dr. Fauzia St office sent over.

## 2022-09-13 ENCOUNTER — HOSPITAL ENCOUNTER (OUTPATIENT)
Age: 67
Setting detail: SPECIMEN
Discharge: HOME | End: 2022-09-13

## 2022-09-13 LAB
ABSOLUTE EOS #: 0.24 K/UL (ref 0–0.44)
ABSOLUTE IMMATURE GRANULOCYTE: 0.04 K/UL (ref 0–0.3)
ABSOLUTE LYMPH #: 2.39 K/UL (ref 1.1–3.7)
ABSOLUTE MONO #: 0.57 K/UL (ref 0.1–1.2)
ALBUMIN SERPL-MCNC: 3.6 G/DL (ref 3.5–5.2)
ALBUMIN/GLOBULIN RATIO: 1.1 (ref 1–2.5)
ALP BLD-CCNC: 68 U/L (ref 35–104)
ALT SERPL-CCNC: 16 U/L (ref 5–33)
ANION GAP SERPL CALCULATED.3IONS-SCNC: 12 MMOL/L (ref 9–17)
AST SERPL-CCNC: 16 U/L
BASOPHILS # BLD: 1 % (ref 0–2)
BASOPHILS ABSOLUTE: 0.07 K/UL (ref 0–0.2)
BILIRUB SERPL-MCNC: 0.3 MG/DL (ref 0.3–1.2)
BUN BLDV-MCNC: 14 MG/DL (ref 8–23)
CALCIUM SERPL-MCNC: 9 MG/DL (ref 8.6–10.4)
CHLORIDE BLD-SCNC: 102 MMOL/L (ref 98–107)
CHOLESTEROL/HDL RATIO: 3.9
CHOLESTEROL: 184 MG/DL
CO2: 23 MMOL/L (ref 20–31)
CREAT SERPL-MCNC: 0.76 MG/DL (ref 0.5–0.9)
EOSINOPHILS RELATIVE PERCENT: 3 % (ref 1–4)
FOLATE: 8.2 NG/ML
GFR AFRICAN AMERICAN: >60 ML/MIN
GFR NON-AFRICAN AMERICAN: >60 ML/MIN
GFR SERPL CREATININE-BSD FRML MDRD: ABNORMAL ML/MIN/{1.73_M2}
GLUCOSE BLD-MCNC: 144 MG/DL (ref 70–99)
HCT VFR BLD CALC: 47.2 % (ref 36.3–47.1)
HDLC SERPL-MCNC: 47 MG/DL
HEMOGLOBIN: 14.6 G/DL (ref 11.9–15.1)
IMMATURE GRANULOCYTES: 1 %
LDL CHOLESTEROL: 119 MG/DL (ref 0–130)
LYMPHOCYTES # BLD: 31 % (ref 24–43)
MAGNESIUM: 1.6 MG/DL (ref 1.6–2.6)
MCH RBC QN AUTO: 26.9 PG (ref 25.2–33.5)
MCHC RBC AUTO-ENTMCNC: 30.9 G/DL (ref 28.4–34.8)
MCV RBC AUTO: 87.1 FL (ref 82.6–102.9)
MONOCYTES # BLD: 7 % (ref 3–12)
NRBC AUTOMATED: 0 PER 100 WBC
PDW BLD-RTO: 15.7 % (ref 11.8–14.4)
PLATELET # BLD: 244 K/UL (ref 138–453)
PMV BLD AUTO: 10.7 FL (ref 8.1–13.5)
POTASSIUM SERPL-SCNC: 4.1 MMOL/L (ref 3.7–5.3)
RBC # BLD: 5.42 M/UL (ref 3.95–5.11)
RBC # BLD: ABNORMAL 10*6/UL
SEG NEUTROPHILS: 57 % (ref 36–65)
SEGMENTED NEUTROPHILS ABSOLUTE COUNT: 4.36 K/UL (ref 1.5–8.1)
SODIUM BLD-SCNC: 137 MMOL/L (ref 135–144)
TOTAL PROTEIN: 6.9 G/DL (ref 6.4–8.3)
TRIGL SERPL-MCNC: 90 MG/DL
TSH SERPL DL<=0.05 MIU/L-ACNC: 1.92 UIU/ML (ref 0.3–5)
VITAMIN B-12: >2000 PG/ML (ref 232–1245)
WBC # BLD: 7.7 K/UL (ref 3.5–11.3)

## 2022-09-19 ENCOUNTER — OFFICE VISIT (OUTPATIENT)
Dept: CARDIOLOGY CLINIC | Age: 67
End: 2022-09-19
Payer: COMMERCIAL

## 2022-09-19 VITALS
WEIGHT: 293 LBS | HEART RATE: 57 BPM | HEIGHT: 68 IN | SYSTOLIC BLOOD PRESSURE: 118 MMHG | BODY MASS INDEX: 44.41 KG/M2 | DIASTOLIC BLOOD PRESSURE: 79 MMHG

## 2022-09-19 DIAGNOSIS — Z01.818 PRE-OP EVALUATION: Primary | ICD-10-CM

## 2022-09-19 DIAGNOSIS — E78.00 PURE HYPERCHOLESTEROLEMIA: ICD-10-CM

## 2022-09-19 DIAGNOSIS — I10 PRIMARY HYPERTENSION: ICD-10-CM

## 2022-09-19 DIAGNOSIS — R60.0 BILATERAL LEG EDEMA: ICD-10-CM

## 2022-09-19 DIAGNOSIS — I35.0 MILD AORTIC STENOSIS: ICD-10-CM

## 2022-09-19 DIAGNOSIS — Z98.62 S/P ANGIOPLASTY: ICD-10-CM

## 2022-09-19 DIAGNOSIS — E66.01 MORBID OBESITY DUE TO EXCESS CALORIES (HCC): ICD-10-CM

## 2022-09-19 DIAGNOSIS — I73.9 PAD (PERIPHERAL ARTERY DISEASE) (HCC): ICD-10-CM

## 2022-09-19 PROCEDURE — 93000 ELECTROCARDIOGRAM COMPLETE: CPT | Performed by: INTERNAL MEDICINE

## 2022-09-19 PROCEDURE — 3017F COLORECTAL CA SCREEN DOC REV: CPT | Performed by: INTERNAL MEDICINE

## 2022-09-19 PROCEDURE — 1036F TOBACCO NON-USER: CPT | Performed by: INTERNAL MEDICINE

## 2022-09-19 PROCEDURE — G8427 DOCREV CUR MEDS BY ELIG CLIN: HCPCS | Performed by: INTERNAL MEDICINE

## 2022-09-19 PROCEDURE — 99214 OFFICE O/P EST MOD 30 MIN: CPT | Performed by: INTERNAL MEDICINE

## 2022-09-19 PROCEDURE — G8417 CALC BMI ABV UP PARAM F/U: HCPCS | Performed by: INTERNAL MEDICINE

## 2022-09-19 PROCEDURE — 1090F PRES/ABSN URINE INCON ASSESS: CPT | Performed by: INTERNAL MEDICINE

## 2022-09-19 PROCEDURE — 1123F ACP DISCUSS/DSCN MKR DOCD: CPT | Performed by: INTERNAL MEDICINE

## 2022-09-19 PROCEDURE — G8400 PT W/DXA NO RESULTS DOC: HCPCS | Performed by: INTERNAL MEDICINE

## 2022-09-19 RX ORDER — CYCLOBENZAPRINE HCL 5 MG
TABLET ORAL
COMMUNITY
Start: 2022-09-13

## 2022-09-19 NOTE — PROGRESS NOTES
Chief Complaint   Patient presents with    Pre-op Exam   Former pat of Dr. Aaron Walden  Patient with past medical history of HTN, dyslipidemia is here for the follow-up after hospital discharge. She was admitted with TIA. Negative neuro work up. No ASD or PFO. ILR was inserted for arrhythmias assessment in view of CVA      9 months F/u    Pre-op for EGD and colonoscopy with Dr Nani Huber on 10-5 & 10-. EKG done today.     Denied cp, palpitations,or dizziness     No intermittent Claudication    Sob on exertion    On wheelchair      Patient Active Problem List   Diagnosis    Cryptogenic stroke ([address]    Spondylolisthesis of lumbar region    Degenerative lumbar spinal stenosis    Morbid obesity due to excess calories ([address]    History of CVA (cerebrovascular accident)    Postoperative anemia due to acute blood loss    Hypertension    Diabetes mellitus ([address]    Episodic confusion    Spinal stenosis, lumbar region, without neurogenic claudication    Diabetes mellitus type 2 in obese (HCC)    Shortness of breath    Acute CVA (cerebrovascular accident) ([address]    Paresthesia    Bilateral leg edema +1 chronic    SOB (shortness of breath) on exertion    Claudication in peripheral vascular disease (HCC)    Numbness    PAD (peripheral artery disease) ([address]    S/P angioplasty with Balloon of the RT SFA now 2019    Pure hypercholesterolemia    COVID-19    Abnormal EKG    Pre-op evaluation for EGD and colonoscopy    Mild aortic stenosis       Past Surgical History:   Procedure Laterality Date    BACK SURGERY      COLONOSCOPY      ENDOSCOPY, COLON, DIAGNOSTIC      JOINT REPLACEMENT Bilateral     knees    KNEE SURGERY  2006    x2    SC ECHO TRANSESOPHAG R-T 2D IMG ACQUISJ I&R ONLY Left 6/8/2018    TRANSESOPHAGEAL ECHOCARDIOGRAM performed by Freddie Joyce MD at [address] [place] [address] Right 06/2019    x3    VASCULAR SURGERY      left carotid       Allergies   Allergen Reactions    Lisinopril      Other reaction(s): cough    Codeine Nausea And Vomiting     Patient states it can make her vomit. Vicodin [Hydrocodone-Acetaminophen] Nausea And Vomiting        Family History   Problem Relation Age of Onset    Stroke Mother     Heart Disease Father     Heart Attack Father     Cancer Sister     Cancer Sister     Diabetes Neg Hx         Social History     Socioeconomic History    Marital status:      Spouse name: Fernando Penny    Number of children: 4    Years of education: Not on file    Highest education level: Not on file   Occupational History    Occupation: disability   Tobacco Use    Smoking status: Never    Smokeless tobacco: Never   Vaping Use    Vaping Use: Never used   Substance and Sexual Activity    Alcohol use: Never    Drug use: No    Sexual activity: Yes     Partners: Male   Other Topics Concern    Not on file   Social History Narrative    Not on file     Social Determinants of Health     Financial Resource Strain: Not on file   Food Insecurity: Not on file   Transportation Needs: Not on file   Physical Activity: Not on file   Stress: Not on file   Social Connections: Not on file   Intimate Partner Violence: Not on file   Housing Stability: Not on file       Current Outpatient Medications   Medication Sig Dispense Refill    cyclobenzaprine (FLEXERIL) 5 MG tablet TAKE 1 TABLET BY MOUTH UP TO THREE TIMES DAILY AS NEEDED FOR MUSCLE SPASMS      pantoprazole (PROTONIX) 40 MG tablet Take 1 tablet by mouth every morning (before breakfast) 30 tablet 5    polyethylene glycol (MIRALAX) 17 GM/SCOOP powder 2 Day colonoscopy prep. Mix one-half bottle with 64 oz clear liquids on Day 1 and drink per instructions. Mix remaining one-half of bottle with 64 oz clear liquids on Day 2 and drink per instructions. 500 g 0    Sennosides (SENNA) 8.6 MG CAPS Take 15 tablets by mouth per instructions on Day 1.   Take 15 tablets by mouth per instructions on Day 2. 30 capsule 0    vitamin C (ASCORBIC ACID) 500 MG tablet Take 500 mg by mouth daily      VITAMIN A PO Take by mouth      allopurinol (ZYLOPRIM) 300 MG tablet Take 300 mg by mouth daily      spironolactone (ALDACTONE) 25 MG tablet Take 25 mg by mouth daily      losartan (COZAAR) 50 MG tablet Take 50 mg by mouth daily      potassium chloride (KLOR-CON) 10 MEQ extended release tablet Take 10 mEq by mouth daily      Semaglutide,0.25 or 0.5MG/DOS, 2 MG/1.5ML SOPN Inject into the skin      glimepiride (AMARYL) 2 MG tablet Take 2 mg by mouth 2 times daily      colchicine (COLCRYS) 0.6 MG tablet Take 0.6 mg by mouth daily      insulin glargine (LANTUS) 100 UNIT/ML injection vial Inject 25 Units into the skin 2 times daily 1 vial 3    insulin lispro (HUMALOG) 100 UNIT/ML injection vial Inject 0-18 Units into the skin 3 times daily (with meals) 1 vial 3    insulin lispro (HUMALOG) 100 UNIT/ML injection vial Inject 0-9 Units into the skin nightly 1 vial 3    miconazole (MICOTIN) 2 % powder Apply topically 2 times daily. 45 g 1    zinc sulfate (ZINCATE) 220 (50 Zn) MG capsule Take 1 capsule by mouth daily 30 capsule 0    Cholecalciferol (VITAMIN D) 25 MCG TABS Take 1 tablet by mouth daily 30 tablet 0    aspirin 81 MG chewable tablet Take 1 tablet by mouth daily 30 tablet 3    Ascorbic Acid (VITAMIN C) 250 MG tablet Take 250 mg by mouth daily      amLODIPine (NORVASC) 5 MG tablet Take 10 mg by mouth daily       atenolol (TENORMIN) 100 MG tablet Take 100 mg by mouth Take 1 tab daily      Omega-3 Fatty Acids (FISH OIL) 1000 MG CAPS Take 3,000 mg by mouth 3 times daily (Patient not taking: Reported on 9/19/2022)      cetirizine (ZYRTEC) 10 MG tablet Take 10 mg by mouth daily (Patient not taking: Reported on 9/19/2022)      capsicum (ZOSTRIX) 0.075 % topical cream Apply topically 3 times daily Apply topically 3 times daily.  (Patient not taking: Reported on 9/19/2022)      atorvastatin (LIPITOR) 40 MG tablet Take 40 mg by mouth daily (Patient not taking: Reported on 9/19/2022)       No current facility-administered medications for this visit. Review of Systems -     General ROS: negative  Psychological ROS: negative  Hematological and Lymphatic ROS: No history of blood clots or bleeding disorder. Respiratory ROS: no cough, shortness of breath, or wheezing  Cardiovascular ROS: no chest pain or dyspnea on exertion  Gastrointestinal ROS: negative  Genito-Urinary ROS: no dysuria, trouble voiding, or hematuria  Musculoskeletal ROS: negative  Neurological ROS: no TIA or stroke symptoms  Dermatological ROS: negative      Blood pressure (!) 146/87, pulse 79, height 5' 8\" (1.727 m), weight (!) 366 lb 6.4 oz (166.2 kg). Physical Examination:    General appearance - alert, well appearing, and in no distress  Mental status - alert, oriented to person, place, and time  Neck - supple, no significant adenopathy, no JVD, or carotid bruits  Chest - clear to auscultation, no wheezes, rales or rhonchi, symmetric air entry  Heart - normal rate, regular rhythm, normal S1, S2, no murmurs, rubs, clicks or gallops  Abdomen - soft, nontender, nondistended, no masses or organomegaly  Neurological - alert, oriented, normal speech, no focal findings or movement disorder noted  Musculoskeletal - no joint tenderness, deformity or swelling  Extremities - peripheral pulses normal, no pedal edema, no clubbing or cyanosis  Skin - normal coloration and turgor, no rashes, no suspicious skin lesions noted    Lab  No results for input(s): CKTOTAL, CKMB, CKMBINDEX, TROPONINI in the last 72 hours.   CBC:   Lab Results   Component Value Date/Time    WBC 7.7 09/13/2022 02:31 PM    RBC 5.42 09/13/2022 02:31 PM    HGB 14.6 09/13/2022 02:31 PM    HCT 47.2 09/13/2022 02:31 PM    MCV 87.1 09/13/2022 02:31 PM    MCH 26.9 09/13/2022 02:31 PM    MCHC 30.9 09/13/2022 02:31 PM    RDW 15.7 09/13/2022 02:31 PM     09/13/2022 02:31 PM    MPV 10.7 09/13/2022 02:31 PM     BMP:    Lab Results   Component Value Date/Time     09/13/2022 02:31 PM    K 4.1 09/13/2022 02:31 PM    K 4.0 07/24/2022 10:53 AM     09/13/2022 02:31 PM    CO2 23 09/13/2022 02:31 PM    BUN 14 09/13/2022 02:31 PM    LABALBU 3.6 09/13/2022 02:31 PM    CREATININE 0.76 09/13/2022 02:31 PM    CALCIUM 9.0 09/13/2022 02:31 PM    GFRAA >60 09/13/2022 02:31 PM    LABGLOM >60 09/13/2022 02:31 PM    LABGLOM >90 07/24/2022 10:53 AM    GLUCOSE 144 09/13/2022 02:31 PM     Hepatic Function Panel:    Lab Results   Component Value Date/Time    ALKPHOS 68 09/13/2022 02:31 PM    ALT 16 09/13/2022 02:31 PM    AST 16 09/13/2022 02:31 PM    PROT 6.9 09/13/2022 02:31 PM    BILITOT 0.3 09/13/2022 02:31 PM    BILIDIR <0.2 12/17/2020 06:43 AM    LABALBU 3.6 09/13/2022 02:31 PM     Magnesium:    Lab Results   Component Value Date/Time    MG 1.6 09/13/2022 02:31 PM     Warfarin PT/INR:  No components found for: PTPATWAR, PTINRWAR  HgBA1c:    Lab Results   Component Value Date/Time    LABA1C 15.4 11/04/2020 12:42 PM     FLP:    Lab Results   Component Value Date/Time    TRIG 90 09/13/2022 02:31 PM    HDL 47 09/13/2022 02:31 PM    LDLCALC 90 06/07/2018 08:49 AM     TSH:    Lab Results   Component Value Date/Time    TSH 1.92 09/13/2022 02:31 PM       ekg 10/9/18  NSR, freq PACs    4/10/19  Nsr, FREQ PACS    ILR- INTERROGATION DONE 4/1/19  NO ATR FIB RATHER nsr WITH FREQ PACS  nO ATR FIB NOTED         Even though the ABIs are within normal limits bilaterally, there is a significant drop off in pulsatility in both popliteal arteries and trifurcation vessels bilaterally, suggesting significant stenosis both distal SFA regions. This report has been created using voice recognition software. It may contain minor errors which are inherent in voice recognition technology. Final report electronically signed by Dr. 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   Mild  atheromatous disease along the length of the vessels. COMMON ILIAC ARTERIES:  Bilaterally patent. EXTERNAL ILIAC ARTERIES:  Bilaterally patent. COMMON FEMORAL ARTERIES:  Bilaterally patent. SFA:  80% to 90% right SFA concerning stenosis throughout the mid SFA. Left SFA has mild calcification as well as about 30 to 40% stenosis in  mid segment. POPLITEAL ARTERY:  Left popliteal artery although patent has about 50%  stenosis. There is right 40% popliteal artery stenosis. TIBIOPERONEAL ARTERIES:  Anterior tibial artery and posterior tibial  artery are bilaterally patent. The left PT is diffusely diseased. The  peroneal artery is patent. PEDAL ARTERIES:  Two vessel runoff to both feet. SUMMARY:  Successful right SFA angioplasty with a 6 x 120 [place]  balloon. PLAN:  1. Bedrest.  2.  Optimal medical therapy. 3.  Risk factor management. 4.  Routine access site care. 5.  DAPT. 6.  Risk factor management. 7.  Continued surveillance of the left lower extremity. 8.  Any repeat intervention would require pedal approach or groin access  due to equipment length. All the above were explained to the patient and the patient's family. They were agreeable and amenable to the above plan. Cecile Alvarez MD     D: 11/13/2019 5:47:    ekg 12/6/19  Sinus  Rhythm  - frequent PAC s   # PACs = 4.  -Old anterior infarct. Rightward P/QRS axis and rotation -possible pulmonary disease. ABNORMAL     EKG 12/16/21  Sinus  Rhythm  - frequent PAC s   # PACs = 2.  -Poor R-wave progression -may be secondary to pulmonary disease   consider old anterior infarct. Low voltage with rightward P-axis and rotation -possible pulmonary disease. Assessment   Diagnosis Orders   1. Pre-op evaluation for EGD and colonoscopy        2. Primary hypertension  EKG 12 Lead      3. Pure hypercholesterolemia        4. PAD (peripheral artery disease) ([facility]        5. Bilateral leg edema +1 chronic        6. Mild aortic stenosis        7.  Morbid obesity due to excess calories ([facility]        8. S/P angioplasty with Balloon of the RT SFA now 2019              Plan       The  current  meds and labs reviewed    Continue the current treatment and with constant vigilance to changes in symptoms and also any potential side effects. Return for care or seek medical attention immediately if symptoms got worse and/or develop new symptoms. PRE OP FOR  egd and colonoscopy  MAY PROCEED  low risk      Echo and nuc stress negative  Chronic sob on exertion  No chest pain  May proceed for coloscopy   D/w the pat    Mild aortic stenosis  F/u down the line    LEG EDEMA +1- CHRONIC  ON LASIX PRN    HX OF ILR FOR CRYPTOGENIC cva 2010  Interrogation showed PACs    ILR interrogation SO FAR -DEC 2021  ndication was cryptogenic CVA  False pause   I do not see any  definite atrial FIB  It is Irregular appears to me due to Freq PACs noted some times in bigminy pattern and occasional pvcs noted  History of crypto stroke    Battery hit TED 1/18/22 -- LMOM for patient to call office and update. Patient taken out of [employer]. Episodes:  false pauses       PAD  No intermittent claudication  S/p angioplasty of RT SFA  Cont asa 81  Cont plavix 75 po dq    Non-compliance with f/u advised    Hypertension, on medical treatment. Seems to be under good control. Patient is compliant with medical treatment. OB ALDACTONE  K 4.3  and now 4.1   hOME bp ADVISED    Overall PAT STABLE AND DOING WELL    Discussed use, benefit, and side effects of prescribed medications. All patient questions answered. Pt voiced understanding. Instructed to continue current medications, diet and exercise. Continue risk factor modification and medical management. Patient agreed with treatment plan. Follow up as directed.       RTC in 6 months      J[facility] CaldwellBoone County Community Hospital

## 2022-10-05 ENCOUNTER — ANESTHESIA (OUTPATIENT)
Dept: ENDOSCOPY | Age: 67
End: 2022-10-05
Payer: COMMERCIAL

## 2022-10-05 ENCOUNTER — HOSPITAL ENCOUNTER (OUTPATIENT)
Age: 67
Setting detail: OUTPATIENT SURGERY
Discharge: HOME | End: 2022-10-05
Attending: INTERNAL MEDICINE | Admitting: INTERNAL MEDICINE
Payer: COMMERCIAL

## 2022-10-05 ENCOUNTER — ANESTHESIA EVENT (OUTPATIENT)
Dept: ENDOSCOPY | Age: 67
End: 2022-10-05
Payer: COMMERCIAL

## 2022-10-05 VITALS
HEART RATE: 89 BPM | HEIGHT: 68 IN | BODY MASS INDEX: 44.41 KG/M2 | TEMPERATURE: 96.8 F | RESPIRATION RATE: 18 BRPM | SYSTOLIC BLOOD PRESSURE: 128 MMHG | DIASTOLIC BLOOD PRESSURE: 60 MMHG | OXYGEN SATURATION: 95 % | WEIGHT: 293 LBS

## 2022-10-05 PROCEDURE — 7100000010 HC PHASE II RECOVERY - FIRST 15 MIN: Performed by: INTERNAL MEDICINE

## 2022-10-05 PROCEDURE — 3700000000 HC ANESTHESIA ATTENDED CARE: Performed by: INTERNAL MEDICINE

## 2022-10-05 PROCEDURE — 3700000001 HC ADD 15 MINUTES (ANESTHESIA): Performed by: INTERNAL MEDICINE

## 2022-10-05 PROCEDURE — 2580000003 HC RX 258: Performed by: INTERNAL MEDICINE

## 2022-10-05 PROCEDURE — 6360000002 HC RX W HCPCS: Performed by: REGISTERED NURSE

## 2022-10-05 PROCEDURE — 2500000003 HC RX 250 WO HCPCS: Performed by: REGISTERED NURSE

## 2022-10-05 PROCEDURE — 7100000011 HC PHASE II RECOVERY - ADDTL 15 MIN: Performed by: INTERNAL MEDICINE

## 2022-10-05 PROCEDURE — 3609027000 HC COLONOSCOPY: Performed by: INTERNAL MEDICINE

## 2022-10-05 RX ORDER — KETAMINE HYDROCHLORIDE 50 MG/ML
INJECTION, SOLUTION, CONCENTRATE INTRAMUSCULAR; INTRAVENOUS PRN
Status: DISCONTINUED | OUTPATIENT
Start: 2022-10-05 | End: 2022-10-05 | Stop reason: SDUPTHER

## 2022-10-05 RX ORDER — SODIUM CHLORIDE 9 MG/ML
25 INJECTION, SOLUTION INTRAVENOUS PRN
Status: DISCONTINUED | OUTPATIENT
Start: 2022-10-05 | End: 2022-10-05 | Stop reason: HOSPADM

## 2022-10-05 RX ORDER — MIDAZOLAM HYDROCHLORIDE 1 MG/ML
INJECTION INTRAMUSCULAR; INTRAVENOUS PRN
Status: DISCONTINUED | OUTPATIENT
Start: 2022-10-05 | End: 2022-10-05 | Stop reason: SDUPTHER

## 2022-10-05 RX ORDER — LIDOCAINE HYDROCHLORIDE 20 MG/ML
INJECTION, SOLUTION INFILTRATION; PERINEURAL PRN
Status: DISCONTINUED | OUTPATIENT
Start: 2022-10-05 | End: 2022-10-05 | Stop reason: SDUPTHER

## 2022-10-05 RX ORDER — PROPOFOL 10 MG/ML
INJECTION, EMULSION INTRAVENOUS PRN
Status: DISCONTINUED | OUTPATIENT
Start: 2022-10-05 | End: 2022-10-05 | Stop reason: SDUPTHER

## 2022-10-05 RX ADMIN — PROPOFOL 50 MG: 10 INJECTION, EMULSION INTRAVENOUS at 09:22

## 2022-10-05 RX ADMIN — PROPOFOL 50 MG: 10 INJECTION, EMULSION INTRAVENOUS at 09:19

## 2022-10-05 RX ADMIN — SODIUM CHLORIDE 25 ML: 9 INJECTION, SOLUTION INTRAVENOUS at 09:10

## 2022-10-05 RX ADMIN — LIDOCAINE HYDROCHLORIDE 100 MG: 20 INJECTION, SOLUTION INFILTRATION; PERINEURAL at 09:20

## 2022-10-05 RX ADMIN — PROPOFOL 25 MG: 10 INJECTION, EMULSION INTRAVENOUS at 09:33

## 2022-10-05 RX ADMIN — KETAMINE HYDROCHLORIDE 25 MG: 50 INJECTION, SOLUTION INTRAMUSCULAR; INTRAVENOUS at 09:19

## 2022-10-05 RX ADMIN — PROPOFOL 50 MG: 10 INJECTION, EMULSION INTRAVENOUS at 09:26

## 2022-10-05 RX ADMIN — MIDAZOLAM 1 MG: 1 INJECTION INTRAMUSCULAR; INTRAVENOUS at 09:19

## 2022-10-05 RX ADMIN — PROPOFOL 50 MG: 10 INJECTION, EMULSION INTRAVENOUS at 09:30

## 2022-10-05 ASSESSMENT — PAIN - FUNCTIONAL ASSESSMENT: PAIN_FUNCTIONAL_ASSESSMENT: NONE - DENIES PAIN

## 2022-10-05 ASSESSMENT — ENCOUNTER SYMPTOMS: SHORTNESS OF BREATH: 1

## 2022-10-05 NOTE — ANESTHESIA PRE PROCEDURE
Department of Anesthesiology  Preprocedure Note       Name:  Liudmila Dumont   Age:  69 y.o.  :  1955                                          MRN:  [id number]         Date:  10/5/2022      Surgeon: Mireya Gonzalez):  Jaylen Griffin MD    Procedure: Procedure(s):  COLONOSCOPY    Medications prior to admission:   Prior to Admission medications    Medication Sig Start Date End Date Taking? Authorizing Provider   cyclobenzaprine (FLEXERIL) 5 MG tablet TAKE 1 TABLET BY MOUTH UP TO THREE TIMES DAILY AS NEEDED FOR MUSCLE SPASMS 22   Historical Provider, MD   pantoprazole (PROTONIX) 40 MG tablet Take 1 tablet by mouth every morning (before breakfast) 22   [facility]   polyethylene glycol (MIRALAX) 17 GM/SCOOP powder 2 Day colonoscopy prep. Mix one-half bottle with 64 oz clear liquids on Day 1 and drink per instructions. Mix remaining one-half of bottle with 64 oz clear liquids on Day 2 and drink per instructions. 22   [facility], APRN - CNP   Sennosides (SENNA) 8.6 MG CAPS Take 15 tablets by mouth per instructions on Day 1. Take 15 tablets by mouth per instructions on Day 2. 22   [facility], APRN - CNP   Omega-3 Fatty Acids (FISH OIL) 1000 MG CAPS Take 3,000 mg by mouth 3 times daily  Patient not taking: Reported on 2022    Historical Provider, MD   vitamin C (ASCORBIC ACID) 500 MG tablet Take 500 mg by mouth daily    Historical Provider, MD   VITAMIN A PO Take by mouth    Historical Provider, MD   allopurinol (ZYLOPRIM) 300 MG tablet Take 300 mg by mouth daily    Historical Provider, MD   cetirizine (ZYRTEC) 10 MG tablet Take 10 mg by mouth daily  Patient not taking: Reported on 2022    Historical Provider, MD   capsicum (ZOSTRIX) 0.075 % topical cream Apply topically 3 times daily Apply topically 3 times daily.   Patient not taking: Reported on 2022    Historical Provider, MD   spironolactone (ALDACTONE) 25 MG tablet Take 25 mg by mouth daily    Historical Provider, MD   losartan (COZAAR) 50 MG tablet Take 50 mg by mouth daily    Historical Provider, MD   potassium chloride (KLOR-CON) 10 MEQ extended release tablet Take 10 mEq by mouth daily    Historical Provider, MD   Semaglutide,0.25 or 0.5MG/DOS, 2 MG/1.5ML SOPN Inject into the skin    Historical Provider, MD   glimepiride (AMARYL) 2 MG tablet Take 2 mg by mouth 2 times daily    Historical Provider, MD   atorvastatin (LIPITOR) 40 MG tablet Take 40 mg by mouth daily  Patient not taking: Reported on 9/19/2022    Historical Provider, MD   colchicine (COLCRYS) 0.6 MG tablet Take 0.6 mg by mouth daily    Historical Provider, MD   insulin glargine (LANTUS) 100 UNIT/ML injection vial Inject 25 Units into the skin 2 times daily 12/18/20   Valaria Brazil, DO   insulin lispro (HUMALOG) 100 UNIT/ML injection vial Inject 0-18 Units into the skin 3 times daily (with meals) 12/18/20   Valaria Brazil, DO   insulin lispro (HUMALOG) 100 UNIT/ML injection vial Inject 0-9 Units into the skin nightly 12/18/20   Valaria Brazil, DO   miconazole (MICOTIN) 2 % powder Apply topically 2 times daily. 12/18/20   Valaria Brazil, DO   zinc sulfate (ZINCATE) 220 (50 Zn) MG capsule Take 1 capsule by mouth daily 12/18/20   Valaria Brazil, DO   Cholecalciferol (VITAMIN D) 25 MCG TABS Take 1 tablet by mouth daily 12/18/20   Valaria Brazil, DO   aspirin 81 MG chewable tablet Take 1 tablet by mouth daily  Patient not taking: Reported on 10/3/2022 6/1/17   PATRICIO Mac - CNP   Ascorbic Acid (VITAMIN C) 250 MG tablet Take 250 mg by mouth daily    Historical Provider, MD   amLODIPine (NORVASC) 5 MG tablet Take 10 mg by mouth daily     Historical Provider, MD   atenolol (TENORMIN) 100 MG tablet Take 100 mg by mouth Take 1 tab daily    Historical Provider, MD       Current medications:    No current facility-administered medications for this encounter. Allergies:     Allergies   Allergen Reactions    Lisinopril      Other reaction(s): cough    Codeine Nausea And Vomiting     Patient states it can make her vomit.  Vicodin [Hydrocodone-Acetaminophen] Nausea And Vomiting       Problem List:    Patient Active Problem List   Diagnosis Code    Cryptogenic stroke ([facility] I63.9    Spondylolisthesis of lumbar region M43.16    Degenerative lumbar spinal stenosis M48.061    Morbid obesity due to excess calories ([facility]) E66.01    History of CVA (cerebrovascular accident) Z86.73    Postoperative anemia due to acute blood loss D62    Hypertension I10    Diabetes mellitus ([facility] E11.9    Episodic confusion R41.0    Spinal stenosis, lumbar region, without neurogenic claudication M48.061    Diabetes mellitus type 2 in obese ([facility]) E11.69, E66.9    Shortness of breath R06.02    Acute CVA (cerebrovascular accident) ([facility] I63.9    Paresthesia R20.2    Bilateral leg edema +1 chronic R60.0    SOB (shortness of breath) on exertion R06.02    Claudication in peripheral vascular disease ([facility]) I73.9    Numbness R20.0    PAD (peripheral artery disease) ([facility]) I73.9    S/P angioplasty with Balloon of the RT SFA now 2019 Z98.62    Pure hypercholesterolemia E78.00    COVID-19 U07.1    Abnormal EKG R94.31    Pre-op evaluation for EGD and colonoscopy Z01.818    Mild aortic stenosis I35.0       Past Medical History:        Diagnosis Date    Arthritis     Asthma     CAD (coronary artery disease)     Degenerative lumbar spinal stenosis 01/20/2015    Diabetes mellitus ([facility])     GERD (gastroesophageal reflux disease)     Gout     History of MI (myocardial infarction) 2004    History of stroke     Hyperlipidemia     Hypertension     Morbid obesity with body mass index of 50.0-59.9 in [facility])     Spondylolisthesis of lumbar region 01/20/2015    Bilateral L5 pars defects.     Unspecified cerebral artery occlusion with cerebral infarction 2010       Past Surgical History:        Procedure Laterality Date    BACK SURGERY      COLONOSCOPY      ENDOSCOPY, COLON, DIAGNOSTIC      JOINT REPLACEMENT Bilateral     knees    KNEE SURGERY  2006    x2    [place] ECHO TRANSESOPHAG R-T 2D IMG ACQUISJ I&R ONLY Left 6/8/2018    TRANSESOPHAGEAL ECHOCARDIOGRAM performed by Mel Rizvi MD at [address] Right 06/2019    x3    VASCULAR SURGERY      left carotid       Social History:    Social History     Tobacco Use    Smoking status: Never    Smokeless tobacco: Never   Substance Use Topics    Alcohol use: Never                                Counseling given: Not Answered      Vital Signs (Current):   Vitals:    10/03/22 1255   Height: 5' 8\" (1.727 m)                                              BP Readings from Last 3 Encounters:   09/19/22 118/79   08/24/22 (!) 161/87   07/24/22 (!) 158/75       NPO Status:                                                                                 BMI:   Wt Readings from Last 3 Encounters:   09/19/22 (!) 366 lb 6.4 oz (166.2 kg)   08/24/22 (!) 325 lb (147.4 kg)   07/24/22 (!) 325 lb (147.4 kg)     Body mass index is 55.71 kg/m².     CBC:   Lab Results   Component Value Date/Time    WBC 7.7 09/13/2022 02:31 PM    RBC 5.42 09/13/2022 02:31 PM    HGB 14.6 09/13/2022 02:31 PM    HCT 47.2 09/13/2022 02:31 PM    MCV 87.1 09/13/2022 02:31 PM    RDW 15.7 09/13/2022 02:31 PM     09/13/2022 02:31 PM       CMP:   Lab Results   Component Value Date/Time     09/13/2022 02:31 PM    K 4.1 09/13/2022 02:31 PM    K 4.0 07/24/2022 10:53 AM     09/13/2022 02:31 PM    CO2 23 09/13/2022 02:31 PM    BUN 14 09/13/2022 02:31 PM    CREATININE 0.76 09/13/2022 02:31 PM    GFRAA >60 09/13/2022 02:31 PM    LABGLOM >60 09/13/2022 02:31 PM    LABGLOM >90 07/24/2022 10:53 AM    GLUCOSE 144 09/13/2022 02:31 PM    PROT 6.9 09/13/2022 02:31 PM    CALCIUM 9.0 09/13/2022 02:31 PM    BILITOT 0.3 09/13/2022 02:31 PM    ALKPHOS 68 09/13/2022 02:31 PM    AST 16 09/13/2022 02:31 PM    ALT 16 09/13/2022 02:31 PM       POC Tests: No results for input(s): POCGLU, POCNA, POCK, POCCL, POCBUN, POCHEMO, POCHCT in the last 72 hours. Coags:   Lab Results   Component Value Date/Time    PROTIME 1.53 10/16/2011 03:09 PM    INR 1.10 11/05/2019 05:34 AM    APTT 22.1 12/12/2020 02:26 AM       HCG (If Applicable): No results found for: PREGTESTUR, PREGSERUM, HCG, HCGQUANT     ABGs: No results found for: PHART, PO2ART, WVH2TUM, OXG5LYZ, BEART, Y6ZRZOXA     Type & Screen (If Applicable):  Lab Results   Component Value Date    LABRH POS 11/05/2019       Drug/Infectious Status (If Applicable):  No results found for: HIV, HEPCAB    COVID-19 Screening (If Applicable):   Lab Results   Component Value Date/Time    COVID19 detected 12/12/2020 12:00 AM           Anesthesia Evaluation  Patient summary reviewed and Nursing notes reviewed  Airway: Mallampati: II  TM distance: <3 FB   Neck ROM: full  Mouth opening: > = 3 FB   Dental:          Pulmonary:   (+) shortness of breath:  asthma:                            Cardiovascular:    (+) hypertension:, valvular problems/murmurs: AS, CAD:, ALEXANDER:,       ECG reviewed      Echocardiogram reviewed                  Neuro/Psych:   (+) CVA:,             GI/Hepatic/Renal:   (+) GERD:, morbid obesity          Endo/Other:    (+) DiabetesType II DM, , .                 Abdominal:   (+) obese,           Vascular:   + PVD, aortic or cerebral, . Other Findings:           Anesthesia Plan      MAC     ASA 4       Induction: intravenous. Anesthetic plan and risks discussed with patient. Use of blood products discussed with patient whom. Plan discussed with attending.                     PATRICIO Zapata CRNA   10/5/2022

## 2022-10-05 NOTE — BRIEF OP NOTE
Brief Postoperative Note      Patient: Maik Flores  YOB: 1955  MRN: [id number]    Date of Procedure: 10/5/2022    Pre-Op Diagnosis: Constipation, chronic [K59.09]  H/O: GI bleed [Z87.19]  Morbid obesity with body mass index (BMI) of 45.0 to 49.9 in adult ([facility] [E66.01, Z68.42]    Post-Op Diagnosis: Diverticulosis and history of polyp in the past       Procedure(s):  COLONOSCOPY    Surgeon(s):  Sheila Ganser, MD    Assistant:  * No surgical staff found *    Anesthesia: Monitor Anesthesia Care    Estimated Blood Loss (mL): none    Complications: None    Specimens:   * No specimens in log *    Implants:  * No implants in log *      Drains: * No LDAs found *    Findings:  Diverticulosis and history of polyp in the past    Electronically signed by Sheila Ganser, MD on 10/5/2022 at 9:40 AM

## 2022-10-05 NOTE — ANESTHESIA POSTPROCEDURE EVALUATION
Department of Anesthesiology  Postprocedure Note    Patient: Joshua Rosas  MRN: [id number]  YOB: 1955  Date of evaluation: 10/5/2022      Procedure Summary     Date: 10/05/22 Room / Location: [address] [place] [address]    Anesthesia Start: 3102 Anesthesia Stop: 0144    Procedure: COLONOSCOPY Diagnosis:       Constipation, chronic      H/O: GI bleed      Morbid obesity with body mass index (BMI) of 45.0 to 49.9 in adult ([facility]      (Constipation, chronic [K59.09])      (H/O: GI bleed [Z87.19])      (Morbid obesity with body mass index (BMI) of 45.0 to 49.9 in adult ([facility] [E66.01, Z68.42])    Surgeons: Ita Gonsalez MD Responsible Provider: Jeanette Watts DO    Anesthesia Type: MAC ASA Status: 4          Anesthesia Type: No value filed.     Paul Phase I: Paul Score: 10    Paul Phase II:        Anesthesia Post Evaluation    Patient location during evaluation: PACU  Patient participation: complete - patient participated  Level of consciousness: awake  Pain score: 0  Airway patency: patent  Nausea & Vomiting: no vomiting and no nausea  Complications: no  Cardiovascular status: hemodynamically stable  Respiratory status: spontaneous ventilation and room air  Hydration status: stable

## 2022-10-05 NOTE — H&P
reflux disease), Gout, History of MI (myocardial infarction), History of stroke, Hyperlipidemia, Hypertension, Morbid obesity with body mass index of 50.0-59.9 in adult [facility]), Spondylolisthesis of lumbar region, and Unspecified cerebral artery occlusion with cerebral infarction. SOCIAL HISTORY  Social History       Tobacco History       Smoking Status  Never      Smokeless Tobacco Use  Never              Alcohol History       Alcohol Use Status  Never              Drug Use       Drug Use Status  No              Sexual Activity       Sexually Active  Yes Partners  Male                    FAMILY HISTORY     Family History   Problem Relation Age of Onset    Stroke Mother     Heart Disease Father     Heart Attack Father     Cancer Sister     Cancer Sister     Diabetes Neg Hx        SURGICAL HISTORY   has a past surgical history that includes knee surgery (2006); vascular surgery; Colonoscopy; Endoscopy, colon, diagnostic; joint replacement (Bilateral); back surgery; pr echo transesophag r-t 2d img acquisj i&r only (Left, 6/8/2018); and Revision Shoulder Arthroplasty (Right, 06/2019). Additional information:       ALLERGIES   Allergies as of 08/24/2022 - Fully Reviewed 08/24/2022   Allergen Reaction Noted    Lisinopril  03/09/2021    Codeine Nausea And Vomiting 01/21/2015    Vicodin [hydrocodone-acetaminophen] Nausea And Vomiting 05/31/2017     Additional information:       MEDICATIONS   Coumadin Use Last 7 Days [x]No []Yes  Antiplatelet drug therapy use last 7 days  [x]No []Yes  Other anticoagulant use last 7 days  [x]No []Yes  No current facility-administered medications for this encounter.     Current Outpatient Medications:     cyclobenzaprine (FLEXERIL) 5 MG tablet, TAKE 1 TABLET BY MOUTH UP TO THREE TIMES DAILY AS NEEDED FOR MUSCLE SPASMS, Disp: , Rfl:     pantoprazole (PROTONIX) 40 MG tablet, Take 1 tablet by mouth every morning (before breakfast), Disp: 30 tablet, Rfl: 5    polyethylene glycol (MIRALAX) 17 GM/SCOOP powder, 2 Day colonoscopy prep. Mix one-half bottle with 64 oz clear liquids on Day 1 and drink per instructions. Mix remaining one-half of bottle with 64 oz clear liquids on Day 2 and drink per instructions. , Disp: 500 g, Rfl: 0    Sennosides (SENNA) 8.6 MG CAPS, Take 15 tablets by mouth per instructions on Day 1. Take 15 tablets by mouth per instructions on Day 2., Disp: 30 capsule, Rfl: 0    Omega-3 Fatty Acids (FISH OIL) 1000 MG CAPS, Take 3,000 mg by mouth 3 times daily (Patient not taking: No sig reported), Disp: , Rfl:     vitamin C (ASCORBIC ACID) 500 MG tablet, Take 500 mg by mouth daily, Disp: , Rfl:     VITAMIN A PO, Take by mouth, Disp: , Rfl:     allopurinol (ZYLOPRIM) 300 MG tablet, Take 300 mg by mouth daily, Disp: , Rfl:     cetirizine (ZYRTEC) 10 MG tablet, Take 10 mg by mouth daily (Patient not taking: Reported on 9/19/2022), Disp: , Rfl:     capsicum (ZOSTRIX) 0.075 % topical cream, Apply topically 3 times daily Apply topically 3 times daily.  (Patient not taking: No sig reported), Disp: , Rfl:     spironolactone (ALDACTONE) 25 MG tablet, Take 25 mg by mouth daily, Disp: , Rfl:     losartan (COZAAR) 50 MG tablet, Take 50 mg by mouth daily, Disp: , Rfl:     potassium chloride (KLOR-CON) 10 MEQ extended release tablet, Take 10 mEq by mouth daily, Disp: , Rfl:     Semaglutide,0.25 or 0.5MG/DOS, 2 MG/1.5ML SOPN, Inject into the skin, Disp: , Rfl:     glimepiride (AMARYL) 2 MG tablet, Take 2 mg by mouth 2 times daily, Disp: , Rfl:     atorvastatin (LIPITOR) 40 MG tablet, Take 40 mg by mouth daily (Patient not taking: Reported on 9/19/2022), Disp: , Rfl:     colchicine (COLCRYS) 0.6 MG tablet, Take 0.6 mg by mouth daily, Disp: , Rfl:     insulin glargine (LANTUS) 100 UNIT/ML injection vial, Inject 25 Units into the skin 2 times daily, Disp: 1 vial, Rfl: 3    insulin lispro (HUMALOG) 100 UNIT/ML injection vial, Inject 0-18 Units into the skin 3 times daily (with meals), Disp: 1 vial, Rfl: 3 insulin lispro (HUMALOG) 100 UNIT/ML injection vial, Inject 0-9 Units into the skin nightly, Disp: 1 vial, Rfl: 3    miconazole (MICOTIN) 2 % powder, Apply topically 2 times daily. , Disp: 45 g, Rfl: 1    zinc sulfate (ZINCATE) 220 (50 Zn) MG capsule, Take 1 capsule by mouth daily, Disp: 30 capsule, Rfl: 0    Cholecalciferol (VITAMIN D) 25 MCG TABS, Take 1 tablet by mouth daily, Disp: 30 tablet, Rfl: 0    aspirin 81 MG chewable tablet, Take 1 tablet by mouth daily (Patient not taking: Reported on 10/3/2022), Disp: 30 tablet, Rfl: 3    Ascorbic Acid (VITAMIN C) 250 MG tablet, Take 250 mg by mouth daily, Disp: , Rfl:     amLODIPine (NORVASC) 5 MG tablet, Take 10 mg by mouth daily , Disp: , Rfl:     atenolol (TENORMIN) 100 MG tablet, Take 100 mg by mouth Take 1 tab daily, Disp: , Rfl:   Prior to Admission medications    Medication Sig Start Date End Date Taking? Authorizing Provider   cyclobenzaprine (FLEXERIL) 5 MG tablet TAKE 1 TABLET BY MOUTH UP TO THREE TIMES DAILY AS NEEDED FOR MUSCLE SPASMS 9/13/22   Historical Provider, MD   pantoprazole (PROTONIX) 40 MG tablet Take 1 tablet by mouth every morning (before breakfast) 8/24/22   PATRICIO Richardson CNP   polyethylene glycol (MIRALAX) 17 GM/SCOOP powder 2 Day colonoscopy prep. Mix one-half bottle with 64 oz clear liquids on Day 1 and drink per instructions. Mix remaining one-half of bottle with 64 oz clear liquids on Day 2 and drink per instructions. 8/24/22   PATRICIO Richardson CNP   Sennosides (SENNA) 8.6 MG CAPS Take 15 tablets by mouth per instructions on Day 1.   Take 15 tablets by mouth per instructions on Day 2. 8/24/22   PATRICIO Richardson CNP   Omega-3 Fatty Acids (FISH OIL) 1000 MG CAPS Take 3,000 mg by mouth 3 times daily  Patient not taking: No sig reported    Historical Provider, MD   vitamin C (ASCORBIC ACID) 500 MG tablet Take 500 mg by mouth daily    Historical Provider, MD   VITAMIN A PO Take by mouth    Historical Provider, MD allopurinol (ZYLOPRIM) 300 MG tablet Take 300 mg by mouth daily    Historical Provider, MD   cetirizine (ZYRTEC) 10 MG tablet Take 10 mg by mouth daily  Patient not taking: Reported on 9/19/2022    Historical Provider, MD   capsicum (ZOSTRIX) 0.075 % topical cream Apply topically 3 times daily Apply topically 3 times daily. Patient not taking: No sig reported    Historical Provider, MD   spironolactone (ALDACTONE) 25 MG tablet Take 25 mg by mouth daily    Historical Provider, MD   losartan (COZAAR) 50 MG tablet Take 50 mg by mouth daily    Historical Provider, MD   potassium chloride (KLOR-CON) 10 MEQ extended release tablet Take 10 mEq by mouth daily    Historical Provider, MD   Semaglutide,0.25 or 0.5MG/DOS, 2 MG/1.5ML SOPN Inject into the skin    Historical Provider, MD   glimepiride (AMARYL) 2 MG tablet Take 2 mg by mouth 2 times daily    Historical Provider, MD   atorvastatin (LIPITOR) 40 MG tablet Take 40 mg by mouth daily  Patient not taking: Reported on 9/19/2022    Historical Provider, MD   colchicine (COLCRYS) 0.6 MG tablet Take 0.6 mg by mouth daily    Historical Provider, MD   insulin glargine (LANTUS) 100 UNIT/ML injection vial Inject 25 Units into the skin 2 times daily 12/18/20   Cecilia Lundberg, DO   insulin lispro (HUMALOG) 100 UNIT/ML injection vial Inject 0-18 Units into the skin 3 times daily (with meals) 12/18/20   Cecilia Gouldin, DO   insulin lispro (HUMALOG) 100 UNIT/ML injection vial Inject 0-9 Units into the skin nightly 12/18/20   Cecilia Lundberg, DO   miconazole (MICOTIN) 2 % powder Apply topically 2 times daily.  12/18/20   Cecilia Gouldin, DO   zinc sulfate (ZINCATE) 220 (50 Zn) MG capsule Take 1 capsule by mouth daily 12/18/20   Cecilia Lundberg, DO   Cholecalciferol (VITAMIN D) 25 MCG TABS Take 1 tablet by mouth daily 12/18/20   Cecilia Gouldin, DO   aspirin 81 MG chewable tablet Take 1 tablet by mouth daily  Patient not taking: Reported on 10/3/2022 6/1/17   PATRICIO Bonner - CNP Ascorbic Acid (VITAMIN C) 250 MG tablet Take 250 mg by mouth daily    Historical Provider, MD   amLODIPine (NORVASC) 5 MG tablet Take 10 mg by mouth daily     Historical Provider, MD   atenolol (TENORMIN) 100 MG tablet Take 100 mg by mouth Take 1 tab daily    Historical Provider, MD     Additional information:       PHYSICAL:   Heart:  [x]Regular rate and rhythm  []Other:    Lungs:  [x]Clear    []Other:    Abdomen: [x]Soft    []Other:    Mental Status: [x]Alert & Oriented  []Other:        PLANNED PROCEDURE    []EGD  [x]Colonoscopy []Flex Sigmoid     Consent: I have discussed with the patient and/or the patient representative the indication, alternatives, and the possible risks and/or complications of the planned procedure and the anesthesia methods. The patient and/or patient representative appear to understand and agree to proceed. SEDATION  Please see anesthesia note. The medication Planned :  Planned agent:[x]Midazolam []Meperidine [x]Sublimaze []Morphine  []Diazepam  [x]Propofol     Airway Assessment:   See anesthesia no please     Monitoring and Safety: The patient will be placed on a cardiac monitor and vital signs, pulse oximetry and level of consciousness will be continuously evaluated throughout the procedure. The patient will be closely monitored until recovery from the medications is complete and the patient has returned to baseline status. Respiratory therapy will be on standby during the procedure. [x]Pre-procedure diagnostic studies complete and results available. Comment:    [x]Previous sedation/anesthesia experiences assessed. Comment:    [x]The patient is an appropriate candidate to undergo the planned procedure sedation and anesthesia. (Refer to nursing sedation/analgesia documentation record)  [x]Formulation and discussion of sedation/procedure plan, risks, and expectations with patient and/or responsible adult completed. [x]Patient examined immediately prior to the procedure.  (Refer to nursing sedation/analgesia documentation record)    Jeanna Talamantes MD, MD   Electronically signed

## 2022-10-05 NOTE — PROGRESS NOTES
Recovery mode, denies discomfort. Passing gas, taking fluids. Dr. Jaylyn Gillis discussed findings with pt and . Discharge instructions provided and understanding verbalized.

## 2022-10-06 NOTE — OP NOTE
[address] [place] [address]                                OPERATIVE REPORT    PATIENT NAME: Estrellita Mukherjee                     :        1955  MED REC NO:   [id number]                           ROOM:  ACCOUNT NO:   [de-identified]                           ADMIT DATE: 10/05/2022  PROVIDER:     Judit Farmer M.D.    Favio Parmarz OF PROCEDURE:  10/05/2022    INDICATIONS: patient with history of polyps and GI bleeding. Plan today for colonoscopy to evaluate. ASA CLASSIFICATION:  IV.    ESTIMATED BLOOD LOSS:  None. SURGEON:  Judit Farmer MD    OPERATIVE PROCEDURE:  The patient was brought to the GI lab. Consent  was obtained. Risks involved with the procedure were explained to the  patient. Informed consent was obtained. The patient was monitored  during the procedure with pulse oximetry, blood pressure monitoring, and  oxygen by nasal cannula. Sedation by incremental doses of IV propofol  given by the anesthesia service to achieve monitored anesthesia care. For ASA classification and medication given during the procedure, please  see Anesthesia note. PROCEDURE PERFORMED:  Colonoscopy. Digital examination revealed normal rectum. Standard [id number] was  advanced under direct vision from the rectum up to the cecum. Prep was  suboptimal.  A lot of washing done with that might be acceptable. Cecum  intubation confirmed by appendiceal orifice. Scope was withdrawn. Very  mild nonclinically significant diverticulosis. No hiatus. No colitis. No masses seen. No polyp seen. Scope withdrawn with no immediate  complication. IMPRESSION:  1. Diverticulosis. 2.  History of polyp in the past.    PLAN:  1. Soft diet. Recall colonoscopy in five years. 2.  Will schedule upper endoscopy soon.         Ksenia Patterson M.D.    D: 10/05/2022 9:54:52       T: 10/05/2022 13:07:47     [id number]  Job#: [id number]     Doc#: [id number]    CC: Orlando Brooks

## 2022-10-10 ENCOUNTER — APPOINTMENT (OUTPATIENT)
Dept: GENERAL RADIOLOGY | Age: 67
End: 2022-10-10
Payer: COMMERCIAL

## 2022-10-10 ENCOUNTER — HOSPITAL ENCOUNTER (EMERGENCY)
Age: 67
Discharge: HOME | End: 2022-10-10
Payer: COMMERCIAL

## 2022-10-10 ENCOUNTER — APPOINTMENT (OUTPATIENT)
Dept: CT IMAGING | Age: 67
End: 2022-10-10
Payer: COMMERCIAL

## 2022-10-10 VITALS
WEIGHT: 293 LBS | HEIGHT: 68 IN | SYSTOLIC BLOOD PRESSURE: 141 MMHG | BODY MASS INDEX: 44.41 KG/M2 | DIASTOLIC BLOOD PRESSURE: 97 MMHG | RESPIRATION RATE: 18 BRPM | TEMPERATURE: 98.4 F | OXYGEN SATURATION: 94 % | HEART RATE: 111 BPM

## 2022-10-10 DIAGNOSIS — M25.551 RIGHT HIP PAIN: Primary | ICD-10-CM

## 2022-10-10 DIAGNOSIS — N28.1 BILATERAL RENAL CYSTS: ICD-10-CM

## 2022-10-10 DIAGNOSIS — M99.79 NARROWING OF INTERVERTEBRAL DISC SPACE: ICD-10-CM

## 2022-10-10 LAB
ALBUMIN SERPL-MCNC: 3.5 G/DL (ref 3.5–5.1)
ALP BLD-CCNC: 66 U/L (ref 38–126)
ALT SERPL-CCNC: 15 U/L (ref 11–66)
ANION GAP SERPL CALCULATED.3IONS-SCNC: 12 MEQ/L (ref 8–16)
AST SERPL-CCNC: 17 U/L (ref 5–40)
BASOPHILS # BLD: 0.9 %
BASOPHILS ABSOLUTE: 0.1 THOU/MM3 (ref 0–0.1)
BILIRUB SERPL-MCNC: 0.5 MG/DL (ref 0.3–1.2)
BILIRUBIN URINE: NEGATIVE
BLOOD, URINE: NEGATIVE
BUN BLDV-MCNC: 10 MG/DL (ref 7–22)
CALCIUM SERPL-MCNC: 9.4 MG/DL (ref 8.5–10.5)
CHARACTER, URINE: CLEAR
CHLORIDE BLD-SCNC: 99 MEQ/L (ref 98–111)
CO2: 25 MEQ/L (ref 23–33)
COLOR: YELLOW
CREAT SERPL-MCNC: 0.7 MG/DL (ref 0.4–1.2)
EOSINOPHIL # BLD: 1.1 %
EOSINOPHILS ABSOLUTE: 0.1 THOU/MM3 (ref 0–0.4)
ERYTHROCYTE [DISTWIDTH] IN BLOOD BY AUTOMATED COUNT: 15.2 % (ref 11.5–14.5)
ERYTHROCYTE [DISTWIDTH] IN BLOOD BY AUTOMATED COUNT: 47.1 FL (ref 35–45)
GFR SERPL CREATININE-BSD FRML MDRD: > 90 ML/MIN/1.73M2
GLUCOSE BLD-MCNC: 169 MG/DL (ref 70–108)
GLUCOSE URINE: NEGATIVE MG/DL
HCT VFR BLD CALC: 46 % (ref 37–47)
HEMOGLOBIN: 14.7 GM/DL (ref 12–16)
IMMATURE GRANS (ABS): 0.03 THOU/MM3 (ref 0–0.07)
IMMATURE GRANULOCYTES: 0.3 %
KETONES, URINE: NEGATIVE
LEUKOCYTE ESTERASE, URINE: NEGATIVE
LYMPHOCYTES # BLD: 20.1 %
LYMPHOCYTES ABSOLUTE: 2.1 THOU/MM3 (ref 1–4.8)
MAGNESIUM: 1.8 MG/DL (ref 1.6–2.4)
MCH RBC QN AUTO: 27.1 PG (ref 26–33)
MCHC RBC AUTO-ENTMCNC: 32 GM/DL (ref 32.2–35.5)
MCV RBC AUTO: 84.9 FL (ref 81–99)
MONOCYTES # BLD: 8.1 %
MONOCYTES ABSOLUTE: 0.8 THOU/MM3 (ref 0.4–1.3)
NITRITE, URINE: NEGATIVE
NUCLEATED RED BLOOD CELLS: 0 /100 WBC
OSMOLALITY CALCULATION: 274.9 MOSMOL/KG (ref 275–300)
PH UA: 7 (ref 5–9)
PLATELET # BLD: 235 THOU/MM3 (ref 130–400)
PMV BLD AUTO: 10.3 FL (ref 9.4–12.4)
POTASSIUM REFLEX MAGNESIUM: 4.3 MEQ/L (ref 3.5–5.2)
PROTEIN UA: NEGATIVE
RBC # BLD: 5.42 MILL/MM3 (ref 4.2–5.4)
SEG NEUTROPHILS: 69.5 %
SEGMENTED NEUTROPHILS ABSOLUTE COUNT: 7.1 THOU/MM3 (ref 1.8–7.7)
SODIUM BLD-SCNC: 136 MEQ/L (ref 135–145)
SPECIFIC GRAVITY, URINE: 1.01 (ref 1–1.03)
TOTAL PROTEIN: 6.9 G/DL (ref 6.1–8)
TROPONIN T: < 0.01 NG/ML
UROBILINOGEN, URINE: 0.2 EU/DL (ref 0–1)
WBC # BLD: 10.2 THOU/MM3 (ref 4.8–10.8)

## 2022-10-10 PROCEDURE — 83735 ASSAY OF MAGNESIUM: CPT

## 2022-10-10 PROCEDURE — 36415 COLL VENOUS BLD VENIPUNCTURE: CPT

## 2022-10-10 PROCEDURE — 80053 COMPREHEN METABOLIC PANEL: CPT

## 2022-10-10 PROCEDURE — 6370000000 HC RX 637 (ALT 250 FOR IP): Performed by: PHYSICIAN ASSISTANT

## 2022-10-10 PROCEDURE — 96374 THER/PROPH/DIAG INJ IV PUSH: CPT

## 2022-10-10 PROCEDURE — 85025 COMPLETE CBC W/AUTO DIFF WBC: CPT

## 2022-10-10 PROCEDURE — 93005 ELECTROCARDIOGRAM TRACING: CPT | Performed by: PHYSICIAN ASSISTANT

## 2022-10-10 PROCEDURE — 6360000002 HC RX W HCPCS: Performed by: PHYSICIAN ASSISTANT

## 2022-10-10 PROCEDURE — 76376 3D RENDER W/INTRP POSTPROCES: CPT

## 2022-10-10 PROCEDURE — 81003 URINALYSIS AUTO W/O SCOPE: CPT

## 2022-10-10 PROCEDURE — 84484 ASSAY OF TROPONIN QUANT: CPT

## 2022-10-10 PROCEDURE — 71046 X-RAY EXAM CHEST 2 VIEWS: CPT

## 2022-10-10 PROCEDURE — 96375 TX/PRO/DX INJ NEW DRUG ADDON: CPT

## 2022-10-10 PROCEDURE — 99285 EMERGENCY DEPT VISIT HI MDM: CPT

## 2022-10-10 PROCEDURE — 74176 CT ABD & PELVIS W/O CONTRAST: CPT

## 2022-10-10 RX ORDER — NAPROXEN 375 MG/1
375 TABLET ORAL 2 TIMES DAILY WITH MEALS
Qty: 20 TABLET | Refills: 0 | Status: SHIPPED | OUTPATIENT
Start: 2022-10-10

## 2022-10-10 RX ORDER — MORPHINE SULFATE 4 MG/ML
4 INJECTION, SOLUTION INTRAMUSCULAR; INTRAVENOUS ONCE
Status: COMPLETED | OUTPATIENT
Start: 2022-10-10 | End: 2022-10-10

## 2022-10-10 RX ORDER — ONDANSETRON 4 MG/1
4 TABLET, ORALLY DISINTEGRATING ORAL ONCE
Status: COMPLETED | OUTPATIENT
Start: 2022-10-10 | End: 2022-10-10

## 2022-10-10 RX ORDER — KETOROLAC TROMETHAMINE 30 MG/ML
15 INJECTION, SOLUTION INTRAMUSCULAR; INTRAVENOUS ONCE
Status: COMPLETED | OUTPATIENT
Start: 2022-10-10 | End: 2022-10-10

## 2022-10-10 RX ORDER — OXYCODONE HYDROCHLORIDE AND ACETAMINOPHEN 5; 325 MG/1; MG/1
1 TABLET ORAL EVERY 6 HOURS PRN
Qty: 12 TABLET | Refills: 0 | Status: SHIPPED | OUTPATIENT
Start: 2022-10-10 | End: 2022-10-13

## 2022-10-10 RX ADMIN — ONDANSETRON 4 MG: 4 TABLET, ORALLY DISINTEGRATING ORAL at 11:11

## 2022-10-10 RX ADMIN — MORPHINE SULFATE 4 MG: 4 INJECTION, SOLUTION INTRAMUSCULAR; INTRAVENOUS at 11:11

## 2022-10-10 RX ADMIN — KETOROLAC TROMETHAMINE 15 MG: 30 INJECTION, SOLUTION INTRAMUSCULAR; INTRAVENOUS at 15:18

## 2022-10-10 ASSESSMENT — PAIN SCALES - GENERAL
PAINLEVEL_OUTOF10: 9

## 2022-10-10 ASSESSMENT — PAIN - FUNCTIONAL ASSESSMENT
PAIN_FUNCTIONAL_ASSESSMENT: 0-10
PAIN_FUNCTIONAL_ASSESSMENT: NONE - DENIES PAIN
PAIN_FUNCTIONAL_ASSESSMENT: NONE - DENIES PAIN

## 2022-10-10 ASSESSMENT — PAIN DESCRIPTION - ORIENTATION
ORIENTATION: RIGHT
ORIENTATION: RIGHT

## 2022-10-10 ASSESSMENT — PAIN DESCRIPTION - LOCATION
LOCATION: HIP;CHEST
LOCATION: HIP

## 2022-10-10 ASSESSMENT — ENCOUNTER SYMPTOMS: COLOR CHANGE: 1

## 2022-10-10 NOTE — ED NOTES
Patient presents to the ED via [facility] for right hip pain and a burning sensation in her chest. There's no known injury to the right hip. She has no other complaints at this time.       Pao Wesley LPN  24/60/01 0388

## 2022-10-10 NOTE — ED NOTES
Pt. Resting in bed with even and unlabored respirations. Pt. Resting with eyes closed. Pt. Has no further concerns, questions or needs at this time. Call light within reach.         Kade Loya RN  10/10/22 6425

## 2022-10-10 NOTE — ED NOTES
Pt. Resting in bed with even and unlabored respirations. Pt. States pain is a 9/10 at this time. Pt medicated per mar. Pt. Updated about plan of care and treatment. Pt. Has no further concerns, questions or needs at this time. Call light within reach.         Justin Quarles RN  10/10/22 2551

## 2022-10-10 NOTE — ED NOTES
Discharge instructions and new medications discussed and explained with Pt. Pt. Verbalized understanding and has no further questions or needs at this time.         Kwaku Peoples RN  10/10/22 7397
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
## 2022-10-10 NOTE — ED NOTES
Patient  called to check on patient, however, there are no patient phones in the rooms. Primary nurse is with another patient. This nurse checked on patient and informed of  concerns. Patient states \"tell him I am doing just fine and thank you for checking on me\". This nurse updated . Per request,  wanted direct ED phone number to call back. Number given.       Eden Chapin RN  10/10/22 1045

## 2022-10-10 NOTE — ED NOTES
Pt states she is unable to urinate at this time. Pt. Resting in bed with even and unlabored respirations. Pt. Denies any pain at this time. Pt. Updated about plan of care and treatment. Pt. Has no further concerns, questions or needs at this time. Call light within reach.         Sathya Pineda RN  10/10/22 9413

## 2022-10-11 LAB
EKG ATRIAL RATE: 90 BPM
EKG P AXIS: 40 DEGREES
EKG P-R INTERVAL: 182 MS
EKG Q-T INTERVAL: 398 MS
EKG QRS DURATION: 92 MS
EKG QTC CALCULATION (BAZETT): 486 MS
EKG R AXIS: 34 DEGREES
EKG T AXIS: 20 DEGREES
EKG VENTRICULAR RATE: 90 BPM

## 2022-10-11 PROCEDURE — 93010 ELECTROCARDIOGRAM REPORT: CPT | Performed by: INTERNAL MEDICINE

## 2022-10-11 NOTE — ED PROVIDER NOTES
[facility]  eMERGENCY dEPARTMENT eNCOUnter          [address] [facility]       Chief Complaint   Patient presents with    Hip Pain     right    Chest Pain       Nurses Notes reviewed and I agree except as noted inthe HPI. HISTORY OF PRESENT ILLNESS    Janet Saeed is a 69 y.o. female who presents to the Emergency Department for the evaluation of hip pain and chest pain. Patient states that she has some ongoing stinging and burning sensation to the chest which is been present chronically secondary to a prior ILR for cardiac monitoring. States she has discussed with cardiology in the past and denied any specific change in that symptom today. She also complains of some burning pain in the hip that started last night. States she got up to walk to the restroom when her hip gave out. She was able to catch herself on the dresser and did not fall but states that since that time, it locks up whenever she tries to walk and she has severe pain. States that radiates to the flank and the groin on that side. She has tried Beng[place] and lidocaine patch at home without relief. Denies any associated fevers, chills, nausea, vomiting, cough, hematuria or history of kidney stones. She is a diabetic, states her glucose is typically in the 90s and was in the low 100s today. The HPI was provided by the patient. REVIEW OF SYSTEMS     Review of Systems   Genitourinary:  Positive for pelvic pain. Skin:  Positive for color change. All other systems reviewed and are negative.     PAST MEDICAL HISTORY    has a past medical history of Arthritis, Asthma, CAD (coronary artery disease), Degenerative lumbar spinal stenosis, Diabetes mellitus ([address] GERD (gastroesophageal reflux disease), Gout, History of MI (myocardial infarction), History of stroke, Hyperlipidemia, Hypertension, Morbid obesity with body mass index of 50.0-59.9 in adult [facility]), Spondylolisthesis of lumbar region, and Unspecified cerebral artery occlusion with cerebral infarction. SURGICAL HISTORY      has a past surgical history that includes knee surgery (2006); vascular surgery; Colonoscopy; Endoscopy, colon, diagnostic; joint replacement (Bilateral); back surgery; pr echo transesophag r-t 2d img acquisj i&r only (Left, 6/8/2018); Revision Shoulder Arthroplasty (Right, 06/2019); and Colonoscopy (N/A, 10/5/2022). CURRENT MEDICATIONS       Discharge Medication List as of 10/10/2022  3:15 PM        CONTINUE these medications which have NOT CHANGED    Details   cyclobenzaprine (FLEXERIL) 5 MG tablet TAKE 1 TABLET BY MOUTH UP TO THREE TIMES DAILY AS NEEDED FOR MUSCLE SPASMSHistorical Med      pantoprazole (PROTONIX) 40 MG tablet Take 1 tablet by mouth every morning (before breakfast), Disp-30 tablet, R-5Normal      polyethylene glycol (MIRALAX) 17 GM/SCOOP powder 2 Day colonoscopy prep. Mix one-half bottle with 64 oz clear liquids on Day 1 and drink per instructions. Mix remaining one-half of bottle with 64 oz clear liquids on Day 2 and drink per instructions. , Disp-500 g, R-0Normal      Sennosides (SENNA) 8.6 MG CAPS Take 15 tablets by mouth per instructions on Day 1. Take 15 tablets by mouth per instructions on Day 2., Disp-30 capsule, R-0Normal      Omega-3 Fatty Acids (FISH OIL) 1000 MG CAPS Take 3,000 mg by mouth 3 times dailyHistorical Med      !! vitamin C (ASCORBIC ACID) 500 MG tablet Take 500 mg by mouth dailyHistorical Med      VITAMIN A PO Take by mouthHistorical Med      allopurinol (ZYLOPRIM) 300 MG tablet Take 300 mg by mouth dailyHistorical Med      cetirizine (ZYRTEC) 10 MG tablet Take 10 mg by mouth dailyHistorical Med      capsicum (ZOSTRIX) 0.075 % topical cream Apply topically 3 times daily Apply topically 3 times daily. , Topical, 3 TIMES DAILY, Historical Med      spironolactone (ALDACTONE) 25 MG tablet Take 25 mg by mouth dailyHistorical Med      losartan (COZAAR) 50 MG tablet Take 50 mg by mouth dailyHistorical Med potassium chloride (KLOR-CON) 10 MEQ extended release tablet Take 10 mEq by mouth dailyHistorical Med      Semaglutide,0.25 or 0.5MG/DOS, 2 MG/1.5ML SOPN Inject into the skinHistorical Med      glimepiride (AMARYL) 2 MG tablet Take 2 mg by mouth 2 times dailyHistorical Med      atorvastatin (LIPITOR) 40 MG tablet Take 40 mg by mouth dailyHistorical Med      colchicine (COLCRYS) 0.6 MG tablet Take 0.6 mg by mouth dailyHistorical Med      insulin glargine (LANTUS) 100 UNIT/ML injection vial Inject 25 Units into the skin 2 times daily, Disp-1 vial, R-3Normal      !! insulin lispro (HUMALOG) 100 UNIT/ML injection vial Inject 0-18 Units into the skin 3 times daily (with meals), Disp-1 vial, R-3Normal      !! insulin lispro (HUMALOG) 100 UNIT/ML injection vial Inject 0-9 Units into the skin nightly, Disp-1 vial, R-3Normal      miconazole (MICOTIN) 2 % powder Apply topically 2 times daily. , Disp-45 g, R-1, Normal      zinc sulfate (ZINCATE) 220 (50 Zn) MG capsule Take 1 capsule by mouth daily, Disp-30 capsule, R-0OTC      Cholecalciferol (VITAMIN D) 25 MCG TABS Take 1 tablet by mouth daily, Disp-30 tablet, R-0Labeling may look different. 25 mcg=1000 Units. Please double check dosages. Normal      aspirin 81 MG chewable tablet Take 1 tablet by mouth daily, Disp-30 tablet, R-3OTC      !! Ascorbic Acid (VITAMIN C) 250 MG tablet Take 250 mg by mouth dailyHistorical Med      amLODIPine (NORVASC) 5 MG tablet Take 10 mg by mouth daily Historical Med      atenolol (TENORMIN) 100 MG tablet Take 100 mg by mouth Take 1 tab dailyHistorical Med       !! - Potential duplicate medications found. Please discuss with provider. ALLERGIES     is allergic to lisinopril, codeine, and vicodin [hydrocodone-acetaminophen]. FAMILY HISTORY     She indicated that her mother is . She indicated that her father is . She indicated that both of her sisters are .  She indicated that the status of her neg hx is unknown.   family history includes Cancer in her sister and sister; Heart Attack in her father; Heart Disease in her father; Stroke in her mother. SOCIAL HISTORY      reports that she has never smoked. She has never used smokeless tobacco. She reports that she does not drink alcohol and does not use drugs. PHYSICAL EXAM     INITIAL VITALS:  height is 5' 8\" (1.727 m) and weight is 325 lb (147.4 kg) (abnormal). Her oral temperature is 98.4 °F (36.9 °C). Her blood pressure is 141/97 (abnormal) and her pulse is 111 (abnormal). Her respiration is 18 and oxygen saturation is 94%. Physical Exam  Vitals and nursing note reviewed. Constitutional:       Appearance: She is obese. HENT:      Head: Normocephalic and atraumatic. Eyes:      Conjunctiva/sclera: Conjunctivae normal.   Cardiovascular:      Rate and Rhythm: Normal rate. Pulmonary:      Effort: Pulmonary effort is normal. No respiratory distress. Chest:      Chest wall: Tenderness (Noted locally to keloid over lower sternum) present. Abdominal:      Palpations: Abdomen is soft. Tenderness: There is no guarding. Comments: Focal examination of anatomy somewhat limited secondary to body habitus but patient is generally tender to palpation along the right hip and into the right abdomen   Musculoskeletal:      Cervical back: Normal range of motion. Comments: Feet are warm, with intact pulses bilaterally   Skin:     General: Skin is warm and dry. Neurological:      General: No focal deficit present. Mental Status: She is alert. Psychiatric:         Mood and Affect: Mood normal.       DIFFERENTIAL DIAGNOSIS:   Differential diagnoses are discussed    DIAGNOSTIC RESULTS     EKG: All EKG's are interpreted by the Emergency Department Physician who either signs or Co-signsthis chart in the absence of a cardiologist.    Vent.  Rate: 90 bpm  PRinterval: 182 ms  QRS duration: 92 ms  QTc: 486 ms  P-R-T axes: 40, 34, 20  Sinus rhythm with occasional PVCs and PACs. No STEMI. Compared to old EKG on 7-24-22      RADIOLOGY: non-plain film images(s) such as CT, Ultrasound and MRI are read by the radiologist.    CT ABDOMEN PELVIS WO CONTRAST Additional Contrast? None   Final Result   1. Possible left renal mass or cyst. Nonemergent follow-up contrast enhanced CT recommended. 2. Probable Bosniak 2 or 2F cyst right kidney. 3. No acute abdominal or pelvic abnormalities. **This report has been created using voice recognition software. It may contain minor errors which are inherent in voice recognition technology. **      Final report electronically signed by Dr. Zayda Greenberg on 10/10/2022 1:03 PM      CT LUMBAR RECONSTRUCTION WO POST PROCESS   Final Result    IMPRESSION:  No acute process. **This report has been created using voice recognition software. It may contain minor errors which are inherent in voice recognition technology. **      Final report electronically signed by Dr. Zayda Greenberg on 10/10/2022 1:35 PM      XR CHEST (2 VW)   Final Result   Stable radiographic appearance of the chest. No evidence of an acute process. **This report has been created using voice recognition software. It may contain minor errors which are inherent in voice recognition technology. **      Final report electronically signed by Dr. Zayda Greenberg on 10/10/2022 10:20 AM          LABS:      Labs Reviewed   CBC WITH AUTO DIFFERENTIAL - Abnormal; Notable for the following components:       Result Value    RBC 5.42 (*)     MCHC 32.0 (*)     RDW-CV 15.2 (*)     RDW-SD 47.1 (*)     All other components within normal limits   COMPREHENSIVE METABOLIC PANEL W/ REFLEX TO MG FOR LOW K - Abnormal; Notable for the following components:    Glucose 169 (*)     All other components within normal limits   OSMOLALITY - Abnormal; Notable for the following components:    Osmolality Calc 274.9 (*)     All other components within normal limits   TROPONIN MAGNESIUM   URINALYSIS WITH REFLEX TO CULTURE   ANION GAP   GLOMERULAR FILTRATION RATE, ESTIMATED       EMERGENCY DEPARTMENT COURSE:   Vitals:    Vitals:    10/10/22 1206 10/10/22 1258 10/10/22 1403 10/10/22 1515   BP: (!) 146/81 (!) 155/64 (!) 140/67 (!) 141/97   Pulse: 98 90 90 (!) 111   Resp: 19 19 16 18   Temp:       TempSrc:       SpO2: 93% 95% 93% 94%   Weight:       Height:          8:30 PM EDT: The patient was seen and evaluated. Patient presents for complaints of some chronic pain to the site of previous procedure in the lower sternum as well as some right hip pain. Denied any fall or injury associated with the onset of pain but pain worsens with any attempted standing or weightbearing. She is visibly uncomfortable with repositioning, standing in the ED. Reports minimal need to walk at home, stating her restroom is only a few steps away from her bed. Laboratory studies and urinalysis are noncontributory. Chest x-ray unremarkable. Imaging of the abdomen shows possible left renal mass or cyst, right renal cyst but no other acute changes. She does have some disc space narrowing and vacuum disc phenomenon noted to the lumbar spine as well as some retrolisthesis of L5 on S1. She was treated in the department with morphine, Zofran and subsequently Toradol. She had reported some improvement in pain after morphine but still considerable pain upon attempts to ambulate or reposition. I did discuss potential additional imaging or inpatient management but she reports that she has plenty of support at home and feels it is not a safety concern. She was able to independently transfer from the wheelchair to the cot here, though it did take several attempts for her to successfully stand from the chair. Will discharge with short course of pain control. Close return precautions are discussed.   She should follow-up outpatient with PCP in regards to the new CT changes and the severity of her pain or return to the ED should symptoms worsen. CRITICAL CARE:   None    CONSULTS:  None    PROCEDURES:  None    FINAL IMPRESSION      1. Right hip pain    2. Narrowing of intervertebral disc space    3. Bilateral renal cysts          DISPOSITION/PLAN   Discharge    PATIENT REFERRED TO:  PATRICIO Ames NP  [address] [id number] [phone]    In 3 days      [address]  [address] [facility] [phone]    As needed    [address] [facility] [address] [address] EMERGENCY DEPT  [address] [facility]  [address] [facility]  [phone]    If symptoms worsen      DISCHARGEMEDICATIONS:  Discharge Medication List as of 10/10/2022  3:15 PM        START taking these medications    Details   oxyCODONE-acetaminophen (PERCOCET) 5-325 MG per tablet Take 1 tablet by mouth every 6 hours as needed for Pain for up to 3 days. Intended supply: 3 days.  Take lowest dose possible to manage pain, Disp-12 tablet, R-0Normal      naproxen (NAPROSYN) 375 MG tablet Take 1 tablet by mouth 2 times daily (with meals), Disp-20 tablet, R-0Normal             (Please note that portions of this note were completedwith a voice recognition program.  Efforts were made to edit the dictations but occasionally words are mis-transcribed.)       Torrey Zamorano PA-C  10/10/22 3602

## 2022-10-19 PROBLEM — Z01.818 PRE-OP EVALUATION: Status: RESOLVED | Noted: 2022-09-19 | Resolved: 2022-10-19

## 2023-01-23 ENCOUNTER — APPOINTMENT (OUTPATIENT)
Dept: GENERAL RADIOLOGY | Age: 68
DRG: 308 | End: 2023-01-23
Payer: COMMERCIAL

## 2023-01-23 ENCOUNTER — HOSPITAL ENCOUNTER (INPATIENT)
Age: 68
LOS: 3 days | Discharge: HOME HEALTH | DRG: 308 | End: 2023-01-26
Attending: PHYSICIAN ASSISTANT | Admitting: PHYSICIAN ASSISTANT
Payer: COMMERCIAL

## 2023-01-23 DIAGNOSIS — R53.1 GENERAL WEAKNESS: ICD-10-CM

## 2023-01-23 DIAGNOSIS — I48.91 NEW ONSET ATRIAL FIBRILLATION (HCC): ICD-10-CM

## 2023-01-23 DIAGNOSIS — Z79.4 DIABETES MELLITUS DUE TO UNDERLYING CONDITION WITH HYPERGLYCEMIA, WITH LONG-TERM CURRENT USE OF INSULIN (HCC): ICD-10-CM

## 2023-01-23 DIAGNOSIS — R13.19 ESOPHAGEAL DYSPHAGIA: ICD-10-CM
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
## 2023-01-23 DIAGNOSIS — E08.65 DIABETES MELLITUS DUE TO UNDERLYING CONDITION WITH HYPERGLYCEMIA, WITH LONG-TERM CURRENT USE OF INSULIN (HCC): ICD-10-CM

## 2023-01-23 LAB
ALBUMIN SERPL BCG-MCNC: 3.5 G/DL (ref 3.5–5.1)
ALP SERPL-CCNC: 63 U/L (ref 38–126)
ALT SERPL W/O P-5'-P-CCNC: 13 U/L (ref 11–66)
ANION GAP SERPL CALC-SCNC: 12 MEQ/L (ref 8–16)
APTT PPP: 31.4 SECONDS (ref 22–38)
AST SERPL-CCNC: 16 U/L (ref 5–40)
BACTERIA URNS QL MICRO: ABNORMAL /HPF
BASOPHILS ABSOLUTE: 0.1 THOU/MM3 (ref 0–0.1)
BASOPHILS NFR BLD AUTO: 0.9 %
BILIRUB CONJ SERPL-MCNC: < 0.2 MG/DL (ref 0–0.3)
BILIRUB SERPL-MCNC: 0.7 MG/DL (ref 0.3–1.2)
BILIRUB UR QL STRIP.AUTO: NEGATIVE
BUN SERPL-MCNC: 8 MG/DL (ref 7–22)
CALCIUM SERPL-MCNC: 8.9 MG/DL (ref 8.5–10.5)
CASTS #/AREA URNS LPF: ABNORMAL /LPF
CASTS 2: ABNORMAL /LPF
CHARACTER UR: CLEAR
CHLORIDE SERPL-SCNC: 98 MEQ/L (ref 98–111)
CO2 SERPL-SCNC: 24 MEQ/L (ref 23–33)
COLOR: YELLOW
CREAT SERPL-MCNC: 0.8 MG/DL (ref 0.4–1.2)
CRP SERPL-MCNC: 3.09 MG/DL (ref 0–1)
CRYSTALS URNS MICRO: ABNORMAL
DEPRECATED RDW RBC AUTO: 46.7 FL (ref 35–45)
EOSINOPHIL NFR BLD AUTO: 0.1 %
EOSINOPHILS ABSOLUTE: 0 THOU/MM3 (ref 0–0.4)
EPITHELIAL CELLS, UA: ABNORMAL /HPF
ERYTHROCYTE [DISTWIDTH] IN BLOOD BY AUTOMATED COUNT: 15.7 % (ref 11.5–14.5)
FERRITIN SERPL IA-MCNC: 897 NG/ML (ref 10–291)
FLUAV RNA RESP QL NAA+PROBE: NOT DETECTED
FLUBV RNA RESP QL NAA+PROBE: NOT DETECTED
GFR SERPL CREATININE-BSD FRML MDRD: > 60 ML/MIN/1.73M2
GLUCOSE BLD STRIP.AUTO-MCNC: 186 MG/DL (ref 70–108)
GLUCOSE SERPL-MCNC: 195 MG/DL (ref 70–108)
GLUCOSE UR QL STRIP.AUTO: NEGATIVE MG/DL
HCT VFR BLD AUTO: 47 % (ref 37–47)
HGB BLD-MCNC: 15.2 GM/DL (ref 12–16)
HGB UR QL STRIP.AUTO: ABNORMAL
IMM GRANULOCYTES # BLD AUTO: 0.03 THOU/MM3 (ref 0–0.07)
IMM GRANULOCYTES NFR BLD AUTO: 0.3 %
INR PPP: 1.26 (ref 0.85–1.13)
KETONES UR QL STRIP.AUTO: NEGATIVE
LDH SERPL L TO P-CCNC: 250 U/L (ref 100–190)
LYMPHOCYTES ABSOLUTE: 1.1 THOU/MM3 (ref 1–4.8)
LYMPHOCYTES NFR BLD AUTO: 12.6 %
MCH RBC QN AUTO: 27 PG (ref 26–33)
MCHC RBC AUTO-ENTMCNC: 32.3 GM/DL (ref 32.2–35.5)
MCV RBC AUTO: 83.5 FL (ref 81–99)
MISCELLANEOUS 2: ABNORMAL
MONOCYTES ABSOLUTE: 0.6 THOU/MM3 (ref 0.4–1.3)
MONOCYTES NFR BLD AUTO: 7.4 %
MUCOUS THREADS URNS QL MICRO: ABNORMAL
NEUTROPHILS NFR BLD AUTO: 78.7 %
NITRITE UR QL STRIP: NEGATIVE
NRBC BLD AUTO-RTO: 0 /100 WBC
NT-PROBNP SERPL IA-MCNC: 2195 PG/ML (ref 0–124)
OSMOLALITY SERPL CALC.SUM OF ELEC: 271.9 MOSMOL/KG (ref 275–300)
PH UR STRIP.AUTO: 8 [PH] (ref 5–9)
PLATELET # BLD AUTO: 239 THOU/MM3 (ref 130–400)
PMV BLD AUTO: 10.5 FL (ref 9.4–12.4)
POTASSIUM SERPL-SCNC: 4.4 MEQ/L (ref 3.5–5.2)
PROT SERPL-MCNC: 6.8 G/DL (ref 6.1–8)
PROT UR STRIP.AUTO-MCNC: 30 MG/DL
RBC # BLD AUTO: 5.63 MILL/MM3 (ref 4.2–5.4)
RBC URINE: ABNORMAL /HPF
RENAL EPI CELLS #/AREA URNS HPF: ABNORMAL /[HPF]
SARS-COV-2 RNA RESP QL NAA+PROBE: DETECTED
SEGMENTED NEUTROPHILS ABSOLUTE COUNT: 6.8 THOU/MM3 (ref 1.8–7.7)
SODIUM SERPL-SCNC: 134 MEQ/L (ref 135–145)
SP GR UR REFRACT.AUTO: 1.01 (ref 1–1.03)
TROPONIN T: < 0.01 NG/ML
UROBILINOGEN, URINE: 0.2 EU/DL (ref 0–1)
WBC # BLD AUTO: 8.6 THOU/MM3 (ref 4.8–10.8)
WBC #/AREA URNS HPF: ABNORMAL /HPF
WBC #/AREA URNS HPF: NEGATIVE /[HPF]
YEAST LIKE FUNGI URNS QL MICRO: ABNORMAL

## 2023-01-23 PROCEDURE — 93005 ELECTROCARDIOGRAM TRACING: CPT | Performed by: PHYSICIAN ASSISTANT

## 2023-01-23 PROCEDURE — 86140 C-REACTIVE PROTEIN: CPT

## 2023-01-23 PROCEDURE — 99223 1ST HOSP IP/OBS HIGH 75: CPT | Performed by: PHYSICIAN ASSISTANT

## 2023-01-23 PROCEDURE — 80053 COMPREHEN METABOLIC PANEL: CPT

## 2023-01-23 PROCEDURE — 85730 THROMBOPLASTIN TIME PARTIAL: CPT

## 2023-01-23 PROCEDURE — 36415 COLL VENOUS BLD VENIPUNCTURE: CPT

## 2023-01-23 PROCEDURE — 82248 BILIRUBIN DIRECT: CPT

## 2023-01-23 PROCEDURE — 82728 ASSAY OF FERRITIN: CPT

## 2023-01-23 PROCEDURE — 82948 REAGENT STRIP/BLOOD GLUCOSE: CPT

## 2023-01-23 PROCEDURE — 85025 COMPLETE CBC W/AUTO DIFF WBC: CPT

## 2023-01-23 PROCEDURE — 85610 PROTHROMBIN TIME: CPT

## 2023-01-23 PROCEDURE — 2580000003 HC RX 258: Performed by: PHYSICIAN ASSISTANT

## 2023-01-23 PROCEDURE — 83615 LACTATE (LD) (LDH) ENZYME: CPT

## 2023-01-23 PROCEDURE — 2500000003 HC RX 250 WO HCPCS: Performed by: PHYSICIAN ASSISTANT

## 2023-01-23 PROCEDURE — 84484 ASSAY OF TROPONIN QUANT: CPT

## 2023-01-23 PROCEDURE — 99285 EMERGENCY DEPT VISIT HI MDM: CPT

## 2023-01-23 PROCEDURE — 81001 URINALYSIS AUTO W/SCOPE: CPT

## 2023-01-23 PROCEDURE — 6360000002 HC RX W HCPCS: Performed by: PHYSICIAN ASSISTANT

## 2023-01-23 PROCEDURE — 87636 SARSCOV2 & INF A&B AMP PRB: CPT

## 2023-01-23 PROCEDURE — 83880 ASSAY OF NATRIURETIC PEPTIDE: CPT

## 2023-01-23 PROCEDURE — 96374 THER/PROPH/DIAG INJ IV PUSH: CPT

## 2023-01-23 PROCEDURE — 6370000000 HC RX 637 (ALT 250 FOR IP): Performed by: PHYSICIAN ASSISTANT

## 2023-01-23 PROCEDURE — 71045 X-RAY EXAM CHEST 1 VIEW: CPT

## 2023-01-23 PROCEDURE — 1200000003 HC TELEMETRY R&B

## 2023-01-23 RX ORDER — SPIRONOLACTONE 25 MG/1
25 TABLET ORAL DAILY
Status: DISCONTINUED | OUTPATIENT
Start: 2023-01-23 | End: 2023-01-26 | Stop reason: HOSPADM

## 2023-01-23 RX ORDER — COLCHICINE 0.6 MG/1
0.6 TABLET ORAL DAILY
Status: DISCONTINUED | OUTPATIENT
Start: 2023-01-23 | End: 2023-01-26 | Stop reason: HOSPADM

## 2023-01-23 RX ORDER — ONDANSETRON 4 MG/1
4 TABLET, ORALLY DISINTEGRATING ORAL EVERY 8 HOURS PRN
Status: DISCONTINUED | OUTPATIENT
Start: 2023-01-23 | End: 2023-01-26 | Stop reason: HOSPADM

## 2023-01-23 RX ORDER — POLYETHYLENE GLYCOL 3350 17 G/17G
17 POWDER, FOR SOLUTION ORAL DAILY PRN
Status: DISCONTINUED | OUTPATIENT
Start: 2023-01-23 | End: 2023-01-26 | Stop reason: HOSPADM

## 2023-01-23 RX ORDER — HEPARIN SODIUM 1000 [USP'U]/ML
4000 INJECTION, SOLUTION INTRAVENOUS; SUBCUTANEOUS ONCE
Status: COMPLETED | OUTPATIENT
Start: 2023-01-23 | End: 2023-01-23

## 2023-01-23 RX ORDER — ACETAMINOPHEN 650 MG/1
650 SUPPOSITORY RECTAL EVERY 6 HOURS PRN
Status: DISCONTINUED | OUTPATIENT
Start: 2023-01-23 | End: 2023-01-26 | Stop reason: HOSPADM

## 2023-01-23 RX ORDER — DEXTROSE MONOHYDRATE 100 MG/ML
INJECTION, SOLUTION INTRAVENOUS CONTINUOUS PRN
Status: DISCONTINUED | OUTPATIENT
Start: 2023-01-23 | End: 2023-01-26 | Stop reason: HOSPADM

## 2023-01-23 RX ORDER — ATENOLOL 100 MG/1
100 TABLET ORAL DAILY
Status: DISCONTINUED | OUTPATIENT
Start: 2023-01-23 | End: 2023-01-24

## 2023-01-23 RX ORDER — HEPARIN SODIUM 1000 [USP'U]/ML
4000 INJECTION, SOLUTION INTRAVENOUS; SUBCUTANEOUS PRN
Status: DISCONTINUED | OUTPATIENT
Start: 2023-01-24 | End: 2023-01-26 | Stop reason: HOSPADM

## 2023-01-23 RX ORDER — AMLODIPINE BESYLATE 10 MG/1
10 TABLET ORAL DAILY
Status: DISCONTINUED | OUTPATIENT
Start: 2023-01-23 | End: 2023-01-26 | Stop reason: HOSPADM

## 2023-01-23 RX ORDER — SODIUM CHLORIDE 9 MG/ML
INJECTION, SOLUTION INTRAVENOUS PRN
Status: DISCONTINUED | OUTPATIENT
Start: 2023-01-23 | End: 2023-01-26 | Stop reason: HOSPADM

## 2023-01-23 RX ORDER — ALLOPURINOL 300 MG/1
300 TABLET ORAL DAILY
Status: DISCONTINUED | OUTPATIENT
Start: 2023-01-24 | End: 2023-01-26 | Stop reason: HOSPADM

## 2023-01-23 RX ORDER — ACETAMINOPHEN 325 MG/1
650 TABLET ORAL ONCE
Status: COMPLETED | OUTPATIENT
Start: 2023-01-23 | End: 2023-01-23

## 2023-01-23 RX ORDER — POTASSIUM CHLORIDE 750 MG/1
10 TABLET, FILM COATED, EXTENDED RELEASE ORAL DAILY
Status: DISCONTINUED | OUTPATIENT
Start: 2023-01-23 | End: 2023-01-26 | Stop reason: HOSPADM

## 2023-01-23 RX ORDER — ATORVASTATIN CALCIUM 40 MG/1
40 TABLET, FILM COATED ORAL NIGHTLY
Status: DISCONTINUED | OUTPATIENT
Start: 2023-01-23 | End: 2023-01-26 | Stop reason: HOSPADM

## 2023-01-23 RX ORDER — HEPARIN SODIUM 10000 [USP'U]/100ML
5-30 INJECTION, SOLUTION INTRAVENOUS CONTINUOUS
Status: DISCONTINUED | OUTPATIENT
Start: 2023-01-23 | End: 2023-01-26 | Stop reason: HOSPADM

## 2023-01-23 RX ORDER — SODIUM CHLORIDE 0.9 % (FLUSH) 0.9 %
5-40 SYRINGE (ML) INJECTION EVERY 12 HOURS SCHEDULED
Status: DISCONTINUED | OUTPATIENT
Start: 2023-01-23 | End: 2023-01-26 | Stop reason: HOSPADM

## 2023-01-23 RX ORDER — INSULIN LISPRO 100 [IU]/ML
0-4 INJECTION, SOLUTION INTRAVENOUS; SUBCUTANEOUS
Status: DISCONTINUED | OUTPATIENT
Start: 2023-01-24 | End: 2023-01-26 | Stop reason: HOSPADM

## 2023-01-23 RX ORDER — ASPIRIN 81 MG/1
81 TABLET, CHEWABLE ORAL DAILY
Status: DISCONTINUED | OUTPATIENT
Start: 2023-01-23 | End: 2023-01-26 | Stop reason: HOSPADM

## 2023-01-23 RX ORDER — ONDANSETRON 2 MG/ML
4 INJECTION INTRAMUSCULAR; INTRAVENOUS EVERY 6 HOURS PRN
Status: DISCONTINUED | OUTPATIENT
Start: 2023-01-23 | End: 2023-01-26 | Stop reason: HOSPADM

## 2023-01-23 RX ORDER — HEPARIN SODIUM 1000 [USP'U]/ML
2000 INJECTION, SOLUTION INTRAVENOUS; SUBCUTANEOUS PRN
Status: DISCONTINUED | OUTPATIENT
Start: 2023-01-24 | End: 2023-01-26 | Stop reason: HOSPADM

## 2023-01-23 RX ORDER — INSULIN LISPRO 100 [IU]/ML
0-4 INJECTION, SOLUTION INTRAVENOUS; SUBCUTANEOUS NIGHTLY
Status: DISCONTINUED | OUTPATIENT
Start: 2023-01-23 | End: 2023-01-26 | Stop reason: HOSPADM

## 2023-01-23 RX ORDER — PANTOPRAZOLE SODIUM 40 MG/1
40 TABLET, DELAYED RELEASE ORAL
Status: DISCONTINUED | OUTPATIENT
Start: 2023-01-24 | End: 2023-01-26 | Stop reason: HOSPADM

## 2023-01-23 RX ORDER — ACETAMINOPHEN 325 MG/1
650 TABLET ORAL EVERY 6 HOURS PRN
Status: DISCONTINUED | OUTPATIENT
Start: 2023-01-23 | End: 2023-01-26 | Stop reason: HOSPADM

## 2023-01-23 RX ORDER — INSULIN GLARGINE 100 [IU]/ML
20 INJECTION, SOLUTION SUBCUTANEOUS NIGHTLY
Status: DISCONTINUED | OUTPATIENT
Start: 2023-01-23 | End: 2023-01-26 | Stop reason: HOSPADM

## 2023-01-23 RX ORDER — LOSARTAN POTASSIUM 50 MG/1
50 TABLET ORAL DAILY
Status: DISCONTINUED | OUTPATIENT
Start: 2023-01-23 | End: 2023-01-26 | Stop reason: HOSPADM

## 2023-01-23 RX ORDER — SODIUM CHLORIDE 0.9 % (FLUSH) 0.9 %
5-40 SYRINGE (ML) INJECTION PRN
Status: DISCONTINUED | OUTPATIENT
Start: 2023-01-23 | End: 2023-01-26 | Stop reason: HOSPADM

## 2023-01-23 RX ORDER — HEPARIN SODIUM 1000 [USP'U]/ML
60 INJECTION, SOLUTION INTRAVENOUS; SUBCUTANEOUS ONCE
Status: DISCONTINUED | OUTPATIENT
Start: 2023-01-23 | End: 2023-01-23 | Stop reason: SDUPTHER

## 2023-01-23 RX ADMIN — SODIUM CHLORIDE, PRESERVATIVE FREE 10 ML: 5 INJECTION INTRAVENOUS at 23:57

## 2023-01-23 RX ADMIN — ASPIRIN 81 MG: 81 TABLET, CHEWABLE ORAL at 23:52

## 2023-01-23 RX ADMIN — SPIRONOLACTONE 25 MG: 25 TABLET ORAL at 23:57

## 2023-01-23 RX ADMIN — ATORVASTATIN CALCIUM 40 MG: 40 TABLET, FILM COATED ORAL at 23:52

## 2023-01-23 RX ADMIN — POTASSIUM CHLORIDE 10 MEQ: 750 TABLET, EXTENDED RELEASE ORAL at 23:52

## 2023-01-23 RX ADMIN — HEPARIN SODIUM 6 UNITS/KG/HR: 10000 INJECTION, SOLUTION INTRAVENOUS at 23:11

## 2023-01-23 RX ADMIN — ACETAMINOPHEN 650 MG: 325 TABLET ORAL at 15:03

## 2023-01-23 RX ADMIN — DILTIAZEM HYDROCHLORIDE 5 MG/HR: 5 INJECTION, SOLUTION INTRAVENOUS at 17:01

## 2023-01-23 RX ADMIN — HEPARIN SODIUM 4000 UNITS: 1000 INJECTION, SOLUTION INTRAVENOUS; SUBCUTANEOUS at 23:07

## 2023-01-23 RX ADMIN — ATENOLOL 100 MG: 100 TABLET ORAL at 23:53

## 2023-01-23 RX ADMIN — LOSARTAN POTASSIUM 50 MG: 50 TABLET, FILM COATED ORAL at 23:53

## 2023-01-23 RX ADMIN — COLCHICINE 0.6 MG: 0.6 TABLET, FILM COATED ORAL at 23:53

## 2023-01-23 ASSESSMENT — PAIN - FUNCTIONAL ASSESSMENT
PAIN_FUNCTIONAL_ASSESSMENT: NONE - DENIES PAIN

## 2023-01-23 ASSESSMENT — ENCOUNTER SYMPTOMS
ABDOMINAL PAIN: 0
BACK PAIN: 0
SHORTNESS OF BREATH: 0
PHOTOPHOBIA: 0
FACIAL SWELLING: 0
NAUSEA: 0
RHINORRHEA: 0
COUGH: 1
DIARRHEA: 0
VOMITING: 0

## 2023-01-23 NOTE — H&P
Hospitalist History & Physical    Patient:  Brooke Duron    Unit/Bed:08/008A  YOB: 1955  MRN: [id number]   Acct: [de-identified]   PCP: PATRICIO Crespo NP  Code Status: Full Code    Date of Service: Pt seen/examined on 01/23/23 and admitted to Inpatient with expected LOS greater than two midnights due to medical therapy. Chief Complaint: Shortness of breath, fall    Assessment/Plan:    New onset atrial fibrillation with rapid ventricular response  EKG significant for atrial fibrillation with rapid ventricular response. No ischemic changes. Troponin negative. Patient placed on Cardizem drip. Titrate Cardizem drip to goal heart rate of less than 100. [id number] 5. HAS-BLED 3-4. Patient reports past GI bleed. Unable to locate information in epic. Severity uncertain. Risks and benefits to anticoagulation were discussed at length with the patient including the benefit of stroke prevention and the risk of bleeding. Patient was agreeable to start anticoagulation. Start heparin infusion  Cardiology consulted  Echocardiogram ordered  COVID-19 infection  Currently on room air. Not a candidate for dexamethasone. Inflammatory markers ordered. Supportive care. HFpEF without acute exacerbation  Patient reports taking Lasix as needed at home approximately twice per week. Currently, patient does not appear fluid overloaded, she is on room air, and her chest x-ray does not show findings concerning for pulmonary edema. Will defer diuresis at this time. Repeat echocardiogram ordered. I's and O's. Daily weights. Low-salt diet. Fluid restriction. Hypertension  Hold amlodipine given Cardizem drip. Continue atenolol, losartan, spironolactone  Hyperlipidemia  Lipitor  Coronary artery disease  Continue aspirin, risk factor modifications  Repeat echocardiogram pending  EKG without ischemic changes. Patient denies chest pain. Troponin negative.   Peripheral artery disease  S/p angioplasty to right SFA on 11/5/2019  Continue aspirin. Aggressive risk factor modifications. Insulin-dependent diabetes mellitus  Lantus 20 units nightly. Glucose sliding scale. Hypoglycemia protocol. GERD, history of gastric metaplasia  Patient scheduled for endoscopy on 1/25/2023. Has history of gastric metaplasia on prior EGD. Continue Protonix. History of Present Illness:  Brooke Duron is a 69 y.o. female with a history of hypertension, HFpEF, hyperlipidemia, coronary artery disease, peripheral artery disease, insulin-dependent diabetes mellitus, gastroesophageal reflux disease, and history of gastric metaplasia who presented to [facility] with chief complaint of fall and shortness of breath. The patient states this morning she was attempting to sit on the commode and states she missed and fell to the ground. She was brought to the emergency department for further evaluation. On arrival, she was noted to be in atrial fibrillation with rapid ventricular response. She has no history of atrial fibrillation prior to this and had a loop recorder placed previously which did not reveal any evidence of atrial fibrillation. Incidentally, the patient tested positive for COVID-19. Her only symptom regarding her COVID-19 diagnosis is cough. She denies any shortness of breath, fever, chills, or myalgias. She denies any ongoing chest pain or palpitations. She was placed on a Cardizem drip for rate control. The patient has a history of HFpEF and takes Lasix as needed. She states she usually takes it approximately 2 times per week. She does have a remote history of GI bleed. Her last colonoscopy was late 2022 which revealed diverticular disease, but no polyps or bleeding. The patient does not recall the last time she had a GI bleed and is uncertain the severity of her bleed or what the etiology was. Review of Systems: Pertinent positives as noted in the HPI.  All other systems reviewed and negative. Past Medical History:        Diagnosis Date    Arthritis     Asthma     CAD (coronary artery disease)     Degenerative lumbar spinal stenosis 01/20/2015    Diabetes mellitus (HCC)     GERD (gastroesophageal reflux disease)     Gout     History of MI (myocardial infarction) 2004    History of stroke     Hyperlipidemia     Hypertension     Morbid obesity with body mass index of 50.0-59.9 in adult [facility])     Spondylolisthesis of lumbar region 01/20/2015    Bilateral L5 pars defects. Unspecified cerebral artery occlusion with cerebral infarction 2010       Past Surgical History:        Procedure Laterality Date    BACK SURGERY      COLONOSCOPY      COLONOSCOPY N/A 10/5/2022    COLONOSCOPY performed by Haider Lee MD at [facility]    ENDOSCOPY, COLON, DIAGNOSTIC      JOINT REPLACEMENT Bilateral     knees    KNEE SURGERY  2006    x2    IA ECHO TRANSESOPHAG R-T 2D IMG ACQUISJ I&R ONLY Left 6/8/2018    TRANSESOPHAGEAL ECHOCARDIOGRAM performed by Zay Rascon MD at [address] [place] [address] Right 06/2019    x3    VASCULAR SURGERY      left carotid       Home Medications:   No current facility-administered medications on file prior to encounter. Current Outpatient Medications on File Prior to Encounter   Medication Sig Dispense Refill    allopurinol (ZYLOPRIM) 300 MG tablet Take 300 mg by mouth daily      amLODIPine (NORVASC) 5 MG tablet Take 10 mg by mouth daily       naproxen (NAPROSYN) 375 MG tablet Take 1 tablet by mouth 2 times daily (with meals) 20 tablet 0    cyclobenzaprine (FLEXERIL) 5 MG tablet TAKE 1 TABLET BY MOUTH UP TO THREE TIMES DAILY AS NEEDED FOR MUSCLE SPASMS      pantoprazole (PROTONIX) 40 MG tablet Take 1 tablet by mouth every morning (before breakfast) 30 tablet 5    polyethylene glycol (MIRALAX) 17 GM/SCOOP powder 2 Day colonoscopy prep. Mix one-half bottle with 64 oz clear liquids on Day 1 and drink per instructions.   Mix remaining one-half of bottle with 64 oz clear liquids on Day 2 and drink per instructions. 500 g 0    Sennosides (SENNA) 8.6 MG CAPS Take 15 tablets by mouth per instructions on Day 1. Take 15 tablets by mouth per instructions on Day 2. 30 capsule 0    Omega-3 Fatty Acids (FISH OIL) 1000 MG CAPS Take 3,000 mg by mouth 3 times daily (Patient not taking: No sig reported)      vitamin C (ASCORBIC ACID) 500 MG tablet Take 500 mg by mouth daily      VITAMIN A PO Take by mouth      cetirizine (ZYRTEC) 10 MG tablet Take 10 mg by mouth daily (Patient not taking: Reported on 9/19/2022)      capsicum (ZOSTRIX) 0.075 % topical cream Apply topically 3 times daily Apply topically 3 times daily. (Patient not taking: No sig reported)      spironolactone (ALDACTONE) 25 MG tablet Take 25 mg by mouth daily      losartan (COZAAR) 50 MG tablet Take 50 mg by mouth daily      potassium chloride (KLOR-CON) 10 MEQ extended release tablet Take 10 mEq by mouth daily      Semaglutide,0.25 or 0.5MG/DOS, 2 MG/1.5ML SOPN Inject into the skin      glimepiride (AMARYL) 2 MG tablet Take 2 mg by mouth 2 times daily      atorvastatin (LIPITOR) 40 MG tablet Take 40 mg by mouth daily (Patient not taking: Reported on 9/19/2022)      colchicine (COLCRYS) 0.6 MG tablet Take 0.6 mg by mouth daily      insulin glargine (LANTUS) 100 UNIT/ML injection vial Inject 25 Units into the skin 2 times daily 1 vial 3    insulin lispro (HUMALOG) 100 UNIT/ML injection vial Inject 0-18 Units into the skin 3 times daily (with meals) 1 vial 3    insulin lispro (HUMALOG) 100 UNIT/ML injection vial Inject 0-9 Units into the skin nightly 1 vial 3    miconazole (MICOTIN) 2 % powder Apply topically 2 times daily.  45 g 1    zinc sulfate (ZINCATE) 220 (50 Zn) MG capsule Take 1 capsule by mouth daily 30 capsule 0    Cholecalciferol (VITAMIN D) 25 MCG TABS Take 1 tablet by mouth daily 30 tablet 0    aspirin 81 MG chewable tablet Take 1 tablet by mouth daily (Patient not taking: No sig reported) 30 tablet 3    Ascorbic Acid (VITAMIN C) 250 MG tablet Take 250 mg by mouth daily      atenolol (TENORMIN) 100 MG tablet Take 100 mg by mouth Take 1 tab daily         Allergies:    Lisinopril, Codeine, and Vicodin [hydrocodone-acetaminophen]    Social History:    reports that she has never smoked. She has never used smokeless tobacco. She reports that she does not drink alcohol and does not use drugs. Family History:       Problem Relation Age of Onset    Stroke Mother     Heart Disease Father     Heart Attack Father     Cancer Sister     Cancer Sister     Diabetes Neg Hx        Diet:  ADULT DIET; Regular; 4 carb choices (60 gm/meal); Low Sodium (2 gm); 2000 ml      Physical Exam:  BP (!) 156/95   Pulse 79   Temp 100.3 °F (37.9 °C) (Oral)   Resp 24   Ht 5' 8\" (1.727 m)   Wt (!) 350 lb (158.8 kg)   SpO2 93%   BMI 53.22 kg/m²   General: Alert, morbidly obese in no acute distress, cooperative  HEENT:  Normocephalic and atraumatic. No scleral icterus. Pupils equal, round, and reactive to light. External nares without deformity. External ears normal.  Neck: Supple. No JVD. No thyromegaly. No cervical lymphadenopathy. Lungs: On room air. Respiratory rate and effort normal.  Lungs clear to auscultation bilaterally. Cardiac: Irregularly irregular, tachycardic. No murmur, rubs, or gallops. Abdomen: Nondistended, soft, no tenderness to palpation, guarding, rigidity. Bowel sounds normoactive. Extremities:  No clubbing, cyanosis. 1+ pitting edema in bilateral lower extremities. Vasculature: capillary refill < 3 seconds. Lower extremity pulses 2+ bilaterally  Skin:  Warm and dry. No rashes or lesions. Psych: Mood normal.  Affect appropriate. Neurologic: Alert and oriented x4. No cranial nerve deficits. No extremity weakness.     Data: (All radiographs, tracings, PFTs, and imaging are personally viewed and interpreted unless otherwise noted)  Labs:   Recent Labs     01/23/23  1447   WBC 8.6   HGB 15.2 HCT 47.0        Recent Labs     01/23/23  1447   *   K 4.4   CL 98   CO2 24   BUN 8   CREATININE 0.8   CALCIUM 8.9     Recent Labs     01/23/23  1447   AST 16   ALT 13   BILIDIR <0.2   BILITOT 0.7   ALKPHOS 63     Recent Labs     01/23/23  1823   INR 1.26*     No results for input(s): Nancy Samayoa in the last 72 hours. Urinalysis:   Lab Results   Component Value Date/Time    NITRU NEGATIVE 01/23/2023 03:45 PM    WBCUA 0-2 01/23/2023 03:45 PM    BACTERIA FEW 01/23/2023 03:45 PM    RBCUA 0-2 01/23/2023 03:45 PM    BLOODU SMALL 01/23/2023 03:45 PM    GLUCOSEU NEGATIVE 01/23/2023 03:45 PM       EKG: Atrial fibrillation with rapid ventricular response, ventricular rate of 112, no ischemic changes    Radiology:  XR CHEST PORTABLE   Final Result   1. No interval change since previous study dated 10 of October 2022. Kristine Treviño **This report has been created using voice recognition software. It may contain minor errors which are inherent in voice recognition technology. **      Final report electronically signed by DR Claudia Rodriguez on 1/23/2023 2:49 PM        XR CHEST PORTABLE    Result Date: 1/23/2023  PROCEDURE: XR CHEST PORTABLE CLINICAL INFORMATION: sob. COMPARISON: Chest x-ray dated 10/10/2022. TECHNIQUE: AP upright view of the chest. FINDINGS: The heart size is normal.The mediastinum is not widened. There are no pulmonary infiltrates or effusions. The pulmonary vascularity is normal. There is thoracic spondylosis. There is a right shoulder replacement. 1. No interval change since previous study dated 10 of October 2022. Kristine Treviño **This report has been created using voice recognition software. It may contain minor errors which are inherent in voice recognition technology. ** Final report electronically signed by DR Claudia Rodriguez on 1/23/2023 2:49 PM        DVT prophylaxis: Heparin drip    Diet: ADULT DIET; Regular; 4 carb choices (60 gm/meal);  Low Sodium (2 gm); 2000 ml    Fluids: None    Code Status: Full Code    PT/OT Eval Status: Active and ongoing    Tele:   [x] yes             [] no      Thank you PATRICIO Galloway NP for the opportunity to be involved in this patient's care.     Electronically signed by Allyn Goodwin PA-C on 1/23/2023 at 7:22 PM

## 2023-01-23 NOTE — ED TRIAGE NOTES
Patient presents to ED via [facility] with fall that occurred today while trying to sit on the toilet. Patient reports shortness of breath with history of chronic shortness of breath. Patient denies any injury or pain. EKG obtained, telemetry applied.

## 2023-01-23 NOTE — ED NOTES
ED to inpatient nurses report    Chief Complaint   Patient presents with    Shortness of Breath    Fall      Present to ED from home  LOC: alert and orientated to name, place, date  Vital signs   Vitals:    01/23/23 1701 01/23/23 1734 01/23/23 1749 01/23/23 1843   BP: (!) 149/91 (!) 168/90 (!) 130/94 (!) 142/117   Pulse: 96 97 (!) 101 87   Resp: 25 26 21 21   Temp:       TempSrc:       SpO2: 97% 98%  95%   Weight:       Height:          Oxygen Baseline Room Air    Current needs required Room Air  LDAs:   Peripheral IV 01/23/23 Right;Dorsal Hand (Active)     Mobility: Unknown  Pending ED orders: None  Present condition: Stable      C-SSRS Risk of Suicide: No Risk  Swallow Screening  Pass  Preferred Language: [place]     Electronically signed by Lieutenant Andino RN on 1/23/2023 at 6:48 PM       Lieutenant Andino, [address] [place] [address]  01/23/23 [id number]

## 2023-01-23 NOTE — ED PROVIDER NOTES
[address] [facility] [address] [address] EMERGENCY DEPT      Pt Name: Salina Smart  MRN: [id number]  [place] 1955  Date of evaluation: 1/23/2023  Provider: Kayleigh Du PA-C    CHIEF COMPLAINT       Chief Complaint   Patient presents with    Shortness of Breath    Fall       Nurses Notes reviewed and I agree except as noted in the HPI. HISTORY OF PRESENT ILLNESS    Salina Smart is a 69 y.o. female with a history of HTN, diabetes, and gout who presents for a fall that occurred one hour ago. Patient states that she was up to use the restroom, but missed the toilet when she sat down. She fell onto her buttocks and denies hitting her head, back, shoulders, or hips on the way down. She denies any loss of consciousness, dizziness, or syncopal episode. EMS was called to help the patient off the floor and upon arrival to the ED, patient rolled out of the EMS chair and fell onto the floor on her buttocks. Patient wanted to be seen because \"I feel like my legs are giving out and I can't walk. \" She states this has happened before and was seen here a few months ago for similar leg issues. She also notes that she has had a cough recently. She denies any pain to her head, hips, chest, or legs. Patient denies any shortness of breath. Patient denies any blood thinners and has not taken any of her medications today. REVIEW OF SYSTEMS     Review of Systems   Constitutional:  Negative for appetite change, chills, diaphoresis and fever. HENT:  Negative for facial swelling and rhinorrhea. Eyes:  Negative for photophobia. Respiratory:  Positive for cough. Negative for shortness of breath. Cardiovascular:  Negative for chest pain. Gastrointestinal:  Negative for abdominal pain, diarrhea, nausea and vomiting. Genitourinary:  Negative for difficulty urinating, dysuria, frequency and urgency. Musculoskeletal:  Positive for gait problem. Negative for back pain, myalgias and neck pain. Skin:  Negative for rash and wound. Neurological:  Positive for tremors and weakness. Negative for dizziness, light-headedness, numbness and headaches. Psychiatric/Behavioral:  Negative for confusion and sleep disturbance. PAST MEDICAL HISTORY    has a past medical history of Arthritis, Asthma, CAD (coronary artery disease), Degenerative lumbar spinal stenosis, Diabetes mellitus ([address] GERD (gastroesophageal reflux disease), Gout, History of MI (myocardial infarction), History of stroke, Hyperlipidemia, Hypertension, Morbid obesity with body mass index of 50.0-59.9 in adult [facility]), Spondylolisthesis of lumbar region, and Unspecified cerebral artery occlusion with cerebral infarction. SURGICAL HISTORY      has a past surgical history that includes knee surgery (2006); vascular surgery; Colonoscopy; Endoscopy, colon, diagnostic; joint replacement (Bilateral); back surgery; pr echo transesophag r-t 2d img acquisj i&r only (Left, 6/8/2018); Revision Shoulder Arthroplasty (Right, 06/2019); and Colonoscopy (N/A, 10/5/2022). CURRENT MEDICATIONS       Current Discharge Medication List        CONTINUE these medications which have NOT CHANGED    Details   allopurinol (ZYLOPRIM) 300 MG tablet Take 300 mg by mouth daily      amLODIPine (NORVASC) 5 MG tablet Take 10 mg by mouth daily       naproxen (NAPROSYN) 375 MG tablet Take 1 tablet by mouth 2 times daily (with meals)  Qty: 20 tablet, Refills: 0      cyclobenzaprine (FLEXERIL) 5 MG tablet TAKE 1 TABLET BY MOUTH UP TO THREE TIMES DAILY AS NEEDED FOR MUSCLE SPASMS      pantoprazole (PROTONIX) 40 MG tablet Take 1 tablet by mouth every morning (before breakfast)  Qty: 30 tablet, Refills: 5    Associated Diagnoses: Gastritis with intestinal metaplasia of stomach; History of peptic ulcer disease; Morbid obesity with BMI of 45.0-49.9, adult ([facility])      polyethylene glycol (MIRALAX) 17 GM/SCOOP powder 2 Day colonoscopy prep.   Mix one-half bottle with 64 oz clear liquids on Day 1 and drink per instructions. Mix remaining one-half of bottle with 64 oz clear liquids on Day 2 and drink per instructions. Qty: 500 g, Refills: 0    Associated Diagnoses: Chronic constipation; History of rectal bleeding      Sennosides (SENNA) 8.6 MG CAPS Take 15 tablets by mouth per instructions on Day 1. Take 15 tablets by mouth per instructions on Day 2.  Qty: 30 capsule, Refills: 0    Associated Diagnoses: Chronic constipation; History of rectal bleeding      Omega-3 Fatty Acids (FISH OIL) 1000 MG CAPS Take 3,000 mg by mouth 3 times daily      !! vitamin C (ASCORBIC ACID) 500 MG tablet Take 500 mg by mouth daily      VITAMIN A PO Take by mouth      cetirizine (ZYRTEC) 10 MG tablet Take 10 mg by mouth daily      capsicum (ZOSTRIX) 0.075 % topical cream Apply topically 3 times daily Apply topically 3 times daily. spironolactone (ALDACTONE) 25 MG tablet Take 25 mg by mouth daily      losartan (COZAAR) 50 MG tablet Take 50 mg by mouth daily      potassium chloride (KLOR-CON) 10 MEQ extended release tablet Take 10 mEq by mouth daily      Semaglutide,0.25 or 0.5MG/DOS, 2 MG/1.5ML SOPN Inject into the skin      glimepiride (AMARYL) 2 MG tablet Take 2 mg by mouth 2 times daily      atorvastatin (LIPITOR) 40 MG tablet Take 40 mg by mouth daily      colchicine (COLCRYS) 0.6 MG tablet Take 0.6 mg by mouth daily      insulin glargine (LANTUS) 100 UNIT/ML injection vial Inject 25 Units into the skin 2 times daily  Qty: 1 vial, Refills: 3      !! insulin lispro (HUMALOG) 100 UNIT/ML injection vial Inject 0-18 Units into the skin 3 times daily (with meals)  Qty: 1 vial, Refills: 3      !! insulin lispro (HUMALOG) 100 UNIT/ML injection vial Inject 0-9 Units into the skin nightly  Qty: 1 vial, Refills: 3      miconazole (MICOTIN) 2 % powder Apply topically 2 times daily.   Qty: 45 g, Refills: 1      zinc sulfate (ZINCATE) 220 (50 Zn) MG capsule Take 1 capsule by mouth daily  Qty: 30 capsule, Refills: 0      Cholecalciferol (VITAMIN D) 25 MCG TABS Take 1 tablet by mouth daily  Qty: 30 tablet, Refills: 0    Comments: Labeling may look different. 25 mcg=1000 Units. Please double check dosages. aspirin 81 MG chewable tablet Take 1 tablet by mouth daily  Qty: 30 tablet, Refills: 3      !! Ascorbic Acid (VITAMIN C) 250 MG tablet Take 250 mg by mouth daily      atenolol (TENORMIN) 100 MG tablet Take 100 mg by mouth Take 1 tab daily       ! ! - Potential duplicate medications found. Please discuss with provider. ALLERGIES     is allergic to lisinopril, codeine, and vicodin [hydrocodone-acetaminophen]. FAMILY HISTORY     She indicated that her mother is . She indicated that her father is . She indicated that both of her sisters are . She indicated that the status of her neg hx is unknown.   family history includes Cancer in her sister and sister; Heart Attack in her father; Heart Disease in her father; Stroke in her mother. SOCIAL HISTORY    reports that she has never smoked. She has never used smokeless tobacco. She reports that she does not drink alcohol and does not use drugs. PHYSICAL EXAM     INITIAL VITALS:  height is 5' 8\" (1.727 m) and weight is 374 lb (169.6 kg) (abnormal). Her oral temperature is 98 °F (36.7 °C). Her blood pressure is 158/100 (abnormal) and her pulse is 84. Her respiration is 20 and oxygen saturation is 94%. Physical Exam  Vitals and nursing note reviewed. Constitutional:       General: She is not in acute distress. Appearance: She is well-developed. She is morbidly obese. She is not toxic-appearing or diaphoretic. HENT:      Head: Normocephalic and atraumatic. Right Ear: Hearing normal.      Left Ear: Hearing normal.      Nose: Nose normal. No rhinorrhea. Mouth/Throat:      Pharynx: Uvula midline. No oropharyngeal exudate. Eyes:      General: Lids are normal. No scleral icterus.      Conjunctiva/sclera: Conjunctivae normal.      Pupils: Pupils are equal, round, and reactive to light. Neck:      Trachea: No tracheal deviation. Cardiovascular:      Rate and Rhythm: Normal rate and regular rhythm. Heart sounds: Normal heart sounds. No murmur heard. Pulmonary:      Effort: Pulmonary effort is normal. No respiratory distress. Breath sounds: Normal breath sounds. No stridor. No decreased breath sounds or wheezing. Abdominal:      General: There is no distension. Palpations: Abdomen is soft. Abdomen is not rigid. Tenderness: There is no abdominal tenderness. Musculoskeletal:         General: Normal range of motion. Cervical back: Normal, normal range of motion and neck supple. No rigidity. Thoracic back: Normal.      Lumbar back: Normal.      Right knee: Tenderness present. Left knee: Tenderness present. Comments: Movement normal as observed   Lymphadenopathy:      Cervical: No cervical adenopathy. Skin:     General: Skin is warm and dry. Coloration: Skin is not pale. Findings: No rash. Neurological:      Mental Status: She is alert and oriented to person, place, and time. Gait: Gait normal.      Comments: No gross deficits observed   Psychiatric:         Mood and Affect: Mood normal.         Speech: Speech normal.         Behavior: Behavior normal.         Thought Content: Thought content normal.       DIFFERENTIAL DIAGNOSIS:   Including but not limited to: COVID, UTI, pneumonia, hyponatremia, RADHA    Diagnoses Considered but I have low suspicion of:   ACS, sepsis    DIAGNOSTIC RESULTS     EKG: All EKG's are interpreted by [facility] who either signs or Co-signs this chart in the absence of a cardiologist.  Ventricular rate 112 bpm  QRS duration 74 ms  QTc interval 458 ms  P-RR-T axes  *, 122, 10  Atrial fibrillation with RVR. PVCs.   No STEMI    RADIOLOGY: non-plain film images(s) such as CT,Ultrasound and MRI are read by the radiologist.  Plain radiographic images are visualized and preliminarily interpreted by the emergency physician unless otherwise stated below. XR CHEST PORTABLE   Final Result   1. No interval change since previous study dated 10 of October 2022. Pinky Angles **This report has been created using voice recognition software. It may contain minor errors which are inherent in voice recognition technology. **      Final report electronically signed by DR Shan Hickman on 1/23/2023 2:49 PM          LABS:   Labs Reviewed   COVID-19 & INFLUENZA COMBO - Abnormal; Notable for the following components:       Result Value    SARS-CoV-2 RNA, RT PCR DETECTED (*)     All other components within normal limits   CBC WITH AUTO DIFFERENTIAL - Abnormal; Notable for the following components:    RBC 5.63 (*)     RDW-CV 15.7 (*)     RDW-SD 46.7 (*)     All other components within normal limits   BASIC METABOLIC PANEL - Abnormal; Notable for the following components:    Sodium 134 (*)     Glucose 195 (*)     All other components within normal limits   BRAIN NATRIURETIC PEPTIDE - Abnormal; Notable for the following components:    Pro-BNP 2195.0 (*)     All other components within normal limits   OSMOLALITY - Abnormal; Notable for the following components:    Osmolality Calc 271.9 (*)     All other components within normal limits   URINE WITH REFLEXED MICRO - Abnormal; Notable for the following components:    Blood, Urine SMALL (*)     Protein, UA 30 (*)     All other components within normal limits   C-REACTIVE PROTEIN - Abnormal; Notable for the following components:    CRP 3.09 (*)     All other components within normal limits   FERRITIN - Abnormal; Notable for the following components:    Ferritin 897 (*)     All other components within normal limits   LACTATE DEHYDROGENASE - Abnormal; Notable for the following components:     (*)     All other components within normal limits   PROTIME-INR - Abnormal; Notable for the following components:    INR 1.26 (*)     All other components within normal limits   TROPONIN   HEPATIC FUNCTION PANEL   ANION GAP   GLOMERULAR FILTRATION RATE, ESTIMATED   APTT   ANTI-XA, UNFRACTIONATED HEPARIN   BASIC METABOLIC PANEL W/ REFLEX TO MG FOR LOW K   CBC WITH AUTO DIFFERENTIAL   ANTI-XA, UNFRACTIONATED HEPARIN   POCT GLUCOSE   POCT GLUCOSE       EMERGENCY DEPARTMENT COURSE:   Vitals:    Vitals:    01/23/23 1843 01/23/23 1919 01/23/23 2004 01/23/23 2035   BP: (!) 142/117 (!) 156/95  (!) 158/100   Pulse: 87 79 85 84   Resp: 21 24 25 20   Temp:   98.4 °F (36.9 °C) 98 °F (36.7 °C)   TempSrc:   Oral Oral   SpO2: 95% 93% 94% 94%   Weight:    (!) 374 lb (169.6 kg)   Height:    5' 8\" (1.727 m)       MDM:  The patient was seen by me in the emergency room for a fall that occurred at home. Physical exam revealed bilateral knee pain. The patient was neurologically intact and nontoxic-appearing. Vital signs reviewed and noted stable. Old records were reviewed. Appropriate laboratory and imaging studies were ordered and reviewed upon completion. Pertinent findings: COVID positive, elevated BNP, blood sugar 195; new onset atrial fibrillation with variable RVR. Interventions: Cardizem, oral nutrition     Reexamination: Patient resting comfortably in bed. No complaints. Vital signs remained stable while in the ER. Discussed potential admission of which she was agreeable. Results were discussed with the patient and family as well as desire for admission and they were amenable. Geronimo Fisher PA-C, of our hospitalists' service was consulted and graciously accepted admission. The patient was admitted to the hospital in fair condition. CRITICAL CARE:   During my workup of this patient, it did become clear to me the critical nature of this patient's illness which did require my constant attention, and a critical care time 30 minutes was given    CONSULTS:  Geronimo Fisher PA-C (hospitalist)    PROCEDURES:  None    FINAL IMPRESSION      1.  Fall, initial encounter    2. General weakness    3. New onset atrial fibrillation ([facility] [address]    4. Diabetes mellitus due to underlying condition with hyperglycemia, with long-term current use of insulin ([facility])          DISPOSITION/PLAN     1. Fall, initial encounter    2. General weakness    3. New onset atrial fibrillation ([address]    4.  Diabetes mellitus due to underlying condition with hyperglycemia, with long-term current use of insulin [facility])    Admission      (Please note that portions of this note were completed with a voice recognition program.  Efforts were made to edit the dictations but occasionally words are mis-transcribed.)    Lola Gonsalez PA-C 01/23/23 9:01 PM    OFELIA Ortiz PA-C  01/23/23 211

## 2023-01-24 LAB
ANION GAP SERPL CALC-SCNC: 11 MEQ/L (ref 8–16)
BASOPHILS ABSOLUTE: 0.1 THOU/MM3 (ref 0–0.1)
BASOPHILS NFR BLD AUTO: 1 %
BUN SERPL-MCNC: 8 MG/DL (ref 7–22)
CALCIUM SERPL-MCNC: 8.6 MG/DL (ref 8.5–10.5)
CHLORIDE SERPL-SCNC: 98 MEQ/L (ref 98–111)
CO2 SERPL-SCNC: 26 MEQ/L (ref 23–33)
CREAT SERPL-MCNC: 0.7 MG/DL (ref 0.4–1.2)
DEPRECATED RDW RBC AUTO: 46.9 FL (ref 35–45)
EKG Q-T INTERVAL: 336 MS
EKG QRS DURATION: 74 MS
EKG QTC CALCULATION (BAZETT): 458 MS
EKG R AXIS: 122 DEGREES
EKG T AXIS: 10 DEGREES
EKG VENTRICULAR RATE: 112 BPM
EOSINOPHIL NFR BLD AUTO: 0.1 %
EOSINOPHILS ABSOLUTE: 0 THOU/MM3 (ref 0–0.4)
ERYTHROCYTE [DISTWIDTH] IN BLOOD BY AUTOMATED COUNT: 15.8 % (ref 11.5–14.5)
GFR SERPL CREATININE-BSD FRML MDRD: > 60 ML/MIN/1.73M2
GLUCOSE BLD STRIP.AUTO-MCNC: 151 MG/DL (ref 70–108)
GLUCOSE BLD STRIP.AUTO-MCNC: 166 MG/DL (ref 70–108)
GLUCOSE BLD STRIP.AUTO-MCNC: 171 MG/DL (ref 70–108)
GLUCOSE BLD STRIP.AUTO-MCNC: 174 MG/DL (ref 70–108)
GLUCOSE SERPL-MCNC: 166 MG/DL (ref 70–108)
HCT VFR BLD AUTO: 45.8 % (ref 37–47)
HEPARIN UNFRACTIONATED: 0.11 U/ML (ref 0.3–0.7)
HEPARIN UNFRACTIONATED: 0.24 U/ML (ref 0.3–0.7)
HEPARIN UNFRACTIONATED: 0.46 U/ML (ref 0.3–0.7)
HEPARIN UNFRACTIONATED: 0.51 U/ML (ref 0.3–0.7)
HGB BLD-MCNC: 14.7 GM/DL (ref 12–16)
IMM GRANULOCYTES # BLD AUTO: 0.04 THOU/MM3 (ref 0–0.07)
IMM GRANULOCYTES NFR BLD AUTO: 0.4 %
LV EF: 58 %
LVEF MODALITY: NORMAL
LYMPHOCYTES ABSOLUTE: 1.5 THOU/MM3 (ref 1–4.8)
LYMPHOCYTES NFR BLD AUTO: 16.3 %
MAGNESIUM SERPL-MCNC: 1.9 MG/DL (ref 1.6–2.4)
MCH RBC QN AUTO: 26.7 PG (ref 26–33)
MCHC RBC AUTO-ENTMCNC: 32.1 GM/DL (ref 32.2–35.5)
MCV RBC AUTO: 83.1 FL (ref 81–99)
MONOCYTES ABSOLUTE: 1.3 THOU/MM3 (ref 0.4–1.3)
MONOCYTES NFR BLD AUTO: 14.9 %
NEUTROPHILS NFR BLD AUTO: 67.3 %
NRBC BLD AUTO-RTO: 0 /100 WBC
OSMOLALITY SERPL CALC.SUM OF ELEC: 272.2 MOSMOL/KG (ref 275–300)
PHOSPHATE SERPL-MCNC: 3 MG/DL (ref 2.4–4.7)
PLATELET # BLD AUTO: 239 THOU/MM3 (ref 130–400)
PMV BLD AUTO: 10.6 FL (ref 9.4–12.4)
POTASSIUM SERPL-SCNC: 4.5 MEQ/L (ref 3.5–5.2)
RBC # BLD AUTO: 5.51 MILL/MM3 (ref 4.2–5.4)
SEGMENTED NEUTROPHILS ABSOLUTE COUNT: 6 THOU/MM3 (ref 1.8–7.7)
SODIUM SERPL-SCNC: 135 MEQ/L (ref 135–145)
TSH SERPL DL<=0.005 MIU/L-ACNC: 1.04 UIU/ML (ref 0.4–4.2)
WBC # BLD AUTO: 8.9 THOU/MM3 (ref 4.8–10.8)

## 2023-01-24 PROCEDURE — 83735 ASSAY OF MAGNESIUM: CPT

## 2023-01-24 PROCEDURE — 85520 HEPARIN ASSAY: CPT

## 2023-01-24 PROCEDURE — 97166 OT EVAL MOD COMPLEX 45 MIN: CPT

## 2023-01-24 PROCEDURE — 85025 COMPLETE CBC W/AUTO DIFF WBC: CPT

## 2023-01-24 PROCEDURE — 6370000000 HC RX 637 (ALT 250 FOR IP): Performed by: PHYSICIAN ASSISTANT

## 2023-01-24 PROCEDURE — 99223 1ST HOSP IP/OBS HIGH 75: CPT | Performed by: INTERNAL MEDICINE

## 2023-01-24 PROCEDURE — 93010 ELECTROCARDIOGRAM REPORT: CPT | Performed by: INTERNAL MEDICINE

## 2023-01-24 PROCEDURE — 6360000002 HC RX W HCPCS: Performed by: STUDENT IN AN ORGANIZED HEALTH CARE EDUCATION/TRAINING PROGRAM

## 2023-01-24 PROCEDURE — 84443 ASSAY THYROID STIM HORMONE: CPT

## 2023-01-24 PROCEDURE — 97530 THERAPEUTIC ACTIVITIES: CPT

## 2023-01-24 PROCEDURE — 84100 ASSAY OF PHOSPHORUS: CPT

## 2023-01-24 PROCEDURE — 93005 ELECTROCARDIOGRAM TRACING: CPT | Performed by: STUDENT IN AN ORGANIZED HEALTH CARE EDUCATION/TRAINING PROGRAM

## 2023-01-24 PROCEDURE — 97162 PT EVAL MOD COMPLEX 30 MIN: CPT

## 2023-01-24 PROCEDURE — 82948 REAGENT STRIP/BLOOD GLUCOSE: CPT

## 2023-01-24 PROCEDURE — 2580000003 HC RX 258: Performed by: PHYSICIAN ASSISTANT

## 2023-01-24 PROCEDURE — 93306 TTE W/DOPPLER COMPLETE: CPT

## 2023-01-24 PROCEDURE — 6360000002 HC RX W HCPCS: Performed by: PHYSICIAN ASSISTANT

## 2023-01-24 PROCEDURE — 36415 COLL VENOUS BLD VENIPUNCTURE: CPT

## 2023-01-24 PROCEDURE — 1200000003 HC TELEMETRY R&B

## 2023-01-24 PROCEDURE — 94640 AIRWAY INHALATION TREATMENT: CPT

## 2023-01-24 PROCEDURE — 80048 BASIC METABOLIC PNL TOTAL CA: CPT

## 2023-01-24 PROCEDURE — 6370000000 HC RX 637 (ALT 250 FOR IP): Performed by: STUDENT IN AN ORGANIZED HEALTH CARE EDUCATION/TRAINING PROGRAM

## 2023-01-24 RX ORDER — LEVALBUTEROL INHALATION SOLUTION 1.25 MG/3ML
1.25 SOLUTION RESPIRATORY (INHALATION) EVERY 8 HOURS PRN
Status: DISCONTINUED | OUTPATIENT
Start: 2023-01-24 | End: 2023-01-26 | Stop reason: HOSPADM

## 2023-01-24 RX ORDER — FUROSEMIDE 40 MG/1
40 TABLET ORAL 2 TIMES DAILY
Status: DISCONTINUED | OUTPATIENT
Start: 2023-01-24 | End: 2023-01-25

## 2023-01-24 RX ADMIN — HEPARIN SODIUM 2000 UNITS: 1000 INJECTION, SOLUTION INTRAVENOUS; SUBCUTANEOUS at 13:48

## 2023-01-24 RX ADMIN — POTASSIUM CHLORIDE 10 MEQ: 750 TABLET, EXTENDED RELEASE ORAL at 09:43

## 2023-01-24 RX ADMIN — LOSARTAN POTASSIUM 50 MG: 50 TABLET, FILM COATED ORAL at 09:42

## 2023-01-24 RX ADMIN — LEVALBUTEROL HYDROCHLORIDE 1.25 MG: 1.25 SOLUTION RESPIRATORY (INHALATION) at 15:19

## 2023-01-24 RX ADMIN — HEPARIN SODIUM 2000 UNITS: 1000 INJECTION, SOLUTION INTRAVENOUS; SUBCUTANEOUS at 07:30

## 2023-01-24 RX ADMIN — HEPARIN SODIUM 10 UNITS/KG/HR: 10000 INJECTION, SOLUTION INTRAVENOUS at 18:35

## 2023-01-24 RX ADMIN — ASPIRIN 81 MG: 81 TABLET, CHEWABLE ORAL at 09:42

## 2023-01-24 RX ADMIN — FUROSEMIDE 40 MG: 40 TABLET ORAL at 18:35

## 2023-01-24 RX ADMIN — ATORVASTATIN CALCIUM 40 MG: 40 TABLET, FILM COATED ORAL at 20:30

## 2023-01-24 RX ADMIN — ATENOLOL 100 MG: 100 TABLET ORAL at 09:43

## 2023-01-24 RX ADMIN — INSULIN GLARGINE 20 UNITS: 100 INJECTION, SOLUTION SUBCUTANEOUS at 21:50

## 2023-01-24 RX ADMIN — SODIUM CHLORIDE, PRESERVATIVE FREE 10 ML: 5 INJECTION INTRAVENOUS at 20:30

## 2023-01-24 RX ADMIN — METOPROLOL TARTRATE 25 MG: 25 TABLET, FILM COATED ORAL at 13:48

## 2023-01-24 RX ADMIN — COLCHICINE 0.6 MG: 0.6 TABLET, FILM COATED ORAL at 09:43

## 2023-01-24 RX ADMIN — SPIRONOLACTONE 25 MG: 25 TABLET ORAL at 09:43

## 2023-01-24 RX ADMIN — PANTOPRAZOLE SODIUM 40 MG: 40 TABLET, DELAYED RELEASE ORAL at 05:58

## 2023-01-24 RX ADMIN — ALLOPURINOL 300 MG: 300 TABLET ORAL at 09:42

## 2023-01-24 RX ADMIN — METOPROLOL TARTRATE 25 MG: 25 TABLET, FILM COATED ORAL at 20:30

## 2023-01-24 NOTE — CONSULTS
[facility]'s  Consult    Patient's Name/Date of Birth: Nae Davison / 1955 (26 y.o.)    Date: January 24, 2023     Referring Provider: DIMITRI Lerner    CHIEF COMPLAINT: Fall and SOB      HPI: This is a pleasant 69 y.o. female nonsmoker with a PMH CAD, PAD s/p R SFA angioplasty, mild-mod AS, HTN, HLD, DMII, GERD, asthma and history of gastric metaplasia presents for shortness of breath and fall. Per report that patient missed sitting on the commode, and subsequently fell. The patient reports she has had increased shortness of breath over the past couple of days. This was associated with orthopnea, PND but no BLE edema. She denied any CP, SOB or palpitations. Upon evaluation in the ED the patient was found in new onset Afib with RVR, with initial EKG showing Afib in RVR with PVCs,  and Qtc 458. She denies a prior history of Afib, with a loop recorder previously placed that demonstrated no afib. The patient did test positive for COVID-19. She was started om cardiem gtt, but this was discontinued due to HR dropping into 40s. Of note, the patient does have a recent history of GIB. A colonoscopy in 2022 revealed diverticular disease. She currently denies any active dark or red stools. Cardiac history is significant for last Echo EF 60% with no regional wall motion abnormalities, mild-moderate AS with MJ 1.4 sq cm, max AVG 16 mmHg, mean gradient 9 mmHg, and peak velocity 2 m/s 12/2021. Prior cath demonstrated 80-90% stenosis of R SFA s/p angioplasty, 30-40% L SFA, 50% L popliteal and 40% R popliteal stenosis 11/2019. Negative stress test 6/2017. Echo: No results found for this or any previous visit.        All labs, EKG's, diagnostic testing and images as well as cardiac cath, stress testing were reviewed during this encounter    Past Medical History:   Diagnosis Date    Arthritis     Asthma     CAD (coronary artery disease)     Degenerative lumbar spinal stenosis 01/20/2015    Diabetes mellitus ([facility] [address]     GERD (gastroesophageal reflux disease)     Gout     History of MI (myocardial infarction) 2004    History of stroke     Hyperlipidemia     Hypertension     Morbid obesity with body mass index of 50.0-59.9 in adult [facility])     Spondylolisthesis of lumbar region 01/20/2015    Bilateral L5 pars defects.     Unspecified cerebral artery occlusion with cerebral infarction 2010     Past Surgical History:   Procedure Laterality Date    BACK SURGERY      COLONOSCOPY      COLONOSCOPY N/A 10/5/2022    COLONOSCOPY performed by Wyatt Patel MD at [facility]    ENDOSCOPY, COLON, DIAGNOSTIC      JOINT REPLACEMENT Bilateral     knees    KNEE SURGERY  2006    x2    AR ECHO TRANSESOPHAG R-T 2D IMG ACQUISJ I&R ONLY Left 6/8/2018    TRANSESOPHAGEAL ECHOCARDIOGRAM performed by Micah Adams MD at [address] [place] [address] Right 06/2019    x3    VASCULAR SURGERY      left carotid     Current Facility-Administered Medications   Medication Dose Route Frequency Provider Last Rate Last Admin    sodium chloride flush 0.9 % injection 5-40 mL  5-40 mL IntraVENous 2 times per day Allison Cullen PA-C   10 mL at 01/23/23 6787    sodium chloride flush 0.9 % injection 5-40 mL  5-40 mL IntraVENous PRN Allison Cullen PA-C        0.9 % sodium chloride infusion   IntraVENous PRN Andrew Nugent PA-C        ondansetron (ZOFRAN-ODT) disintegrating tablet 4 mg  4 mg Oral Q8H PRN Andrew Nugent PA-C        Or    ondansetron (ZOFRAN) injection 4 mg  4 mg IntraVENous Q6H PRN Andrew Nugent PA-C        polyethylene glycol (GLYCOLAX) packet 17 g  17 g Oral Daily PRN Andrew Nugent PA-C        acetaminophen (TYLENOL) tablet 650 mg  650 mg Oral Q6H PRN DIMITRI Pittman-PABLITO        Or    acetaminophen (TYLENOL) suppository 650 mg  650 mg Rectal Q6H PRN Andrew Nugent PA-C        allopurinol (ZYLOPRIM) tablet 300 mg  300 mg Oral Daily Ariel Doe Sachin Redding PA-C        [Held by provider] amLODIPine (NORVASC) tablet 10 mg  10 mg Oral Daily Yadi Hotter, PA-C        aspirin chewable tablet 81 mg  81 mg Oral Daily Andrew Ohlemacher, PA-C   81 mg at 01/23/23 2352    atenolol (TENORMIN) tablet 100 mg  100 mg Oral Daily Yadi Hotter, PA-C   100 mg at 01/23/23 2353    atorvastatin (LIPITOR) tablet 40 mg  40 mg Oral Nightly Yadi Hotter, PA-C   40 mg at 01/23/23 2352    colchicine (COLCRYS) tablet 0.6 mg  0.6 mg Oral Daily Yadi Hotter, PA-C   0.6 mg at 01/23/23 2353    losartan (COZAAR) tablet 50 mg  50 mg Oral Daily Yadi Hotter, PA-C   50 mg at 01/23/23 2353    pantoprazole (PROTONIX) tablet 40 mg  40 mg Oral QAM AC Andrew Ohlemacher, PA-C   40 mg at 01/24/23 0558    potassium chloride (KLOR-CON) extended release tablet 10 mEq  10 mEq Oral Daily Yadi Hotter, PA-C   10 mEq at 01/23/23 2352    spironolactone (ALDACTONE) tablet 25 mg  25 mg Oral Daily Yadi Hotter, PA-C   25 mg at 01/23/23 2357    insulin glargine (LANTUS) injection vial 20 Units  20 Units SubCUTAneous Nightly Andrew Ohlemacher, PA-C        glucose chewable tablet 16 g  4 tablet Oral PRN Andrew Ohlemacher, PA-C        dextrose bolus 10% 125 mL  125 mL IntraVENous PRN Andrew Ohlemacher, PA-C        Or    dextrose bolus 10% 250 mL  250 mL IntraVENous PRN Andrew Ohlemacher, PA-C        glucagon (rDNA) injection 1 mg  1 mg SubCUTAneous PRN Andrew Ohlemacher, PA-C        dextrose 10 % infusion   IntraVENous Continuous PRN Andrew Ohlemacher, PA-C        insulin lispro (HUMALOG) injection vial 0-4 Units  0-4 Units SubCUTAneous TID WC Andrew Ohlemacher, PA-C        insulin lispro (HUMALOG) injection vial 0-4 Units  0-4 Units SubCUTAneous Nightly Andrew Ohlemacher, PA-C        heparin (porcine) injection 4,000 Units  4,000 Units IntraVENous PRN Andrew Nugent PA-C        heparin (porcine) injection 2,000 Units  2,000 Units IntraVENous PRN Jaime Hurtado OFELIA Nugent   2,000 Units at 01/24/23 0730    heparin 25,000 units in dextrose 5% 250 mL (premix) infusion  5-30 Units/kg/hr IntraVENous Continuous Andrew OFELIA Nugent 12.7 mL/hr at 01/24/23 0729 8 Units/kg/hr at 01/24/23 0729     Prior to Admission medications    Medication Sig Start Date End Date Taking? Authorizing Provider   allopurinol (ZYLOPRIM) 300 MG tablet Take 300 mg by mouth daily   Yes Historical Provider, MD   amLODIPine (NORVASC) 5 MG tablet Take 10 mg by mouth daily    Yes Historical Provider, MD   naproxen (NAPROSYN) 375 MG tablet Take 1 tablet by mouth 2 times daily (with meals) 10/10/22   Justino Lynne PA-C   cyclobenzaprine (FLEXERIL) 5 MG tablet TAKE 1 TABLET BY MOUTH UP TO THREE TIMES DAILY AS NEEDED FOR MUSCLE SPASMS 9/13/22   Historical Provider, MD   pantoprazole (PROTONIX) 40 MG tablet Take 1 tablet by mouth every morning (before breakfast) 8/24/22   Alan Flow, APRN - CNP   polyethylene glycol (MIRALAX) 17 GM/SCOOP powder 2 Day colonoscopy prep. Mix one-half bottle with 64 oz clear liquids on Day 1 and drink per instructions. Mix remaining one-half of bottle with 64 oz clear liquids on Day 2 and drink per instructions. 8/24/22   Alan Flow, APRN - CNP   Sennosides (SENNA) 8.6 MG CAPS Take 15 tablets by mouth per instructions on Day 1. Take 15 tablets by mouth per instructions on Day 2. 8/24/22   Alan Flow, APRN - CNP   Omega-3 Fatty Acids (FISH OIL) 1000 MG CAPS Take 3,000 mg by mouth 3 times daily  Patient not taking: No sig reported    Historical Provider, MD   vitamin C (ASCORBIC ACID) 500 MG tablet Take 500 mg by mouth daily    Historical Provider, MD   VITAMIN A PO Take by mouth    Historical Provider, MD   cetirizine (ZYRTEC) 10 MG tablet Take 10 mg by mouth daily  Patient not taking: Reported on 9/19/2022    Historical Provider, MD   capsicum (ZOSTRIX) 0.075 % topical cream Apply topically 3 times daily Apply topically 3 times daily.   Patient not taking: No sig reported    Historical Provider, MD   spironolactone (ALDACTONE) 25 MG tablet Take 25 mg by mouth daily    Historical Provider, MD   losartan (COZAAR) 50 MG tablet Take 50 mg by mouth daily    Historical Provider, MD   potassium chloride (KLOR-CON) 10 MEQ extended release tablet Take 10 mEq by mouth daily    Historical Provider, MD   Semaglutide,0.25 or 0.5MG/DOS, 2 MG/1.5ML SOPN Inject into the skin    Historical Provider, MD   glimepiride (AMARYL) 2 MG tablet Take 2 mg by mouth 2 times daily    Historical Provider, MD   atorvastatin (LIPITOR) 40 MG tablet Take 40 mg by mouth daily  Patient not taking: Reported on 9/19/2022    Historical Provider, MD   colchicine (COLCRYS) 0.6 MG tablet Take 0.6 mg by mouth daily    Historical Provider, MD   insulin glargine (LANTUS) 100 UNIT/ML injection vial Inject 25 Units into the skin 2 times daily 12/18/20   [facility], DO   insulin lispro (HUMALOG) 100 UNIT/ML injection vial Inject 0-18 Units into the skin 3 times daily (with meals) 12/18/20   [facility], DO   insulin lispro (HUMALOG) 100 UNIT/ML injection vial Inject 0-9 Units into the skin nightly 12/18/20   [facility], DO   miconazole (MICOTIN) 2 % powder Apply topically 2 times daily.  12/18/20   [facility], DO   zinc sulfate (ZINCATE) 220 (50 Zn) MG capsule Take 1 capsule by mouth daily 12/18/20   [facility], DO   Cholecalciferol (VITAMIN D) 25 MCG TABS Take 1 tablet by mouth daily 12/18/20   [facility], DO   aspirin 81 MG chewable tablet Take 1 tablet by mouth daily  Patient not taking: No sig reported 6/1/17   PATRICIO Marcum - CNP   Ascorbic Acid (VITAMIN C) 250 MG tablet Take 250 mg by mouth daily    Historical Provider, MD   atenolol (TENORMIN) 100 MG tablet Take 100 mg by mouth Take 1 tab daily    Historical Provider, MD   Scheduled Meds:   sodium chloride flush  5-40 mL IntraVENous 2 times per day    allopurinol  300 mg Oral Daily    [Held by provider] amLODIPine  10 mg Oral Daily    aspirin  81 mg Oral Daily    atenolol  100 mg Oral Daily    atorvastatin  40 mg Oral Nightly    colchicine  0.6 mg Oral Daily    losartan  50 mg Oral Daily    pantoprazole  40 mg Oral QAM AC    potassium chloride  10 mEq Oral Daily    spironolactone  25 mg Oral Daily    insulin glargine  20 Units SubCUTAneous Nightly    insulin lispro  0-4 Units SubCUTAneous TID WC    insulin lispro  0-4 Units SubCUTAneous Nightly     Continuous Infusions:   sodium chloride      dextrose      heparin (PORCINE) Infusion 8 Units/kg/hr (01/24/23 3815)     PRN Meds:.sodium chloride flush, sodium chloride, ondansetron **OR** ondansetron, polyethylene glycol, acetaminophen **OR** acetaminophen, glucose, dextrose bolus **OR** dextrose bolus, glucagon (rDNA), dextrose, heparin (porcine), heparin (porcine)    Allergies   Allergen Reactions    Lisinopril      Other reaction(s): cough    Codeine Nausea And Vomiting     Patient states it can make her vomit.       Vicodin [Hydrocodone-Acetaminophen] Nausea And Vomiting     Family History   Problem Relation Age of Onset    Stroke Mother     Heart Disease Father     Heart Attack Father     Cancer Sister     Cancer Sister     Diabetes Neg Hx      Social History     Socioeconomic History    Marital status:      Spouse name: Can Tiwari    Number of children: 4    Years of education: Not on file    Highest education level: Not on file   Occupational History    Occupation: disability   Tobacco Use    Smoking status: Never    Smokeless tobacco: Never   Vaping Use    Vaping Use: Never used   Substance and Sexual Activity    Alcohol use: Never    Drug use: No    Sexual activity: Yes     Partners: Male   Other Topics Concern    Not on file   Social History Narrative    Not on file     Social Determinants of Health     Financial Resource Strain: Not on file   Food Insecurity: Not on file   Transportation Needs: Not on file   Physical Activity: Not on file   Stress: Not on file   Social Connections: Not on file   Intimate Partner Violence: Not on file   Housing Stability: Not on file     ROS:   Constitutional: Denies any recent wt change. Reports weakness, fatigue  Eyes:  Denies any blurring or double vision, no glaucoma  Ears/Nose/Mouth/Throat:  Denies any chronic sinus/rhinitis, bleeding gums  Cardiovascular:  As described above. Respiratory:  Denies any frequent cough, wheezing or coughing up blood. Reports shortness of breath, orthopnea, PND  Genitourinary:  Denies difficulty with urination and kidney stones  Gastrointestinal:  Denies any chronic problems with abdominal pain, nausea, vomiting or diarrhea  Musculoskeletal:  Denies any joint pain, back pain, or difficulty walking. Reports body aches  Integumentary:  Denies any rash  Neurological:  No numbness or tingling  Endocrine:  Denies any polydipsia. Hematologic/Lymphatic:  Denies any hemorrhage or lymphatic drainage problems. Labs:  CBC:   Recent Labs     01/23/23  1447 01/24/23  0505   WBC 8.6 8.9   HGB 15.2 14.7   HCT 47.0 45.8   MCV 83.5 83.1    239     BMP:   Recent Labs     01/23/23  1447 01/24/23  0505   * 135   K 4.4 4.5   CL 98 98   CO2 24 26   BUN 8 8   CREATININE 0.8 0.7     Accucheck Glucoses:   Recent Labs     01/24/23  0556   POCGLU 151*     Cardiac Enzymes: No results for input(s): CKTOTAL, CKMB, CKMBINDEX, TROPONINI in the last 72 hours.   PT/INR:   Recent Labs     01/23/23  1823   INR 1.26*     APTT:   Recent Labs     01/23/23  1823   APTT 31.4     Liver Profile:  Lab Results   Component Value Date/Time    AST 16 01/23/2023 02:47 PM    ALT 13 01/23/2023 02:47 PM    BILIDIR <0.2 01/23/2023 02:47 PM    BILITOT 0.7 01/23/2023 02:47 PM    ALKPHOS 63 01/23/2023 02:47 PM     Lab Results   Component Value Date/Time    CHOL 184 09/13/2022 02:31 PM    CHOL 158 06/07/2018 08:49 AM    HDL 47 09/13/2022 02:31 PM    TRIG 90 09/13/2022 02:31 PM     TSH:   Lab Results   Component Value Date/Time    TSH 1.92 09/13/2022 02:31 PM     UA:   Lab Results   Component Value Date/Time    COLORU YELLOW 01/23/2023 03:45 PM    PHUR 8.0 01/23/2023 03:45 PM    WBCUA 0-2 01/23/2023 03:45 PM    RBCUA 0-2 01/23/2023 03:45 PM    MUCUS NONE SEEN 01/23/2023 03:45 PM    YEAST NONE SEEN 01/23/2023 03:45 PM    BACTERIA FEW 01/23/2023 03:45 PM    LEUKOCYTESUR NEGATIVE 01/23/2023 03:45 PM    UROBILINOGEN 0.2 01/23/2023 03:45 PM    BILIRUBINUR NEGATIVE 01/23/2023 03:45 PM    BLOODU SMALL 01/23/2023 03:45 PM    GLUCOSEU NEGATIVE 01/23/2023 03:45 PM         Physical Exam:  Vitals:    01/24/23 0517   BP: (!) 152/97   Pulse: 71   Resp: 18   Temp: 98.6 °F (37 °C)   SpO2: 97%      Intake/Output Summary (Last 24 hours) at 1/24/2023 0739  Last data filed at 1/24/2023 9087  Gross per 24 hour   Intake 120 ml   Output 650 ml   Net -530 ml      General:  moderate acute distress, interactive and cooperative, obese  Neck: Supple, no JVD, no carotid bruits  Heart: Regular rhythm normal S1 and S2, no rubs, murmurs or gallops  Lungs: diminished breath sounds bilaterally, minimal diffuse wheezes, no rhonchi  Abdomen: positive bowel sounds, soft, non-tender, non-distended, no bruits, no masses  Extremities:no clubbing, cyanosis or edema  Neurologic: alert and oriented x 3, cranial nerves 2-12 grossly intact, motor and sensory intact, moving all extremities  Skin: No rashes  Psych: AO x 3, no depression/brittany, no pressured speech, normal affect  Lymph: No obvious LAD      Assessment & Plan:  New onset Atrial Fibrillation in RVR: [id number] 5. Initial EKG Efib in RVR with PVCs , Qtc 458. No ST or T waves abnormalities. Trop negative. No CP/SOB/palpitations. No prior history of afib, previous loop recorder negative. Likely secondary to COVID-19 on underlying VICKI/OHS. Low suspicion for bacterial infection, UA negative. Hgb stable, no obvious GIB. Electrolytes WNL, check mag and phos. Check TSH/FT4. BNP 2195, however possibly appropriate in setting of BMI.  No CXR, defer for now. Echo pending. Not tolerating Cardizem  gtt. On heparin gtt. Telemetry reviewed. Repeat EKG. High risk for respiratory failure with ALICIA cardioversion. Will pursue rate control. Started metoprolol 25 mg p.o twice daily. Consider outpatient sleep study. CAD: per chart review. No LHC. On ASA, statin, BB.   PAD: s/p R SFA angioplasty 11/2019. Aortic Stenosis, Mild-moderate: with MJ 1.4 sq cm, max AVG 16 mmHg, mean gradient 9 mmHg, and peak velocity 2 m/s 12/2021  HTN: continue antihypertensives, currently on Cardizem gtt  HLD: continue statin  CVA: noted  DMII: noted        Thank you for allowing us to participate in the care of this patient. Please do not hesitate to call us with questions. Electronically signed by Tesha Harris DO on 1/24/2023 at 7:39 AM    Attending Supervising Physician's [facility] [address] Statement  I performed a history and physical examination on the patient and discussed the management with the resident physician. I reviewed and agree with the findings and plan as documented in the resident's note except for as noted below. 69 y.o. female nonsmoker with a PMH CAD, PAD s/p R SFA angioplasty, mild-mod AS, HTN, HLD, DMII, GERD, asthma and history of gastric metaplasia presents for shortness of breath and fall--she missed the toilet seat. Afib RVR and mild-moderate AS. +COVID. Very sob in the room, cannot speak without getting dyspneic. BMI 57. She is now rate controlled with AFib, would continue this strategy. She is off AVN blockers due to HR dropping into 40s, but was asymptomatic. HR 77. Could consider ALICIA-DCCV but high risk for decompensation. Would prefer [address] when able, till then Heparin. Cotninue ASA/statin fo PAD. TTE pending. May need some diuresis, consider adding Lasix 40 mg PO BID. VICKI work-u and bariatrics as outpatient. Further recommendations based on results and clinical course.     Electronically signed by Fidel Glaser MD on 1/24/23 at 3:14 PM EST  Interventional Cardiology - [facility]

## 2023-01-24 NOTE — PROGRESS NOTES
[facility]  INPATIENT OCCUPATIONAL THERAPY  STRZ RENAL TELEMETRY 6K  EVALUATION    Time:    Time In: 1030  Time Out: 1102  Timed Code Treatment Minutes: 23 Minutes  Minutes: 32          Date: 2023  Patient Name: Jayla Culver,   Gender: female      MRN: [id number]  : 1955  (69 y.o.)  Referring Practitioner: Leidy Corrales PA-C  Diagnosis: General Weakness  Additional Pertinent Hx: This is a pleasant 69 y.o. female nonsmoker with a PMH CAD, PAD s/p R SFA angioplasty, mild-mod AS, HTN, HLD, DMII, GERD, asthma and history of gastric metaplasia presents for shortness of breath and fall. Per report that patient missed sitting on the commode, and subsequently fell. The patient reports she has had increased shortness of breath over the past couple of days. This was associated with orthopnea, PND but no BLE edema. She denied any CP, SOB or palpitations. Upon evaluation in the ED the patient was found in new onset Afib with RVR, with initial EKG showing Afib in RVR with PVCs,  and Qtc 458. She denies a prior history of Afib, with a loop recorder previously placed that demonstrated no afib. The patient did test positive for COVID-19. She was started om cardiem gtt, but this was discontinued due to HR dropping into 40s. Of note, the patient does have a recent history of GIB. A colonoscopy in  revealed diverticular disease. She currently denies any active dark or red stools. Cardiac history is significant for last Echo EF 60% with no regional wall motion abnormalities, mild-moderate AS with MJ 1.4 sq cm, max AVG 16 mmHg, mean gradient 9 mmHg, and peak velocity 2 m/s 2021. Prior cath demonstrated 80-90% stenosis of R SFA s/p angioplasty, 30-40% L SFA, 50% L popliteal and 40% R popliteal stenosis 2019. Negative stress test 2017.     Restrictions/Precautions:  Restrictions/Precautions: General Precautions, Fall Risk, Isolation  Position Activity Restriction  Other position/activity restrictions: COVID, monitor HR    Subjective  Chart Reviewed: Yes, Orders, Progress Notes  Patient assessed for rehabilitation services?: Yes    Subjective: RN okayed session. Pt was resting in bed, agreeabl to OT with max encouragement and education. Pain:  Grimmacing and c/o pain throughout, however, no specific information regarding pain provided. Pt just reporting \"I'm Sore\"    Vitals: Oxygen: 93% on RA, moderate SOB    Social/Functional History:  Lives With: Spouse, Son  Type of Home: House  Home Layout: One level  Home Access: Ramped entrance  Home Equipment: Walker, rolling   Bathroom Shower/Tub: Walk-in shower  Bathroom Toilet: Standard  Bathroom Equipment: Commode  Bathroom Accessibility: Accessible  IADL Comments: Pt reporting indep with ADLs and IADLs; however, reports her  assists her with mobility. Anticipate pt needs increased A with ADLs and IADLs. Receives Help From: Family  ADL Assistance: [address] [place] [address] Avenue: Independent  Homemaking Responsibilities: Yes  Ambulation Assistance: Independent  Transfer Assistance: Needs assistance    Active : Yes     Additional Comments: Pt states she \"can't use\" her RW. She has assist from her  for mobility, but has noticed increased weakness. She states her  is home and able to assist her. VISION:WFL    HEARING:  WFL    COGNITION: Slow Processing, Decreased Recall, Decreased Insight, Impaired Memory, Decreased Problem Solving, Decreased Safety Awareness, and Decreased Arousal    RANGE OF MOTION:  Bilateral Upper Extremity:  Impaired - grossly 110    STRENGTH:  Bilateral Upper Extremity:  Impaired - grossly 3+/5    SENSATION:   WFL    ADL:   No ADL's completed this session. Pt declined . BALANCE:  Sitting Balance:  Minimal Assistance, Moderate Assistance. With posterior lean. Pt tolerated sitting on EOB x4 min prior to c/o max fatigue and discomfort with pain.  Pt self initiated sit to supine with HOB flat. Pt consistently grabbing rails to pull self up on. BED MOBILITY:  Supine to Sit: Maximum Assistance with increased time and HOB elevated. Sit to Supine: Moderate Assistance to guide BLE onto bed. Scooting: Maximum Assistance to scoot hips towards EOB while seated upright. Activity Tolerance:  Patient tolerance of  treatment: poor. Assessment:  Assessment: Pt presented with the listed deficits s/p admission with general weakness. Pt with decreased strength, endurance, and activity tolerance and currently requires increased A for ADLs and IADLs. Pt would benefit from continued OT to restore PLOF maximize pt's indep with ADLs and IADLs in prep for a safe return home at max level of indep. Performance deficits / Impairments: Decreased functional mobility , Decreased endurance, Decreased posture, Decreased ADL status, Decreased balance, Decreased strength, Decreased safe awareness, Decreased high-level IADLs  Prognosis: Good  REQUIRES OT FOLLOW-UP: Yes  Decision Making: Medium Complexity    Treatment Initiated: Treatment and education initiated within context of evaluation. Evaluation time included review of current medical information, gathering information related to past medical, social and functional history, completion of standardized testing, formal and informal observation of tasks, assessment of data and development of plan of care and goals. Treatment time included skilled education and facilitation of tasks to increase safety and independence with ADL's for improved functional independence and quality of life.     Discharge Recommendations:  [address], Patient would benefit from continued therapy after discharge    Patient Education:     Patient Education  Education Given To: Patient  Education Provided: Role of Therapy, Plan of Care  Education Method: Demonstration, Verbal  Barriers to Learning: None  Education Outcome: Continued education needed    Equipment Recommendations:  Equipment Needed: Yes  Other: defer to next level of care, monitor progress    Plan:  Times Per Week: 3-5x  Current Treatment Recommendations: Strengthening, Balance training, Endurance training, Self-Care / ADL, Safety education & training. See long-term goal time frame for expected duration of plan of care. If no long-term goals established, a short length of stay is anticipated. Goals:  Patient goals : return home  Short Term Goals  Time Frame for Short Term Goals: by discharge  Short Term Goal 1: Pt will tolerate sitting EOB x8 min with consistent Min A and min VC for upright posture to increase indep with ADLs  Short Term Goal 2: Pt will complete EOB ADLs with Min A and min VC for adaptation to increase indep with dressing  Short Term Goal 3: OTR to assess t/fs and mobility as appropriate         Following session, patient left in safe position with all fall risk precautions in place.

## 2023-01-24 NOTE — ED NOTES
Pt transferred to [facility] room 008. Nurse informed prior to taking pt up in a stable condition.       Yomi Vergara  01/23/23 2001

## 2023-01-24 NOTE — PLAN OF CARE
Problem: Discharge Planning  Goal: Discharge to home or other facility with appropriate resources  Outcome: Progressing     Problem: Skin/Tissue Integrity  Goal: Absence of new skin breakdown  Description: 1. Monitor for areas of redness and/or skin breakdown  2. Assess vascular access sites hourly  3. Every 4-6 hours minimum:  Change oxygen saturation probe site  4. Every 4-6 hours:  If on nasal continuous positive airway pressure, respiratory therapy assess nares and determine need for appliance change or resting period. Outcome: Progressing     Problem: Safety - Adult  Goal: Free from fall injury  Outcome: Progressing  Flowsheets (Taken 1/24/2023 0050)  Free From Fall Injury:   Instruct family/caregiver on patient safety   Based on caregiver fall risk screen, instruct family/caregiver to ask for assistance with transferring infant if caregiver noted to have fall risk factors   Care plan reviewed with patient. Patient verbalizes understanding of the plan of care and contribute to goal setting.

## 2023-01-24 NOTE — PROGRESS NOTES
Physician Progress Note      PATIENT:               Desiree Mckinney  CSN #:                  [id number]  :                       1955  ADMIT DATE:       2023 2:07 PM  [address] [place] DATE:  Donell Morrell  PROVIDER #:        Huy MOSLEY          QUERY TEXT:    Patient admitted with BMI 56.87 . If possible, please make selection below,   document in progress notes and discharge summary if you are evaluating and /or   treating any of the following: The medical record reflects the following:  Risk Factors: Acute on chronic illness, limited mobility, gender  Clinical Indicators: Ht 5ft 8 in   Wt 56.87,  374 lbs  Treatment: fall precautions, bariatric equipment and weight based medications   as indicated , low carb diet    Thank Emilie Heller RN, BSN, [facility], [place]  Clinical   P: [phone]  F: [phone]  Options provided:  -- Morbid obesity  -- Severe obesity  -- Other - I will add my own diagnosis  -- Disagree - Not applicable / Not valid  -- Disagree - Clinically unable to determine / Unknown  -- Refer to Clinical Documentation Reviewer    PROVIDER RESPONSE TEXT:    This patient has severe obesity.     Query created by: Master Ochoa on 2023 8:44 AM      Electronically signed by:  Ector MOSLEY 2023 5:11 PM

## 2023-01-24 NOTE — CARE COORDINATION
Case Management Assessment  Initial Evaluation    Date/Time of Evaluation: 1/24/2023 2:29 PM  Assessment Completed by: Radha Corona RN    If patient is discharged prior to next notation, then this note serves as note for discharge by case management. Patient Name: Angella Mosquera                   YOB: 1955  Diagnosis: General weakness [R53.1]  New onset atrial fibrillation ([address] [I48.91]  Atrial fibrillation with rapid ventricular response ([address] [I48.91]  Fall, initial encounter [W19. XXXA]  Diabetes mellitus due to underlying condition with hyperglycemia, with long-term current use of insulin (HCC) [E08.65, Z79.4]                   Date / Time: 1/23/2023  2:07 PM  [facility]     Patient Admission Status: Inpatient   Readmission Risk (Low < 19, Mod (19-27), High > 27): Readmission Risk Score: 16.1    Current PCP: PATRICIO Stark NP  PCP verified by CM? Yes    Chart Reviewed: Yes      History Provided by: Patient  Patient Orientation: Alert and Oriented    Patient Cognition: Alert    Hospitalization in the last 30 days (Readmission):  No    If yes, Readmission Assessment in  Navigator will be completed. Advance Directives:      Code Status: Full Code   Patient's Primary Decision Maker is: Legal Next of Kin    Supplemental (Other) Decision Maker: omidroyal page - Spouse - [phone]    Discharge Planning:    Patient lives with: Spouse/Significant Other Type of Home: House  Primary Care Giver: Self  Patient Support Systems include: Spouse/Significant Other, Family Members   Current Financial resources: Other (Comment) (commercial)  Current community resources: None  Current services prior to admission: Durable Medical Equipment            Current DME: Home Aerosol            Type of Home Care services:  None    ADLS  Prior functional level: Independent in ADLs/IADLs  Current functional level: Mobility    Family can provide assistance at DC:  Yes  Would you like Case Management to discuss the discharge plan with any other family members/significant others, and if so, who? No  Plans to Return to Present Housing: Yes  Other Identified Issues/Barriers to RETURNING to current housing: none  Potential Assistance needed at discharge: [address] DME:    Patient expects to discharge to: [address] [place] [address] for transportation at discharge: Family    Financial    Payor: Catie Lorenzo / Plan: [place] / Product Type: *No Product type* /     Does insurance require precert for SNF: Yes    Potential assistance Purchasing Medications: No  Meds-to-Beds request: Yes      [facility] [place], [place] - [address] [place] [id number]  Phone: [phone] Fax: [phone]      Notes:    Factors facilitating achievement of predicted outcomes: Family support and Cooperative    Barriers to discharge: Medical complications    Additional Case Management Notes: CRP 3.09, BNP 2195, INR 1.26, +covid. Hypertensive, 172/111. Telemetry, Afib 70-80s. Heparin gtt. Cardio consult pending. Echo ordered. PT/OT. The Plan for Transition of Care is related to the following treatment goals of General weakness [R53.1]  New onset atrial fibrillation (HCC) [I48.91]  Atrial fibrillation with rapid ventricular response ([address] [I48.91]  Fall, initial encounter [W19. XXXA]  Diabetes mellitus due to underlying condition with hyperglycemia, with long-term current use of insulin (HCC) [E08.65, Z79.4]    Patient Goals/Plan/Treatment Preferences: Met with Zain White, she is from home with her . She is interested in [facility] services for PT/OT. Will consult . Transportation/Food Security/Housekeeping Addressed: No issues identified.      Lisa Salazar RN  Case Management Department

## 2023-01-24 NOTE — ED NOTES
Assumed care at this time. Report received from [facility]. PT resting in bed. Family at bedside. PT and VS assessed. PT updated on POC.      Carine Bell RN  01/23/23 1921

## 2023-01-24 NOTE — PROGRESS NOTES
Hospitalist Progress Note      Patient:  Atilio [facility]    Unit/Bed:6K-08/008-A  YOB: 1955  MRN: [id number]   Acct: [de-identified]   PCP: PATRICIO Breen NP  Date of Admission: 1/23/2023    Assessment/Plan:    Atrial fibrillation, new onset, with RVR: Cardiology consulted. Heparin drip ordered. Patient's heart rate is currently controlled but continues to be in A. fib. Echo pending. Patient will need long-term [address] [place] [address] Road. Metoprolol started by Cardiology. COVID-19: Patient does not have respiratory symptoms. Supportive care. CAD: Patient denies chest pain at this time. Continue home aspirin, statin, beta-blocker. HFpEF, chronic: Pt appears mildly fluid overloaded. CXR does not show vascular congestion. We will give PO Lasix BID for now if cardiology recommendations and patient being SOB while speaking. Monitor closely. Essential hypertension: Blood pressure mildly elevated. Restarted Norvasc given Cardizem drip stopped. Continue home medications. PAD: Status post angioplasty to right SFA 11/2019. Aspirin. Insulin-dependent diabetes mellitus: Continue insulin regimen from home. GERD: Protonix. Case discussed with cardiology. Chief Complaint: Shortness of breath, fall    Initial H and P:-    Initial H&P \"Angle Griffin is a 69 y.o. female with a history of hypertension, HFpEF, hyperlipidemia, coronary artery disease, peripheral artery disease, insulin-dependent diabetes mellitus, gastroesophageal reflux disease, and history of gastric metaplasia who presented to [address] [place] [address] with chief complaint of fall and shortness of breath. The patient states this morning she was attempting to sit on the commode and states she missed and fell to the ground. She was brought to the emergency department for further evaluation. On arrival, she was noted to be in atrial fibrillation with rapid ventricular response.   She has no history of atrial fibrillation prior to this and had a loop recorder placed previously which did not reveal any evidence of atrial fibrillation. Incidentally, the patient tested positive for COVID-19. Her only symptom regarding her COVID-19 diagnosis is cough. She denies any shortness of breath, fever, chills, or myalgias. She denies any ongoing chest pain or palpitations. She was placed on a Cardizem drip for rate control. The patient has a history of HFpEF and takes Lasix as needed. She states she usually takes it approximately 2 times per week. She does have a remote history of GI bleed. Her last colonoscopy was late 2022 which revealed diverticular disease, but no polyps or bleeding. The patient does not recall the last time she had a GI bleed and is uncertain the severity of her bleed or what the etiology was. \"     Subjective (past 24 hours):   No acute events overnight. Patient resting in bed eating lunch during evaluation. Patient short of breath while speaking. Patient denies chest pain. No other issues or concerns at this time. Past medical history, family history, social history and allergies reviewed again and is unchanged since admission. ROS (All review of systems completed. Pertinent positives noted.  Otherwise All other systems reviewed and negative.)     Medications:  Reviewed    Infusion Medications    sodium chloride      dextrose      heparin (PORCINE) Infusion 10 Units/kg/hr (01/24/23 3699)     Scheduled Medications    metoprolol tartrate  25 mg Oral BID    sodium chloride flush  5-40 mL IntraVENous 2 times per day    allopurinol  300 mg Oral Daily    [Held by provider] amLODIPine  10 mg Oral Daily    aspirin  81 mg Oral Daily    atorvastatin  40 mg Oral Nightly    colchicine  0.6 mg Oral Daily    losartan  50 mg Oral Daily    pantoprazole  40 mg Oral QAM AC    potassium chloride  10 mEq Oral Daily    spironolactone  25 mg Oral Daily    insulin glargine  20 Units SubCUTAneous Nightly insulin lispro  0-4 Units SubCUTAneous TID     insulin lispro  0-4 Units SubCUTAneous Nightly     PRN Meds: levalbuterol, sodium chloride flush, sodium chloride, ondansetron **OR** ondansetron, polyethylene glycol, acetaminophen **OR** acetaminophen, glucose, dextrose bolus **OR** dextrose bolus, glucagon (rDNA), dextrose, heparin (porcine), heparin (porcine)      Intake/Output Summary (Last 24 hours) at 1/24/2023 1617  Last data filed at 1/24/2023 1122  Gross per 24 hour   Intake 520 ml   Output 650 ml   Net -130 ml       Diet:  ADULT DIET; Regular; 4 carb choices (60 gm/meal); Low Sodium (2 gm); 2000 ml  Diet NPO    Physical Exam:  BP (!) 175/99   Pulse 92   Temp 99.9 °F (37.7 °C) (Oral)   Resp 21   Ht 5' 8\" (1.727 m)   Wt (!) 374 lb (169.6 kg)   SpO2 94%   BMI 56.87 kg/m²   General appearance: Chronically ill-appearing black female, obese  HEENT: Pupils equal, round, and reactive to light. Conjunctivae/corneas clear. Neck: Supple, with full range of motion. No jugular venous distention. Trachea midline. Respiratory:  Normal respiratory effort. Clear to auscultation, bilaterally without Rales/Wheezes/Rhonchi. Cardiovascular: Irregular rate and rhythm with normal S1/S2 without murmurs, rubs or gallops. Abdomen: Soft, non-tender, non-distended with normal bowel sounds. Musculoskeletal: passive and active ROM x 4 extremities. Skin: Skin color, texture, turgor normal.  No rashes or lesions. Neurologic:  Neurovascularly intact without any focal sensory/motor deficits.  Cranial nerves: II-XII intact, grossly non-focal.  Psychiatric: Alert and oriented, anxious   Capillary Refill: Brisk,< 3 seconds   Peripheral Pulses: +2 palpable, equal bilaterally     Labs:   Recent Labs     01/23/23  1447 01/24/23  0505   WBC 8.6 8.9   HGB 15.2 14.7   HCT 47.0 45.8    239     Recent Labs     01/23/23  1447 01/24/23  0505   * 135   K 4.4 4.5   CL 98 98   CO2 24 26   BUN 8 8   CREATININE 0.8 0.7   CALCIUM 8.9 8. 6   PHOS  --  3.0     Recent Labs     01/23/23  1447   AST 16   ALT 13   BILIDIR <0.2   BILITOT 0.7   ALKPHOS 63     Recent Labs     01/23/23  1823   INR 1.26*     No results for input(s): Lin Verma in the last 72 hours. Microbiology:    Blood culture #1:   Lab Results   Component Value Date/Time    [facility]  12/12/2020 03:30 AM     No growth-preliminary No growth-preliminary No growth       Blood culture #2:No results found for: Cong Mosquera    Organism:  Lab Results   Component Value Date/Time    ORG Mixed Growth 06/07/2018 03:15 AM         Lab Results   Component Value Date/Time    LABGRAM  02/21/2015 10:00 AM     Few segmented neutrophils observed. No epithelial cells observed. No bacteria seen. MRSA culture only:No results found for: [facility]    Urine culture: No results found for: LABURIN    Respiratory culture: No results found for: CULTRESP    Aerobic and Anaerobic :  Lab Results   Component Value Date/Time    LABAERO  02/21/2015 10:00 AM     No growth-preliminary  Culture yielded light growth of multiple colony types of gram  positive bacilli morphologically consistent with  Corynebacterium species. Clinical correlation required. Lab Results   Component Value Date/Time    LABANAE  02/21/2015 10:00 AM     Culture yielded moderate mixed anaerobic growth of gram  positive bacilli and gram positive cocci. If a true mixed aerobic and anaerobic infection is suspected,  then broad spectrum empiric antibiotic therapy is indicated  and should include coverage for anaerobic organisms. Urinalysis:      Lab Results   Component Value Date/Time    NITRU NEGATIVE 01/23/2023 03:45 PM    WBCUA 0-2 01/23/2023 03:45 PM    BACTERIA FEW 01/23/2023 03:45 PM    RBCUA 0-2 01/23/2023 03:45 PM    BLOODU SMALL 01/23/2023 03:45 PM    GLUCOSEU NEGATIVE 01/23/2023 03:45 PM       Radiology:  XR CHEST PORTABLE   Final Result   1. No interval change since previous study dated 10 of October 2022. Brianna Haines **This report has been created using voice recognition software. It may contain minor errors which are inherent in voice recognition technology. **      Final report electronically signed by DR Carl Miller on 1/23/2023 2:49 PM        XR CHEST PORTABLE    Result Date: 1/23/2023  PROCEDURE: XR CHEST PORTABLE CLINICAL INFORMATION: sob. COMPARISON: Chest x-ray dated 10/10/2022. TECHNIQUE: AP upright view of the chest. FINDINGS: The heart size is normal.The mediastinum is not widened. There are no pulmonary infiltrates or effusions. The pulmonary vascularity is normal. There is thoracic spondylosis. There is a right shoulder replacement. 1. No interval change since previous study dated 10 of October 2022. Vearl Pippins **This report has been created using voice recognition software. It may contain minor errors which are inherent in voice recognition technology. ** Final report electronically signed by DR Carl Miller on 1/23/2023 2:49 PM      Electronically signed by DIMITRI Brar on 1/24/2023 at 4:17 PM

## 2023-01-24 NOTE — PROGRESS NOTES
Pt was in bed and alone as she was dealing with covid coupled with A-fib issues. She looked restless but wanted prayer to cope and heal. She was blessed.     01/24/23 1435   Encounter Summary   Encounter Overview/Reason  Initial Encounter   Service Provided For: Patient   Referral/Consult From: [address] [facility] Family members   Last Encounter  01/24/23   Complexity of Encounter Low   Begin Time 1340   End Time  1345   Total Time Calculated 5 min   Spiritual/Emotional needs   Type Spiritual Support   Assessment/Intervention/Outcome   Assessment Anxious

## 2023-01-24 NOTE — PROGRESS NOTES
[address] [address]  INPATIENT PHYSICAL THERAPY  EVALUATION  [facility] RENAL TELEMETRY 6K - 6K-08/008-A    Time In: 8527  Time Out: 1414  Timed Code Treatment Minutes: 23 Minutes  Minutes: 31          Date: 2023  Patient Name: Anna Palma,  Gender:  female        MRN: [id number]  : 1955  (69 y.o.)      Referring Practitioner: Julien Judge PA-C  Diagnosis: general weakness  Additional Pertinent Hx: Per H&P : Anna Palma is a 69 y.o. female with a history of hypertension, HFpEF, hyperlipidemia, coronary artery disease, peripheral artery disease, insulin-dependent diabetes mellitus, gastroesophageal reflux disease, and history of gastric metaplasia who presented to [facility] with chief complaint of fall and shortness of breath. The patient states this morning she was attempting to sit on the commode and states she missed and fell to the ground. She was brought to the emergency department for further evaluation. On arrival, she was noted to be in atrial fibrillation with rapid ventricular response. She has no history of atrial fibrillation prior to this and had a loop recorder placed previously which did not reveal any evidence of atrial fibrillation. Incidentally, the patient tested positive for COVID-19. Her only symptom regarding her COVID-19 diagnosis is cough. Pt pressents with new A-Fib and COVID     Restrictions/Precautions:  Restrictions/Precautions: General Precautions, Fall Risk, Isolation  Position Activity Restriction  Other position/activity restrictions: COVID, monitor HR    Subjective:  Chart Reviewed: Yes  Patient assessed for rehabilitation services?: Yes  Family / Caregiver Present: No  Subjective: RN approved session, states she wanted to provide heart medication prior to mobility. Pt agreed for PT evaluation. Pt noted to have increased work of breathing at rest and intermittent coughing episodes.  Following mobility pt and RN edcuated that pt will need to improve her mobility for safe return home. General:  Overall Orientation Status: Impaired  Orientation Level: Disoriented to time, Oriented to situation, Oriented to person, Oriented to time  Vision: Within Functional Limits  Hearing: Exceptions to [facility]  Hearing Exceptions: Hard of hearing/hearing concerns     Pain: yes, states pain in her R knee, attributes to arthritis     Vitals: Oxygen: 92%  Heart Rate: 76-89 range  RN administered heart medication prior to mobility     Social/Functional History:    Lives With: Spouse, Son  Type of Home: House  Home Layout: One level  Home Access: Ramped entrance  Home Equipment: N[employer]at[employer] Noli, rolling     Bathroom Shower/Tub: Walk-in shower  Bathroom Toilet: Standard  Bathroom Equipment: Commode  Bathroom Accessibility: Accessible    Receives Help From: Family  ADL Assistance: [address] [place] [address] Avenue: Independent  Homemaking Responsibilities: Yes  Ambulation Assistance: Independent  Transfer Assistance: Needs assistance    Active : Yes     Additional Comments: Pt states she \"can't use\" her RW. She has assist from her  for mobility, but has noticed increased weakness. She states her  is home and able to assist her. OBJECTIVE:  Range of Motion:  Bilateral Lower Extremity: WFL    Strength:  Bilateral Lower Extremity: Impaired - general weakness noted, she required min assist to bring B LE to the edge of the bed, possible 2+/5 globally at all joints    Balance:  Static Sitting Balance:  Minimal Assistance, with verbal cues , with increased time for completion  *Pt sat EOB for 3 mins with min A initially. Cues and assist required for improved limb positing. Pt then improved to CGA for short period of time but would then require min A again to regain posterior loss of balance. ~3-4 losses in stability noted.  Noted increased work of breathing with sitting EOB, returned to supine following this with cues for pursed lip breathing     Bed Mobility:  Supine to Sit: Moderate Assistance, with head of bed raised, with rail, with increased time for completion  Sit to Supine: Moderate Assistance, with rail, with increased time for completion     Pt able to elevate her trunk and bring B LE to the EOB with mod assist, but unable to scoot anteriorly towards the EOB for improved alignment. The head of the bed was lowered once sitting up to improve alignment, but pt unable to maintain balance seated upright without min A and repetitive cues     Transfers:  Not Tested    Ambulation:  Not Tested    Exercise:  Patient was guided in 1 set(s) 10 reps of exercise to both lower extremities. Ankle pumps, Quad sets, Heelslides, and Short arc quads. Exercises were completed for increased independence with functional mobility. *Cues and demo provided for appropriate completion of exercises. Cues to maintain within a tolerable range provided. Pt required cues and AAROM intermittently to increase attention to task and increase muscle activation for appropriate completion. Increased time for completion along with rest breaks and cues for pursed lip breathing required. Functional Outcome Measures: Completed  AM-PAC Inpatient Mobility without Stair Climbing Raw Score : 7  AM-PAC Inpatient without Stair Climbing T-Scale Score : 28.66    ASSESSMENT:  Activity Tolerance:  Patient tolerance of  treatment: fair. Pt with increased work of breathing with audible wheezing, maintained her eyes closed and required cues to reorient to task or questions and to open her eyes. Pt also with increased pain at her R knee. Today's session was limited by pt's work of breathing and increased fatigue. Communicated concern with her current level of mobility and plan to return home. Will need to improve this for safety. Treatment Initiated: Treatment and education initiated within context of evaluation.   Evaluation time included review of current medical information, gathering information related to past medical, social and functional history, completion of standardized testing, formal and informal observation of tasks, assessment of data and development of plan of care and goals. Treatment time included skilled education and facilitation of tasks to increase safety and independence with functional mobility for improved independence and quality of life. Assessment: Body Structures, Functions, Activity Limitations Requiring Skilled Therapeutic Intervention: Decreased functional mobility , Decreased strength, Decreased safe awareness, Decreased balance, Decreased endurance  Assessment: This patient is a 69 y.o. who presents with COVID, Afib and weakness. This is a decline from the patient's baseline status of requiring assist from her  for mobility in the home. The patient is observed to have deficits in strength, balance, activity tolerance, and safety awareness and would benefit from skilled PT services to progress functional mobility, safety awareness, and to decrease overall risk of falls. Pt would benefit from increased assist, AD, continued PT and possibly a therapy stay to progress her safe mobility. Therapy Prognosis: Good    Requires PT Follow-Up: Yes    Discharge Recommendations:  Discharge Recommendations: Continue to assess pending progress, Patient would benefit from continued therapy after discharge, 24 hour supervision or assist, Other (comment) (inpatient therapy stay)    Patient Education:      .     Patient Education  Education Given To: Patient  Education Provided: Role of Therapy, Plan of Care  Education Method: Demonstration, Verbal  Education Outcome: Continued education needed       Equipment Recommendations:  Equipment Needed: No (depending on how her mobility progresses, she may require a wheelchair)    Plan:  Current Treatment Recommendations: Strengthening, Balance training, Functional mobility training, Transfer training, Endurance training, Neuromuscular re-education, Home exercise program, Safety education & training, Patient/Caregiver education & training, Equipment evaluation, education, & procurement, Therapeutic activities  General Plan:  (5x C)    Goals:  Patient Goals : none stated  Short Term Goals  Time Frame for Short Term Goals: by hospital d/c  Short Term Goal 1: Pt to complete supine <->sit with CGA for ease getting out of bed  Short Term Goal 2: Pt to demo seated balance edge of bed with SBA for >=10 minutes with no loss of stability to progress her safe mobility  Short Term Goal 3: Pt to complete x10 B LE exercises with SBA and verbal cues for improved strength  Long Term Goals  Time Frame for Long Term Goals : NA due to short ELOS    Following session, patient left in safe position with all fall risk precautions in place.

## 2023-01-24 NOTE — PROGRESS NOTES
This virtual nurse completed admission requirements and educated patient to use call light to report any change in condition to bedside nursing staff. Patient stated she understood.

## 2023-01-25 ENCOUNTER — ANESTHESIA (OUTPATIENT)
Dept: ENDOSCOPY | Age: 68
DRG: 308 | End: 2023-01-25
Payer: COMMERCIAL

## 2023-01-25 ENCOUNTER — ANESTHESIA EVENT (OUTPATIENT)
Dept: ENDOSCOPY | Age: 68
DRG: 308 | End: 2023-01-25
Payer: COMMERCIAL
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
## 2023-01-25 LAB
ANION GAP SERPL CALC-SCNC: 12 MEQ/L (ref 8–16)
BASOPHILS ABSOLUTE: 0 THOU/MM3 (ref 0–0.1)
BASOPHILS NFR BLD AUTO: 0.6 %
BUN SERPL-MCNC: 11 MG/DL (ref 7–22)
CALCIUM SERPL-MCNC: 8.4 MG/DL (ref 8.5–10.5)
CHLORIDE SERPL-SCNC: 98 MEQ/L (ref 98–111)
CO2 SERPL-SCNC: 20 MEQ/L (ref 23–33)
CREAT SERPL-MCNC: 0.8 MG/DL (ref 0.4–1.2)
DEPRECATED RDW RBC AUTO: 47 FL (ref 35–45)
EKG ATRIAL RATE: 396 BPM
EKG Q-T INTERVAL: 368 MS
EKG QRS DURATION: 86 MS
EKG QTC CALCULATION (BAZETT): 421 MS
EKG R AXIS: 52 DEGREES
EKG T AXIS: 30 DEGREES
EKG VENTRICULAR RATE: 79 BPM
EOSINOPHIL NFR BLD AUTO: 2.3 %
EOSINOPHILS ABSOLUTE: 0.2 THOU/MM3 (ref 0–0.4)
ERYTHROCYTE [DISTWIDTH] IN BLOOD BY AUTOMATED COUNT: 16.6 % (ref 11.5–14.5)
GFR SERPL CREATININE-BSD FRML MDRD: > 60 ML/MIN/1.73M2
GLUCOSE BLD STRIP.AUTO-MCNC: 146 MG/DL (ref 70–108)
GLUCOSE BLD STRIP.AUTO-MCNC: 151 MG/DL (ref 70–108)
GLUCOSE BLD STRIP.AUTO-MCNC: 165 MG/DL (ref 70–108)
GLUCOSE BLD STRIP.AUTO-MCNC: 177 MG/DL (ref 70–108)
GLUCOSE SERPL-MCNC: 169 MG/DL (ref 70–108)
HCT VFR BLD AUTO: 49.4 % (ref 37–47)
HEPARIN UNFRACTIONATED: 0.49 U/ML (ref 0.3–0.7)
HGB BLD-MCNC: 15.9 GM/DL (ref 12–16)
IMM GRANULOCYTES # BLD AUTO: 0.02 THOU/MM3 (ref 0–0.07)
IMM GRANULOCYTES NFR BLD AUTO: 0.3 %
LYMPHOCYTES ABSOLUTE: 1.5 THOU/MM3 (ref 1–4.8)
LYMPHOCYTES NFR BLD AUTO: 20.8 %
MCH RBC QN AUTO: 26.7 PG (ref 26–33)
MCHC RBC AUTO-ENTMCNC: 32.2 GM/DL (ref 32.2–35.5)
MCV RBC AUTO: 82.9 FL (ref 81–99)
MONOCYTES ABSOLUTE: 1 THOU/MM3 (ref 0.4–1.3)
MONOCYTES NFR BLD AUTO: 14.1 %
NEUTROPHILS NFR BLD AUTO: 61.9 %
NRBC BLD AUTO-RTO: 0 /100 WBC
PLATELET # BLD AUTO: 152 THOU/MM3 (ref 130–400)
PMV BLD AUTO: 10 FL (ref 9.4–12.4)
POTASSIUM SERPL-SCNC: 4.2 MEQ/L (ref 3.5–5.2)
RBC # BLD AUTO: 5.96 MILL/MM3 (ref 4.2–5.4)
SEGMENTED NEUTROPHILS ABSOLUTE COUNT: 4.3 THOU/MM3 (ref 1.8–7.7)
SODIUM SERPL-SCNC: 130 MEQ/L (ref 135–145)
WBC # BLD AUTO: 7 THOU/MM3 (ref 4.8–10.8)

## 2023-01-25 PROCEDURE — 6370000000 HC RX 637 (ALT 250 FOR IP): Performed by: PHYSICIAN ASSISTANT

## 2023-01-25 PROCEDURE — 6370000000 HC RX 637 (ALT 250 FOR IP): Performed by: STUDENT IN AN ORGANIZED HEALTH CARE EDUCATION/TRAINING PROGRAM

## 2023-01-25 PROCEDURE — 99233 SBSQ HOSP IP/OBS HIGH 50: CPT | Performed by: PHYSICIAN ASSISTANT

## 2023-01-25 PROCEDURE — 94640 AIRWAY INHALATION TREATMENT: CPT

## 2023-01-25 PROCEDURE — 93010 ELECTROCARDIOGRAM REPORT: CPT | Performed by: INTERNAL MEDICINE

## 2023-01-25 PROCEDURE — 1200000000 HC SEMI PRIVATE

## 2023-01-25 PROCEDURE — 80048 BASIC METABOLIC PNL TOTAL CA: CPT

## 2023-01-25 PROCEDURE — 85025 COMPLETE CBC W/AUTO DIFF WBC: CPT

## 2023-01-25 PROCEDURE — 36415 COLL VENOUS BLD VENIPUNCTURE: CPT

## 2023-01-25 PROCEDURE — 85520 HEPARIN ASSAY: CPT

## 2023-01-25 PROCEDURE — 83930 ASSAY OF BLOOD OSMOLALITY: CPT

## 2023-01-25 PROCEDURE — 3609017100 HC EGD: Performed by: INTERNAL MEDICINE

## 2023-01-25 PROCEDURE — 97530 THERAPEUTIC ACTIVITIES: CPT

## 2023-01-25 PROCEDURE — 6360000002 HC RX W HCPCS: Performed by: PHYSICIAN ASSISTANT

## 2023-01-25 PROCEDURE — 82948 REAGENT STRIP/BLOOD GLUCOSE: CPT

## 2023-01-25 PROCEDURE — 6360000002 HC RX W HCPCS: Performed by: STUDENT IN AN ORGANIZED HEALTH CARE EDUCATION/TRAINING PROGRAM

## 2023-01-25 PROCEDURE — 97110 THERAPEUTIC EXERCISES: CPT

## 2023-01-25 PROCEDURE — 99232 SBSQ HOSP IP/OBS MODERATE 35: CPT | Performed by: PHYSICIAN ASSISTANT

## 2023-01-25 PROCEDURE — 97535 SELF CARE MNGMENT TRAINING: CPT

## 2023-01-25 RX ORDER — BUMETANIDE 1 MG/1
1 TABLET ORAL 2 TIMES DAILY
Status: DISCONTINUED | OUTPATIENT
Start: 2023-01-25 | End: 2023-01-25

## 2023-01-25 RX ORDER — BUMETANIDE 1 MG/1
1 TABLET ORAL 2 TIMES DAILY
Status: DISCONTINUED | OUTPATIENT
Start: 2023-01-25 | End: 2023-01-26 | Stop reason: HOSPADM

## 2023-01-25 RX ADMIN — ALLOPURINOL 300 MG: 300 TABLET ORAL at 09:42

## 2023-01-25 RX ADMIN — AMLODIPINE BESYLATE 10 MG: 10 TABLET ORAL at 09:42

## 2023-01-25 RX ADMIN — ATORVASTATIN CALCIUM 40 MG: 40 TABLET, FILM COATED ORAL at 21:43

## 2023-01-25 RX ADMIN — BUMETANIDE 1 MG: 1 TABLET ORAL at 17:25

## 2023-01-25 RX ADMIN — LEVALBUTEROL HYDROCHLORIDE 1.25 MG: 1.25 SOLUTION RESPIRATORY (INHALATION) at 09:09

## 2023-01-25 RX ADMIN — METOPROLOL TARTRATE 25 MG: 25 TABLET, FILM COATED ORAL at 09:42

## 2023-01-25 RX ADMIN — METOPROLOL TARTRATE 25 MG: 25 TABLET, FILM COATED ORAL at 21:43

## 2023-01-25 RX ADMIN — LOSARTAN POTASSIUM 50 MG: 50 TABLET, FILM COATED ORAL at 09:42

## 2023-01-25 RX ADMIN — HEPARIN SODIUM 10 UNITS/KG/HR: 10000 INJECTION, SOLUTION INTRAVENOUS at 09:56

## 2023-01-25 RX ADMIN — COLCHICINE 0.6 MG: 0.6 TABLET, FILM COATED ORAL at 09:42

## 2023-01-25 RX ADMIN — PANTOPRAZOLE SODIUM 40 MG: 40 TABLET, DELAYED RELEASE ORAL at 06:33

## 2023-01-25 RX ADMIN — ASPIRIN 81 MG: 81 TABLET, CHEWABLE ORAL at 09:42

## 2023-01-25 RX ADMIN — LEVALBUTEROL HYDROCHLORIDE 1.25 MG: 1.25 SOLUTION RESPIRATORY (INHALATION) at 18:06

## 2023-01-25 RX ADMIN — POTASSIUM CHLORIDE 10 MEQ: 750 TABLET, EXTENDED RELEASE ORAL at 09:42

## 2023-01-25 RX ADMIN — INSULIN GLARGINE 20 UNITS: 100 INJECTION, SOLUTION SUBCUTANEOUS at 21:46

## 2023-01-25 RX ADMIN — FUROSEMIDE 40 MG: 40 TABLET ORAL at 09:42

## 2023-01-25 RX ADMIN — SPIRONOLACTONE 25 MG: 25 TABLET ORAL at 09:42

## 2023-01-25 ASSESSMENT — PAIN - FUNCTIONAL ASSESSMENT: PAIN_FUNCTIONAL_ASSESSMENT: NONE - DENIES PAIN

## 2023-01-25 NOTE — PROGRESS NOTES
[facility] RENAL TELEMETRY 6K  Occupational Therapy  Daily Note  Time:   Time In: 9508  Time Out: 4643  Timed Code Treatment Minutes: 40 Minutes  Minutes: 40          Date: 2023  Patient Name: Delbert Rai,   Gender: female      Room: [facility]008-A  MRN: [id number]  : 1955  (69 y.o.)  Referring Practitioner: Faisal Rutledge PA-C  Diagnosis: General Weakness  Additional Pertinent Hx: This is a pleasant 69 y.o. female nonsmoker with a PMH CAD, PAD s/p R SFA angioplasty, mild-mod AS, HTN, HLD, DMII, GERD, asthma and history of gastric metaplasia presents for shortness of breath and fall. Per report that patient missed sitting on the commode, and subsequently fell. The patient reports she has had increased shortness of breath over the past couple of days. This was associated with orthopnea, PND but no BLE edema. She denied any CP, SOB or palpitations. Upon evaluation in the ED the patient was found in new onset Afib with RVR, with initial EKG showing Afib in RVR with PVCs,  and Qtc 458. She denies a prior history of Afib, with a loop recorder previously placed that demonstrated no afib. The patient did test positive for COVID-19. She was started om cardiem gtt, but this was discontinued due to HR dropping into 40s. Of note, the patient does have a recent history of GIB. A colonoscopy in  revealed diverticular disease. She currently denies any active dark or red stools. Cardiac history is significant for last Echo EF 60% with no regional wall motion abnormalities, mild-moderate AS with MJ 1.4 sq cm, max AVG 16 mmHg, mean gradient 9 mmHg, and peak velocity 2 m/s 2021. Prior cath demonstrated 80-90% stenosis of R SFA s/p angioplasty, 30-40% L SFA, 50% L popliteal and 40% R popliteal stenosis 2019. Negative stress test 2017.     Restrictions/Precautions:  Restrictions/Precautions: General Precautions, Fall Risk, Isolation  Position Activity Restriction  Other position/activity restrictions: COVID, monitor HR     SUBJECTIVE: Pt lying supine on bedpan upon arrival, agreeable to OT session. RN present and requesting assist for safety up to chair as she is more alert today. PAIN: No c/o. Vitals: Oxygen & Heart Rate: WNL for spot check following mobility to chair. COGNITION: Slow Processing, Decreased Recall, Decreased Problem Solving, and Decreased Safety Awareness    ADL:   Grooming: with set-up. Washing face seated in bedside chair  Toileting: Dependent. For bedpan use and hygiene after BM. BALANCE:  Sitting Balance:  Stand By Assistance, X 2. Pt seated EOB for extended time to allow for deep breathing and to recover from fatigue prior to trial up to chair. Educated for proper deep breathing tech within activity. Standing Balance. BED MOBILITY:  Rolling to Left: Maximum Assistance, X 1   Rolling to Right: Maximum Assistance, X 1   Supine to Sit: Maximum Assistance, X 1   Scooting: Contact Guard Assistance EOB    TRANSFERS & FUNCTIONAL MOBILITY:  OTR to further assess 1/26. This ROSAS provided safety assistance with RN for Transfer trial with Sandrine from EOB and CGA for mobility at short distance to chair- pt mildly unsteady with increased time to complete mobility. Pt needing cues to keep RW proximal to ALAYNA and avoiding tangling lines. ADDITIONAL ACTIVITIES:  Patient identified a personal goal to increase UB strength and improve overall endurance so they can complete their toilet & shower transfers; skilled edu on UE strengthening. Completed BUE AROM exercises x10 reps x1 set in all joints/planes to increase strength and endurance required for ADLs. Pt required min rest break between each exercise and min v/c for proper technique. ASSESSMENT:     Activity Tolerance:  Patient tolerance of  treatment: fair. Discharge Recommendations: Continue to assess pending progress- Home with [facility] OT vs. SNF.   Equipment Recommendations: Equipment Needed: Yes  Other: defer to next level of care, monitor progress  Plan: Times Per Week: 3-5x  Current Treatment Recommendations: Strengthening, Balance training, Endurance training, Self-Care / ADL, Safety education & training    Patient Education  Patient Education: ADL's, Energy Conservation, Home Exercise Program, and Importance of Increasing Activity    Goals  Short Term Goals  Time Frame for Short Term Goals: by discharge  Short Term Goal 1: Pt will tolerate sitting EOB x8 min with consistent Min A and min VC for upright posture to increase indep with ADLs  Short Term Goal 2: Pt will complete EOB ADLs with Min A and min VC for adaptation to increase indep with dressing  Short Term Goal 3: OTR to assess t/fs and mobility as appropriate    Following session, patient left in safe position with all fall risk precautions in place.

## 2023-01-25 NOTE — PLAN OF CARE
Problem: Respiratory - Adult  Goal: Clear lung sounds  1/25/2023 1809 by Shaneka Gleason RCP  Outcome: Progressing   Continue treatments as ordered per to help facilitate normal breath sounds.  Pt agreeable to plan

## 2023-01-25 NOTE — PROGRESS NOTES
Dr Awais Lopez notified pt on a Heparin drip. EGD on hold at this time. 6K nurse called and notified.

## 2023-01-25 NOTE — CARE COORDINATION
1/25/23, 12:52 PM EST    DISCHARGE ON GOING EVALUATION    [address] [place] [address] day: 2  Location: -08/008-A Reason for admit: General weakness [R53.1]  New onset atrial fibrillation ([address] [I48.91]  Atrial fibrillation with rapid ventricular response ([address] [I48.91]  Fall, initial encounter [W19. XXXA]  Diabetes mellitus due to underlying condition with hyperglycemia, with long-term current use of insulin (HCC) [E08.65, Z79.4]   Procedure: 1/23: CXR: No interval change since previous study dated 10 of October 2022  1/24: ECHO: pending read  Barriers to Discharge: PT/OT following, Heparin gtt, cardiology following, on tele  PCP: PATRICIO Kidd NP  Readmission Risk Score: 16.9%  Patient Goals/Plan/Treatment Preferences: Plan to return home with family and new HH.  SW following

## 2023-01-25 NOTE — PROGRESS NOTES
Virtual nurse completed safety rounds, imitated camera as patient did not respond to verbal introduction and patient to be found resting quietly in bed with eyes closes,

## 2023-01-25 NOTE — CARE COORDINATION
DISCHARGE PLANNING EVALUATION  1/25/23, 10:29 AM EST    Reason for Referral: interested in [address] [place] [address] Status: pt is alert and oriented   Decision Making: pt is able to make own decisions   Family/Social/Home Environment: pt resides with her spouse and grown son in a one story home with ramped entrance. Pt had been independent with personal care, cooking and cleaning prior to admission. Family provides transportation. Current Services including food security, transportation and housekeeping: see above  Current Equipment:walker, commode  Payment Source:[employer]/Medicare  Concerns or Barriers to Discharge: none noted  Post-acute [facility]) provider list was provided to patient. Patient was informed of their freedom to choose [facility] provider. Discussed and offered to show the patient the relevant [facility] Providers quality and resource use measures on Medicare Compare web site via computer based on patient's goals of care and treatment preferences. Questions regarding selection process were answered. Teach Back Method used with pt regarding care plan and benefitss of ECF for rehab vs [facility]  Patient and spouse verbalize understanding of the plan of care and contribute to goal setting. Patient goals, treatment preferences and discharge plan: SW spoke with pts spouse, Matha Gilford. Discussed discharge POC. SW advised Matha Gilford on how pt did with PT/OT. Matha Gilford stated that he and his son can assist pt at home. JOSUE discussed ECF for short rehab stay vs Home Health. Matha Gilford denies ECF and is agreeable with Home Health. [facility] list offered, Matha Gilford declined stating that they have had [facility] in past.  SW updated RN Adela Colin. Attending updated. OT to work with pt again today.      Electronically signed by KIMBERLY Ramos on 1/25/2023 at 10:29 AM

## 2023-01-25 NOTE — PLAN OF CARE
Problem: Discharge Planning  Goal: Discharge to home or other facility with appropriate resources  Outcome: Progressing     Problem: Skin/Tissue Integrity  Goal: Absence of new skin breakdown  Description: 1. Monitor for areas of redness and/or skin breakdown  2. Assess vascular access sites hourly  3. Every 4-6 hours minimum:  Change oxygen saturation probe site  4. Every 4-6 hours:  If on nasal continuous positive airway pressure, respiratory therapy assess nares and determine need for appliance change or resting period. Outcome: Progressing     Problem: Safety - Adult  Goal: Free from fall injury  Outcome: Progressing  Goal: Isolation precautions  Description: Isolation precautions  Outcome: Progressing     Problem: Chronic Conditions and Co-morbidities  Goal: Patient's chronic conditions and co-morbidity symptoms are monitored and maintained or improved  Outcome: Progressing   Care plan reviewed with patient. Patient verbalizes understanding of the plan of care and contribute to goal setting.

## 2023-01-25 NOTE — PROGRESS NOTES
Hospitalist Progress Note      Patient:  Meghan Gold    Unit/Bed:6K-08/008-A  YOB: 1955  MRN: [id number]   Acct: [de-identified]   PCP: PATRICIO Medina NP  Date of Admission: 1/23/2023    Assessment/Plan:    Atrial fibrillation, new onset, with RVR (Resolved)  : Cardiology consulted and signed off- follow up OP in 2 weeks   Continue Heparin drip and transition to [address] [place] [address] Road on DC   Rate controlled with metoprolol   Echo completed without any concerning findings   COVID-19  No supplemental oxygen required- no therapeutic intervention at this time. Continue to monitor and supportive care as needed   Droplet precautions  CAD:   Patient denies chest pain at this time. Continue home aspirin, statin, beta-blocker. HFpEF, chronic:   Pt appears mildly fluid overloaded. CXR does not show vascular congestion. BNP elevated at 2195   Cardiology recommends to start lasix at this time along with aldactone and to continue on DC  Switch to bumex to see if she has more robust response  Monitor BMP daily '  I and Os, daily weights   Essential hypertension:   Blood pressure mildly elevated. Restarted Norvasc given Cardizem drip stopped. Continue home medications. PAD: Status post angioplasty to right SFA 11/2019. Aspirin. Insulin-dependent diabetes mellitus: Continue insulin regimen from home  Gastritis with history of Gastric metaplasia   Last EGD 2012 showing erosive gastritis with superficial gastric ulcer and metaplasia. Patient was supposed to proceed with EGD and colonoscopy with Dr Zoraida Patel but only completed Colonoscopy 10/5/2022  It is unclear but patient was scheduled for OP EGD with Dr Lindsey Blum today- she was brought down but procedure was cancelled after they realized she was on heparin drip  Dr Lindsey Blum planning on EGD tomorrow.  I question the appropriateness of this given currently hospitalization for new onset A fib and also being COVID (+). I recommend for EGD to be rescheduled to later date   Hyponatremia- corrected 132  BMP tomorrow AM   Serum osmo ordered    Case discussed with cardiology. Chief Complaint: Shortness of breath, fall    Initial H and P:-    Initial H&P \"Angle Loredo is a 69 y.o. female with a history of hypertension, HFpEF, hyperlipidemia, coronary artery disease, peripheral artery disease, insulin-dependent diabetes mellitus, gastroesophageal reflux disease, and history of gastric metaplasia who presented to [facility] with chief complaint of fall and shortness of breath. The patient states this morning she was attempting to sit on the commode and states she missed and fell to the ground. She was brought to the emergency department for further evaluation. On arrival, she was noted to be in atrial fibrillation with rapid ventricular response. She has no history of atrial fibrillation prior to this and had a loop recorder placed previously which did not reveal any evidence of atrial fibrillation. Incidentally, the patient tested positive for COVID-19. Her only symptom regarding her COVID-19 diagnosis is cough. She denies any shortness of breath, fever, chills, or myalgias. She denies any ongoing chest pain or palpitations. She was placed on a Cardizem drip for rate control. The patient has a history of HFpEF and takes Lasix as needed. She states she usually takes it approximately 2 times per week. She does have a remote history of GI bleed. Her last colonoscopy was late 2022 which revealed diverticular disease, but no polyps or bleeding. The patient does not recall the last time she had a GI bleed and is uncertain the severity of her bleed or what the etiology was. \"     1/24/2023  No acute events overnight. Patient resting in bed eating lunch during evaluation. Patient short of breath while speaking. Patient denies chest pain. No other issues or concerns at this time.     Subjective (past 24 hours):   No acute events overnight. VSS. Cardio signed off. Patient to have EGD tomorrow? See A and P   Rate well controlled with metoprolol. Past medical history, family history, social history and allergies reviewed again and is unchanged since admission. ROS (All review of systems completed. Pertinent positives noted. Otherwise All other systems reviewed and negative.)     Medications:  Reviewed    Infusion Medications    sodium chloride      dextrose      heparin (PORCINE) Infusion 10 Units/kg/hr (01/25/23 0956)     Scheduled Medications    metoprolol tartrate  25 mg Oral BID    furosemide  40 mg Oral BID    sodium chloride flush  5-40 mL IntraVENous 2 times per day    allopurinol  300 mg Oral Daily    amLODIPine  10 mg Oral Daily    aspirin  81 mg Oral Daily    atorvastatin  40 mg Oral Nightly    colchicine  0.6 mg Oral Daily    losartan  50 mg Oral Daily    pantoprazole  40 mg Oral QAM AC    potassium chloride  10 mEq Oral Daily    spironolactone  25 mg Oral Daily    insulin glargine  20 Units SubCUTAneous Nightly    insulin lispro  0-4 Units SubCUTAneous TID WC    insulin lispro  0-4 Units SubCUTAneous Nightly     PRN Meds: levalbuterol, sodium chloride flush, sodium chloride, ondansetron **OR** ondansetron, polyethylene glycol, acetaminophen **OR** acetaminophen, glucose, dextrose bolus **OR** dextrose bolus, glucagon (rDNA), dextrose, heparin (porcine), heparin (porcine)      Intake/Output Summary (Last 24 hours) at 1/25/2023 1552  Last data filed at 1/25/2023 0423  Gross per 24 hour   Intake 480 ml   Output 1200 ml   Net -720 ml         Diet:  Diet NPO  ADULT DIET; Regular; 4 carb choices (60 gm/meal);  Low Sodium (2 gm); 2000 ml    Physical Exam:  BP (!) 145/102   Pulse 84   Temp 98.2 °F (36.8 °C) (Oral)   Resp 22   Ht 5' 8\" (1.727 m)   Wt (!) 374 lb (169.6 kg)   SpO2 95%   BMI 56.87 kg/m²   General appearance: Chronically ill-appearing black female, obese  HEENT: Pupils equal, round, and reactive to light. Conjunctivae/corneas clear. Neck: Supple, with full range of motion. No jugular venous distention. Trachea midline. Respiratory:  Normal respiratory effort. Clear to auscultation, bilaterally without Rales/Wheezes/Rhonchi. Cardiovascular: Irregular rate and rhythm with normal S1/S2 without murmurs, rubs or gallops. 2+ edema bilaterally  Abdomen: Soft, non-tender, non-distended with normal bowel sounds. Musculoskeletal: passive and active ROM x 4 extremities. Skin: Skin color, texture, turgor normal.  No rashes or lesions. Neurologic:  Neurovascularly intact without any focal sensory/motor deficits. Cranial nerves: II-XII intact, grossly non-focal.  Psychiatric: Alert and oriented, anxious   Capillary Refill: Brisk,< 3 seconds   Peripheral Pulses: +2 palpable, equal bilaterally     Labs:   Recent Labs     01/23/23  1447 01/24/23  0505 01/25/23  0541   WBC 8.6 8.9 7.0   HGB 15.2 14.7 15.9   HCT 47.0 45.8 49.4*    239 152       Recent Labs     01/23/23  1447 01/24/23  0505 01/25/23  0541   * 135 130*   K 4.4 4.5 4.2   CL 98 98 98   CO2 24 26 20*   BUN 8 8 11   CREATININE 0.8 0.7 0.8   CALCIUM 8.9 8.6 8.4*   PHOS  --  3.0  --        Recent Labs     01/23/23  1447   AST 16   ALT 13   BILIDIR <0.2   BILITOT 0.7   ALKPHOS 63       Recent Labs     01/23/23  1823   INR 1.26*       No results for input(s): CKTOTAL, TROPONINI in the last 72 hours. Microbiology:    Blood culture #1:   Lab Results   Component Value Date/Time    [facility]  12/12/2020 03:30 AM     No growth-preliminary No growth-preliminary No growth       Blood culture #2:No results found for: Bobby Ramirezs    Organism:  Lab Results   Component Value Date/Time    ORG Mixed Growth 06/07/2018 03:15 AM         Lab Results   Component Value Date/Time    LABGRAM  02/21/2015 10:00 AM     Few segmented neutrophils observed. No epithelial cells observed. No bacteria seen. MRSA culture only:No results found for: [address] [place] [address]    Urine culture:  No results found for: LABURIN    Respiratory culture: No results found for: CULTRESP    Aerobic and Anaerobic :  Lab Results   Component Value Date/Time    LABAERO  02/21/2015 10:00 AM     No growth-preliminary  Culture yielded light growth of multiple colony types of gram  positive bacilli morphologically consistent with  Corynebacterium species. Clinical correlation required. Lab Results   Component Value Date/Time    LABANAE  02/21/2015 10:00 AM     Culture yielded moderate mixed anaerobic growth of gram  positive bacilli and gram positive cocci. If a true mixed aerobic and anaerobic infection is suspected,  then broad spectrum empiric antibiotic therapy is indicated  and should include coverage for anaerobic organisms. Urinalysis:      Lab Results   Component Value Date/Time    NITRU NEGATIVE 01/23/2023 03:45 PM    WBCUA 0-2 01/23/2023 03:45 PM    BACTERIA FEW 01/23/2023 03:45 PM    RBCUA 0-2 01/23/2023 03:45 PM    BLOODU SMALL 01/23/2023 03:45 PM    GLUCOSEU NEGATIVE 01/23/2023 03:45 PM       Radiology:  XR CHEST PORTABLE   Final Result   1. No interval change since previous study dated 10 of October 2022. Meron Cruz **This report has been created using voice recognition software. It may contain minor errors which are inherent in voice recognition technology. **      Final report electronically signed by DR Pato Mitchell on 1/23/2023 2:49 PM        XR CHEST PORTABLE    Result Date: 1/23/2023  PROCEDURE: XR CHEST PORTABLE CLINICAL INFORMATION: sob. COMPARISON: Chest x-ray dated 10/10/2022. TECHNIQUE: AP upright view of the chest. FINDINGS: The heart size is normal.The mediastinum is not widened. There are no pulmonary infiltrates or effusions. The pulmonary vascularity is normal. There is thoracic spondylosis. There is a right shoulder replacement. 1. No interval change since previous study dated 10 of October 2022. Meron Cruz **This report has been created using voice recognition software.  It may contain minor errors which are inherent in voice recognition technology. ** Final report electronically signed by DR Carolina Doherty on 1/23/2023 2:49 PM      Electronically signed by Alia Calix PA-C on 1/25/2023 at 3:52 PM

## 2023-01-25 NOTE — PROGRESS NOTES
Cardiology Progress Note      Patient:  Sasha Echavarria  YOB: 1955  MRN: [id number]   Acct: [de-identified]  Admit Date:  1/23/2023  Primary Cardiologist: Alirio Burk MD    Note per dr Chad Bridges \"CHIEF COMPLAINT: Fall and SOB        HPI: This is a pleasant 69 y.o. female nonsmoker with a PMH CAD, PAD s/p R SFA angioplasty, mild-mod AS, HTN, HLD, DMII, GERD, asthma and history of gastric metaplasia presents for shortness of breath and fall. Per report that patient missed sitting on the commode, and subsequently fell. The patient reports she has had increased shortness of breath over the past couple of days. This was associated with orthopnea, PND but no BLE edema. She denied any CP, SOB or palpitations. Upon evaluation in the ED the patient was found in new onset Afib with RVR, with initial EKG showing Afib in RVR with PVCs,  and Qtc 458. She denies a prior history of Afib, with a loop recorder previously placed that demonstrated no afib. The patient did test positive for COVID-19. She was started om cardiem gtt, but this was discontinued due to HR dropping into 40s. Of note, the patient does have a recent history of GIB. A colonoscopy in 2022 revealed diverticular disease. She currently denies any active dark or red stools. Cardiac history is significant for last Echo EF 60% with no regional wall motion abnormalities, mild-moderate AS with MJ 1.4 sq cm, max AVG 16 mmHg, mean gradient 9 mmHg, and peak velocity 2 m/s 12/2021. Prior cath demonstrated 80-90% stenosis of R SFA s/p angioplasty, 30-40% L SFA, 50% L popliteal and 40% R popliteal stenosis 11/2019. Negative stress test 6/2017. \"    Subjective (Events in last 24 hours): pt awake and alert. NAD. Mild sob at times with wheezing.   No cp  no edema  On RA      Objective:   BP (!) 145/102   Pulse 84   Temp 98.2 °F (36.8 °C) (Oral)   Resp 22   Ht 5' 8\" (1.727 m)   Wt (!) 374 lb (169.6 kg)   SpO2 95%   BMI 56.87 kg/m²        TELEMETRY: nsr    Physical Exam:  General Appearance: alert and oriented to person, place and time, in no acute distress  Cardiovascular: normal rate, regular rhythm, normal S1 and S2, no murmurs, rubs, clicks, or gallops, distal pulses intact, no carotid bruits, no JVD  Pulmonary/Chest: clear to auscultation bilaterally- no wheezes, rales or rhonchi, normal air movement, no respiratory distress  Abdomen: soft, non-tender, non-distended, normal bowel sounds, no masses Extremities: no cyanosis, clubbing or edema, pulse   Skin: warm and dry, no rash or erythema  Head: normocephalic and atraumatic  Eyes: pupils equal, round, and reactive to light  Neck: supple and non-tender without mass, no thyromegaly   Musculoskeletal: normal range of motion, no joint swelling, deformity or tenderness  Neurological: alert, oriented, normal speech, no focal findings or movement disorder noted    Medications:    metoprolol tartrate  25 mg Oral BID    furosemide  40 mg Oral BID    sodium chloride flush  5-40 mL IntraVENous 2 times per day    allopurinol  300 mg Oral Daily    amLODIPine  10 mg Oral Daily    aspirin  81 mg Oral Daily    atorvastatin  40 mg Oral Nightly    colchicine  0.6 mg Oral Daily    losartan  50 mg Oral Daily    pantoprazole  40 mg Oral QAM AC    potassium chloride  10 mEq Oral Daily    spironolactone  25 mg Oral Daily    insulin glargine  20 Units SubCUTAneous Nightly    insulin lispro  0-4 Units SubCUTAneous TID WC    insulin lispro  0-4 Units SubCUTAneous Nightly      sodium chloride      dextrose      heparin (PORCINE) Infusion 10 Units/kg/hr (01/25/23 0956)     levalbuterol, 1.25 mg, Q8H PRN  sodium chloride flush, 5-40 mL, PRN  sodium chloride, , PRN  ondansetron, 4 mg, Q8H PRN   Or  ondansetron, 4 mg, Q6H PRN  polyethylene glycol, 17 g, Daily PRN  acetaminophen, 650 mg, Q6H PRN   Or  acetaminophen, 650 mg, Q6H PRN  glucose, 4 tablet, PRN  dextrose bolus, 125 mL, PRN   Or  dextrose bolus, 250 mL, PRN  glucagon (rDNA), 1 mg, PRN  dextrose, , Continuous PRN  heparin (porcine), 4,000 Units, PRN  heparin (porcine), 2,000 Units, PRN        Diagnostics:  TTE  Summary   Normal left ventricular size and systolic function. There were no regional wall motion abnormalities. Mild concentric hypertrophy. Ejection fraction was estimated at 55-60%. IVC size is within normal limits with normal respiratory phasic changes. Signature      ----------------------------------------------------------------   Electronically signed by Tatum Burk MD (Interpreting   physician) on 01/25/2023 at 01:37 PM      Lab Data:    Cardiac Enzymes:  No results for input(s): CKTOTAL, CKMB, CKMBINDEX, TROPONINI in the last 72 hours.     CBC:   Lab Results   Component Value Date/Time    WBC 7.0 01/25/2023 05:41 AM    RBC 5.96 01/25/2023 05:41 AM    HGB 15.9 01/25/2023 05:41 AM    HCT 49.4 01/25/2023 05:41 AM     01/25/2023 05:41 AM       CMP:    Lab Results   Component Value Date/Time     01/25/2023 05:41 AM    K 4.2 01/25/2023 05:41 AM    CL 98 01/25/2023 05:41 AM    CO2 20 01/25/2023 05:41 AM    BUN 11 01/25/2023 05:41 AM    CREATININE 0.8 01/25/2023 05:41 AM    GFRAA >60 09/13/2022 02:31 PM    LABGLOM >60 01/25/2023 05:41 AM    GLUCOSE 169 01/25/2023 05:41 AM    CALCIUM 8.4 01/25/2023 05:41 AM       Hepatic Function Panel:    Lab Results   Component Value Date/Time    ALKPHOS 63 01/23/2023 02:47 PM    ALT 13 01/23/2023 02:47 PM    AST 16 01/23/2023 02:47 PM    PROT 6.8 01/23/2023 02:47 PM    BILITOT 0.7 01/23/2023 02:47 PM    BILIDIR <0.2 01/23/2023 02:47 PM    LABALBU 3.5 01/23/2023 02:47 PM       Magnesium:    Lab Results   Component Value Date/Time    MG 1.9 01/24/2023 05:05 AM       PT/INR:    Lab Results   Component Value Date/Time    PROTIME 1.53 10/16/2011 03:09 PM    INR 1.26 01/23/2023 06:23 PM       HgBA1c:    Lab Results   Component Value Date/Time    LABA1C 15.4 11/04/2020 12:42 PM       FLP:    Lab Results   Component Value Date/Time    TRIG 90 09/13/2022 02:31 PM    HDL 47 09/13/2022 02:31 PM    LDLCALC 90 06/07/2018 08:49 AM       TSH:    Lab Results   Component Value Date/Time    TSH 1.040 01/24/2023 05:05 AM         Assessment:    New onset afib rvr of unknown duration  Ef 60 per TTE 12/23/21, ef 55-60 per TTE 1/25/23  Mild to mod AS  PAD - hx R SFA angioplasty  HTN  HLD  DM  Hx gastric metaplasia  +COVID-19  Morbid obesity  ? VICKI  Hx CVA    Plan:    Keep mag >2 and k >4  Normal TSH  Cont asa/statin/BB/arb/aldactone/lasix  Cont heparin gtt  Recommend [facility] - pt understands risk of bleeding and accepts risk to reduce risk of embolic phenomenon - start eliquis 5 bid upon discharge  Consider pulmonary consult  Will follow prn  F/up dr Alexander Bolton 2 weeks      Electronically signed by Jose E Goodwin PA-C on 1/25/2023 at 12:57 PM

## 2023-01-26 ENCOUNTER — ANESTHESIA EVENT (OUTPATIENT)
Dept: ENDOSCOPY | Age: 68
DRG: 308 | End: 2023-01-26
Payer: COMMERCIAL

## 2023-01-26 ENCOUNTER — ANESTHESIA (OUTPATIENT)
Dept: ENDOSCOPY | Age: 68
DRG: 308 | End: 2023-01-26
Payer: COMMERCIAL

## 2023-01-26 VITALS
WEIGHT: 293 LBS | RESPIRATION RATE: 18 BRPM | SYSTOLIC BLOOD PRESSURE: 132 MMHG | BODY MASS INDEX: 44.41 KG/M2 | TEMPERATURE: 98.6 F | OXYGEN SATURATION: 98 % | DIASTOLIC BLOOD PRESSURE: 80 MMHG | HEART RATE: 87 BPM | HEIGHT: 68 IN

## 2023-01-26 LAB
ANION GAP SERPL CALC-SCNC: 10 MEQ/L (ref 8–16)
BASOPHILS ABSOLUTE: 0 THOU/MM3 (ref 0–0.1)
BASOPHILS NFR BLD AUTO: 0.2 %
BUN SERPL-MCNC: 15 MG/DL (ref 7–22)
CALCIUM SERPL-MCNC: 8.6 MG/DL (ref 8.5–10.5)
CHLORIDE SERPL-SCNC: 98 MEQ/L (ref 98–111)
CO2 SERPL-SCNC: 26 MEQ/L (ref 23–33)
CREAT SERPL-MCNC: 1 MG/DL (ref 0.4–1.2)
DEPRECATED RDW RBC AUTO: 47.6 FL (ref 35–45)
EOSINOPHIL NFR BLD AUTO: 12.9 %
EOSINOPHILS ABSOLUTE: 0.7 THOU/MM3 (ref 0–0.4)
ERYTHROCYTE [DISTWIDTH] IN BLOOD BY AUTOMATED COUNT: 16.1 % (ref 11.5–14.5)
GFR SERPL CREATININE-BSD FRML MDRD: > 60 ML/MIN/1.73M2
GLUCOSE BLD STRIP.AUTO-MCNC: 135 MG/DL (ref 70–108)
GLUCOSE BLD STRIP.AUTO-MCNC: 149 MG/DL (ref 70–108)
GLUCOSE SERPL-MCNC: 153 MG/DL (ref 70–108)
HCT VFR BLD AUTO: 48 % (ref 37–47)
HEPARIN UNFRACTIONATED: < 0.04 U/ML (ref 0.3–0.7)
HGB BLD-MCNC: 15.3 GM/DL (ref 12–16)
IMM GRANULOCYTES # BLD AUTO: 0.01 THOU/MM3 (ref 0–0.07)
IMM GRANULOCYTES NFR BLD AUTO: 0.2 %
LYMPHOCYTES ABSOLUTE: 1.1 THOU/MM3 (ref 1–4.8)
LYMPHOCYTES NFR BLD AUTO: 19.2 %
MCH RBC QN AUTO: 26.6 PG (ref 26–33)
MCHC RBC AUTO-ENTMCNC: 31.9 GM/DL (ref 32.2–35.5)
MCV RBC AUTO: 83.3 FL (ref 81–99)
MONOCYTES ABSOLUTE: 0.7 THOU/MM3 (ref 0.4–1.3)
MONOCYTES NFR BLD AUTO: 13.3 %
NEUTROPHILS NFR BLD AUTO: 54.2 %
NRBC BLD AUTO-RTO: 0 /100 WBC
OSMOLALITY SERPL: 296 MOSMOL/KG (ref 275–295)
PLATELET # BLD AUTO: 238 THOU/MM3 (ref 130–400)
PMV BLD AUTO: 10.6 FL (ref 9.4–12.4)
POTASSIUM SERPL-SCNC: 4.3 MEQ/L (ref 3.5–5.2)
RBC # BLD AUTO: 5.76 MILL/MM3 (ref 4.2–5.4)
SEGMENTED NEUTROPHILS ABSOLUTE COUNT: 3 THOU/MM3 (ref 1.8–7.7)
SODIUM SERPL-SCNC: 134 MEQ/L (ref 135–145)
WBC # BLD AUTO: 5.6 THOU/MM3 (ref 4.8–10.8)

## 2023-01-26 PROCEDURE — 85025 COMPLETE CBC W/AUTO DIFF WBC: CPT

## 2023-01-26 PROCEDURE — 3700000000 HC ANESTHESIA ATTENDED CARE: Performed by: INTERNAL MEDICINE

## 2023-01-26 PROCEDURE — 6370000000 HC RX 637 (ALT 250 FOR IP): Performed by: STUDENT IN AN ORGANIZED HEALTH CARE EDUCATION/TRAINING PROGRAM

## 2023-01-26 PROCEDURE — 2580000003 HC RX 258: Performed by: PHYSICIAN ASSISTANT

## 2023-01-26 PROCEDURE — 85520 HEPARIN ASSAY: CPT

## 2023-01-26 PROCEDURE — 6360000002 HC RX W HCPCS: Performed by: STUDENT IN AN ORGANIZED HEALTH CARE EDUCATION/TRAINING PROGRAM

## 2023-01-26 PROCEDURE — 2580000003 HC RX 258: Performed by: INTERNAL MEDICINE

## 2023-01-26 PROCEDURE — 3700000001 HC ADD 15 MINUTES (ANESTHESIA): Performed by: INTERNAL MEDICINE

## 2023-01-26 PROCEDURE — 2500000003 HC RX 250 WO HCPCS: Performed by: NURSE ANESTHETIST, CERTIFIED REGISTERED

## 2023-01-26 PROCEDURE — 0DJ08ZZ INSPECTION OF UPPER INTESTINAL TRACT, VIA NATURAL OR ARTIFICIAL OPENING ENDOSCOPIC: ICD-10-PCS | Performed by: INTERNAL MEDICINE

## 2023-01-26 PROCEDURE — 97116 GAIT TRAINING THERAPY: CPT

## 2023-01-26 PROCEDURE — 97530 THERAPEUTIC ACTIVITIES: CPT

## 2023-01-26 PROCEDURE — 3609017100 HC EGD: Performed by: INTERNAL MEDICINE

## 2023-01-26 PROCEDURE — 6370000000 HC RX 637 (ALT 250 FOR IP): Performed by: PHYSICIAN ASSISTANT

## 2023-01-26 PROCEDURE — 99239 HOSP IP/OBS DSCHRG MGMT >30: CPT | Performed by: PHYSICIAN ASSISTANT

## 2023-01-26 PROCEDURE — 94640 AIRWAY INHALATION TREATMENT: CPT

## 2023-01-26 PROCEDURE — 36415 COLL VENOUS BLD VENIPUNCTURE: CPT

## 2023-01-26 PROCEDURE — 6360000002 HC RX W HCPCS: Performed by: NURSE ANESTHETIST, CERTIFIED REGISTERED

## 2023-01-26 PROCEDURE — 7100000010 HC PHASE II RECOVERY - FIRST 15 MIN: Performed by: INTERNAL MEDICINE

## 2023-01-26 PROCEDURE — 80048 BASIC METABOLIC PNL TOTAL CA: CPT

## 2023-01-26 PROCEDURE — 82948 REAGENT STRIP/BLOOD GLUCOSE: CPT

## 2023-01-26 RX ORDER — INSULIN GLARGINE 100 [IU]/ML
20 INJECTION, SOLUTION SUBCUTANEOUS NIGHTLY
Qty: 1 EACH | Refills: 3 | Status: SHIPPED
Start: 2023-01-26

## 2023-01-26 RX ORDER — SODIUM CHLORIDE 9 MG/ML
INJECTION, SOLUTION INTRAVENOUS CONTINUOUS
Status: DISCONTINUED | OUTPATIENT
Start: 2023-01-26 | End: 2023-01-26 | Stop reason: HOSPADM

## 2023-01-26 RX ORDER — BUMETANIDE 1 MG/1
1 TABLET ORAL 2 TIMES DAILY
Qty: 60 TABLET | Refills: 3 | Status: SHIPPED | OUTPATIENT
Start: 2023-01-26

## 2023-01-26 RX ORDER — LIDOCAINE HYDROCHLORIDE 20 MG/ML
INJECTION, SOLUTION EPIDURAL; INFILTRATION; INTRACAUDAL; PERINEURAL PRN
Status: DISCONTINUED | OUTPATIENT
Start: 2023-01-26 | End: 2023-01-26 | Stop reason: SDUPTHER

## 2023-01-26 RX ORDER — KETAMINE HCL IN NACL, ISO-OSM 100MG/10ML
SYRINGE (ML) INJECTION PRN
Status: DISCONTINUED | OUTPATIENT
Start: 2023-01-26 | End: 2023-01-26 | Stop reason: SDUPTHER

## 2023-01-26 RX ORDER — PROPOFOL 10 MG/ML
INJECTION, EMULSION INTRAVENOUS PRN
Status: DISCONTINUED | OUTPATIENT
Start: 2023-01-26 | End: 2023-01-26 | Stop reason: SDUPTHER

## 2023-01-26 RX ADMIN — BUMETANIDE 1 MG: 1 TABLET ORAL at 07:43

## 2023-01-26 RX ADMIN — ACETAMINOPHEN 650 MG: 325 TABLET ORAL at 12:08

## 2023-01-26 RX ADMIN — SODIUM CHLORIDE: 9 INJECTION, SOLUTION INTRAVENOUS at 10:07

## 2023-01-26 RX ADMIN — SPIRONOLACTONE 25 MG: 25 TABLET ORAL at 12:09

## 2023-01-26 RX ADMIN — COLCHICINE 0.6 MG: 0.6 TABLET, FILM COATED ORAL at 07:43

## 2023-01-26 RX ADMIN — Medication 25 MG: at 10:22

## 2023-01-26 RX ADMIN — PANTOPRAZOLE SODIUM 40 MG: 40 TABLET, DELAYED RELEASE ORAL at 06:15

## 2023-01-26 RX ADMIN — SODIUM CHLORIDE, PRESERVATIVE FREE 10 ML: 5 INJECTION INTRAVENOUS at 12:10

## 2023-01-26 RX ADMIN — LEVALBUTEROL HYDROCHLORIDE 1.25 MG: 1.25 SOLUTION RESPIRATORY (INHALATION) at 11:10

## 2023-01-26 RX ADMIN — AMLODIPINE BESYLATE 10 MG: 10 TABLET ORAL at 07:44

## 2023-01-26 RX ADMIN — METOPROLOL TARTRATE 25 MG: 25 TABLET, FILM COATED ORAL at 07:43

## 2023-01-26 RX ADMIN — POTASSIUM CHLORIDE 10 MEQ: 750 TABLET, EXTENDED RELEASE ORAL at 12:08

## 2023-01-26 RX ADMIN — PROPOFOL 80 MG: 10 INJECTION, EMULSION INTRAVENOUS at 10:22

## 2023-01-26 RX ADMIN — ALLOPURINOL 300 MG: 300 TABLET ORAL at 07:43

## 2023-01-26 RX ADMIN — LOSARTAN POTASSIUM 50 MG: 50 TABLET, FILM COATED ORAL at 07:43

## 2023-01-26 RX ADMIN — LIDOCAINE HYDROCHLORIDE 60 MG: 20 INJECTION, SOLUTION EPIDURAL; INFILTRATION; INTRACAUDAL; PERINEURAL at 10:22

## 2023-01-26 RX ADMIN — ASPIRIN 81 MG: 81 TABLET, CHEWABLE ORAL at 12:08

## 2023-01-26 ASSESSMENT — PAIN DESCRIPTION - LOCATION: LOCATION: HEAD

## 2023-01-26 ASSESSMENT — PAIN - FUNCTIONAL ASSESSMENT
PAIN_FUNCTIONAL_ASSESSMENT: 0-10
PAIN_FUNCTIONAL_ASSESSMENT: ACTIVITIES ARE NOT PREVENTED

## 2023-01-26 ASSESSMENT — PAIN SCALES - GENERAL: PAINLEVEL_OUTOF10: 7

## 2023-01-26 ASSESSMENT — ENCOUNTER SYMPTOMS: SHORTNESS OF BREATH: 1

## 2023-01-26 NOTE — OP NOTE
[address] [place] [address]                                OPERATIVE REPORT    PATIENT NAME: Kristen Wilcox                     :        1955  MED REC NO:   [id number]                           ROOM:  ACCOUNT NO:   [de-identified]                           ADMIT DATE: 2023  PROVIDER:     CHOCO Mendoza OF PROCEDURE:  2023    SURGEON:  Tracey Brenner MD    PROCEDURE:  Esophagogastroduodenoscopy. INDICATIONS:  A 69year-old pleasant female complains of difficulty  swallowing. The patient complains of bread and meat gets stuck in her  upper chest.  Sometimes they do not go down. Sometimes she is feeling  gurgling in the mouth. She is supposed to have EGD as outpatient and  she is admitted in the hospital and she is positive for COVID-19. She  is undergoing further evaluation. PAST MEDICAL HISTORY:  Arthritis, asthma, coronary artery disease,  myocardial infarction, morbid obesity, cerebral infarction. PAST SURGICAL HISTORY:  Colonoscopy, echocardiogram, EGD and dilation,  left carotid endarterectomy. MEDICATIONS:  Reviewed. PHYSICAL EXAMINATION:  VITAL SIGNS:  Temperature 97.9, pulse 92, respiratory 20, blood pressure  113/63, weight 359 pounds. GENERAL:  Morbidly obese female lying in bed, appears to be in no acute  distress. HEENT:  Normocephalic, atraumatic. Pupils are equal, round, react to  light. Anicteric sclerae. No erythema of oropharynx. NECK:  Supple. No JVD. No thyromegaly. CHEST:  No axillary or supraclavicular adenopathy. LUNGS:  Equal to expansion on inspection. [address] air entry bilaterally. HEART:  Regular. Normal S1 and S2. No S3.  ABDOMEN:  Soft, obese. Normoactive bowel sounds. ASA CLASSIFICATION:  As per Anesthesia. Please see Anesthesia note for  details. INSTRUMENT:  GIF-190 gastroscope. PHOTOGRAPHS:  Yes. BIOPSIES:  Yes.     BIOPSIES: No.    ESTIMATED BLOOD LOSS:  Less than 10 mL. CONSENT:  Procedure indications and complications including but not  limited to perforation, bleeding, infection, adverse reaction to  medicine, very slight chance of missing significant lesions discussed  with the patient, and the patient expressed her understanding, and a  written consent was obtained. DESCRIPTION OF THE PROCEDURE:  The patient was placed in the left  lateral decubitus position in the endo room #14. The patient was placed  on appropriate monitoring of vitals including blood pressure, heart rate  and pulse ox. Oropharynx was sprayed with Cetacaine spray, and bite  block was placed between maxilla and mandible. After  adequate total  intravenous anesthesia was given by Anesthesia, the GIF-190 gastroscope  was inserted into the oropharynx. In the oropharynx, scope was advanced  to the upper esophageal sphincter. There appeared to be GI diverticulum  posterior to the upper esophageal sphincter. I suspect she may have  moderate Zenker's diverticulum as shown in picture #A1. With gentle  negotiation and changing the position of scope, able to pass through the  diverticulum, pass through the esophagus, stomach, into second portion  of duodenum. On careful inspection and on withdrawal of the scope, the  duodenum looked normal as shown in picture #A5. Antrum of the stomach  looked normal as shown in picture #A4, body of the stomach looked normal  as shown in picture #A7. On retroflex, fundus looked normal as shown in  picture #A8. In the distal esophagus, irregular Z-line noted. The  patient had squamocolumnar junction at gastroesophageal junction noted  as shown in picture #A9. Mid esophagus looked normal as shown in  picture #A10. Again in the proximal esophagus, appears to be suspected  small to moderate diverticulum noted as shown in picture #A1. Air was  withdrawn as the scope was removed.   The patient tolerated the procedure  well without any immediate complications. Vitals including blood  pressure, heart rate and pulse ox were stable during the procedure and  after the procedure. The patient left in the room in stable condition. IMPRESSION:  Esophagogastroduodenoscopy to second portion of duodenum. No esophageal stricture noted. Suspect mild to moderate Zenker's  diverticulum which could be causing her dysphagia. RECOMMENDATIONS:  Soft diet. Aspiration precautions. Esophagogram for  further evaluation.         Alesha Perea M.D.    D: 01/26/2023 10:42:56       T: 01/26/2023 10:45:33     [id number]  Job#: [id number]     Doc#: [id number]    CC:  Sunny Gonsalez M.D.

## 2023-01-26 NOTE — ANESTHESIA PRE PROCEDURE
Department of Anesthesiology  Preprocedure Note       Name:  Angle Obrien   Age:  67 y.o.  :  1955                                          MRN:  [id number]         Date:  2023      Surgeon: Surgeon(s):  Kiet Valencia MD    Procedure: Procedure(s):  EGD DIAGNOSTIC ONLY    Medications prior to admission:   Prior to Admission medications    Medication Sig Start Date End Date Taking? Authorizing Provider   allopurinol (ZYLOPRIM) 300 MG tablet Take 300 mg by mouth daily   Yes Historical Provider, MD   amLODIPine (NORVASC) 5 MG tablet Take 10 mg by mouth daily    Yes Historical Provider, MD   naproxen (NAPROSYN) 375 MG tablet Take 1 tablet by mouth 2 times daily (with meals) 10/10/22   Echo Milan PA-C   cyclobenzaprine (FLEXERIL) 5 MG tablet TAKE 1 TABLET BY MOUTH UP TO THREE TIMES DAILY AS NEEDED FOR MUSCLE SPASMS 22   Historical Provider, MD   pantoprazole (PROTONIX) 40 MG tablet Take 1 tablet by mouth every morning (before breakfast) 22   PATRICIO Srinivasan - CNP   polyethylene glycol (MIRALAX) 17 GM/SCOOP powder 2 Day colonoscopy prep.  Mix one-half bottle with 64 oz clear liquids on Day 1 and drink per instructions.  Mix remaining one-half of bottle with 64 oz clear liquids on Day 2 and drink per instructions. 22   PATRICIO Srinivasan CNP   Sennosides (SENNA) 8.6 MG CAPS Take 15 tablets by mouth per instructions on Day 1.  Take 15 tablets by mouth per instructions on Day 2. 22   PATRICIO Srinivasan CNP   Omega-3 Fatty Acids (FISH OIL) 1000 MG CAPS Take 3,000 mg by mouth 3 times daily  Patient not taking: No sig reported    Historical Provider, MD   vitamin C (ASCORBIC ACID) 500 MG tablet Take 500 mg by mouth daily    Historical Provider, MD   VITAMIN A PO Take by mouth    Historical Provider, MD   cetirizine (ZYRTEC) 10 MG tablet Take 10 mg by mouth daily  Patient not taking: Reported on 2022    Historical Provider, MD   capsicum (ZOSTRIX) 0.075 % topical cream  Apply topically 3 times daily Apply topically 3 times daily. Patient not taking: No sig reported    Historical Provider, MD   spironolactone (ALDACTONE) 25 MG tablet Take 25 mg by mouth daily    Historical Provider, MD   losartan (COZAAR) 50 MG tablet Take 50 mg by mouth daily    Historical Provider, MD   potassium chloride (KLOR-CON) 10 MEQ extended release tablet Take 10 mEq by mouth daily    Historical Provider, MD   Semaglutide,0.25 or 0.5MG/DOS, 2 MG/1.5ML SOPN Inject into the skin    Historical Provider, MD   glimepiride (AMARYL) 2 MG tablet Take 2 mg by mouth 2 times daily    Historical Provider, MD   atorvastatin (LIPITOR) 40 MG tablet Take 40 mg by mouth daily  Patient not taking: Reported on 9/19/2022    Historical Provider, MD   colchicine (COLCRYS) 0.6 MG tablet Take 0.6 mg by mouth daily    Historical Provider, MD   insulin glargine (LANTUS) 100 UNIT/ML injection vial Inject 25 Units into the skin 2 times daily 12/18/20   Meche Alosa, DO   insulin lispro (HUMALOG) 100 UNIT/ML injection vial Inject 0-18 Units into the skin 3 times daily (with meals) 12/18/20   Meche Alosa, DO   insulin lispro (HUMALOG) 100 UNIT/ML injection vial Inject 0-9 Units into the skin nightly 12/18/20   Meche Alosa, DO   miconazole (MICOTIN) 2 % powder Apply topically 2 times daily.  12/18/20   Meche Alosa, DO   zinc sulfate (ZINCATE) 220 (50 Zn) MG capsule Take 1 capsule by mouth daily 12/18/20   Meche Alosa, DO   Cholecalciferol (VITAMIN D) 25 MCG TABS Take 1 tablet by mouth daily 12/18/20   Meche Alosa, DO   aspirin 81 MG chewable tablet Take 1 tablet by mouth daily  Patient not taking: No sig reported 6/1/17   PATRICIO Bradford - CNP   Ascorbic Acid (VITAMIN C) 250 MG tablet Take 250 mg by mouth daily    Historical Provider, MD   atenolol (TENORMIN) 100 MG tablet Take 100 mg by mouth Take 1 tab daily    Historical Provider, MD       Current medications:    Current Facility-Administered Medications Medication Dose Route Frequency Provider Last Rate Last Admin    0.9 % sodium chloride infusion   IntraVENous Continuous Miles Prieto MD 75 mL/hr at 01/26/23 1007 Restarted at 01/26/23 1010    bumetanide (BUMEX) tablet 1 mg  1 mg Oral BID Alexei Gresham PA-C   1 mg at 01/26/23 0743    levalbuterol (XOPENEX) nebulizer solution 1.25 mg  1.25 mg Nebulization Q8H PRN Garret Blight, DO   1.25 mg at 01/25/23 1806    metoprolol tartrate (LOPRESSOR) tablet 25 mg  25 mg Oral BID Cisco Oksana, DO   25 mg at 01/26/23 0743    sodium chloride flush 0.9 % injection 5-40 mL  5-40 mL IntraVENous 2 times per day Fernanda Hess PA-C   10 mL at 01/24/23 2030    sodium chloride flush 0.9 % injection 5-40 mL  5-40 mL IntraVENous PRN Andrew Ohlemacher, PA-C        0.9 % sodium chloride infusion   IntraVENous PRN Andrew OhlemacOFELIA paige        ondansetron (ZOFRAN-ODT) disintegrating tablet 4 mg  4 mg Oral Q8H PRN Andrew OhlemacOFELIA paige        Or    ondansetron (ZOFRAN) injection 4 mg  4 mg IntraVENous Q6H PRN Andrew OhlemacOFELIA paige        polyethylene glycol (GLYCOLAX) packet 17 g  17 g Oral Daily PRN Andrew OhlemacOFELIA paige        acetaminophen (TYLENOL) tablet 650 mg  650 mg Oral Q6H PRN Andrew OhlemacOFELIA paige        Or    acetaminophen (TYLENOL) suppository 650 mg  650 mg Rectal Q6H PRN Andrew OhlemacOFELIA paige        allopurinol (ZYLOPRIM) tablet 300 mg  300 mg Oral Daily Andrew OhlemacGILBERTO paigeC   300 mg at 01/26/23 0743    amLODIPine (NORVASC) tablet 10 mg  10 mg Oral Daily Andrew OhlemacDIMITRI paige-C   10 mg at 01/26/23 0744    aspirin chewable tablet 81 mg  81 mg Oral Daily Andrew OhlemacGILBERTO paigeC   81 mg at 01/25/23 0942    atorvastatin (LIPITOR) tablet 40 mg  40 mg Oral Nightly Andrew Ohlemacher, PA-C   40 mg at 01/25/23 2143    colchicine (COLCRYS) tablet 0.6 mg  0.6 mg Oral Daily Andrew Ohlemacher, PA-C   0.6 mg at 01/26/23 0743    losartan (COZAAR) tablet 50 mg  50 mg Oral Daily Katarzyna Perez GinettemacDIMITRI paige-C   50 mg at 01/26/23 0743    pantoprazole (PROTONIX) tablet 40 mg  40 mg Oral QAM AC Andrew Ohlemacher PA-C   40 mg at 01/26/23 0615    potassium chloride (KLOR-CON) extended release tablet 10 mEq  10 mEq Oral Daily Andrew Ohlemac, PA-C   10 mEq at 01/25/23 2441    spironolactone (ALDACTONE) tablet 25 mg  25 mg Oral Daily Andrew Ohlemac, PA-C   25 mg at 01/25/23 3483    insulin glargine (LANTUS) injection vial 20 Units  20 Units SubCUTAneous Nightly Andrew Ohlemac, PA-C   20 Units at 01/25/23 2146    glucose chewable tablet 16 g  4 tablet Oral PRN Andrew Ohlemac, PA-C        dextrose bolus 10% 125 mL  125 mL IntraVENous PRN Andrew OhlemacDIMITRI paige-C        Or    dextrose bolus 10% 250 mL  250 mL IntraVENous PRN Andrew Ohlemac, PA-C        glucagon (rDNA) injection 1 mg  1 mg SubCUTAneous PRN Andrew Ohlemac, PA-C        dextrose 10 % infusion   IntraVENous Continuous PRN Andrew OhlemacDIMITRI paige-PABLITO        insulin lispro (HUMALOG) injection vial 0-4 Units  0-4 Units SubCUTAneous TID WC Andrew OhlemacDIMITRI paige-C        insulin lispro (HUMALOG) injection vial 0-4 Units  0-4 Units SubCUTAneous Nightly Andrew Ohlemacher, PA-C        heparin (porcine) injection 4,000 Units  4,000 Units IntraVENous PRN Andrew OhlemacDIMITRI paige-C        heparin (porcine) injection 2,000 Units  2,000 Units IntraVENous PRN [facility], PA-C   2,000 Units at 01/24/23 1348    heparin 25,000 units in dextrose 5% 250 mL (premix) infusion  5-30 Units/kg/hr IntraVENous Continuous Andrew OhleOFELIA ceballos   Paused at 01/26/23 0200       Allergies: Allergies   Allergen Reactions    Lisinopril      Other reaction(s): cough    Codeine Nausea And Vomiting     Patient states it can make her vomit.       Vicodin [Hydrocodone-Acetaminophen] Nausea And Vomiting       Problem List:    Patient Active Problem List   Diagnosis Code    Cryptogenic stroke ([facility] I63.9    Spondylolisthesis of lumbar region M43.16    Degenerative lumbar spinal stenosis M48.061    Morbid obesity due to excess calories ([facility]) E66.01    History of CVA (cerebrovascular accident) Z86.73    Postoperative anemia due to acute blood loss D62    Hypertension I10    Diabetes mellitus ([facility] E11.9    Episodic confusion R41.0    Spinal stenosis, lumbar region, without neurogenic claudication M48.061    Diabetes mellitus type 2 in obese ([facility]) E11.69, E66.9    Shortness of breath R06.02    Acute CVA (cerebrovascular accident) ([facility] I63.9    Paresthesia R20.2    Bilateral leg edema +1 chronic R60.0    SOB (shortness of breath) on exertion R06.02    Claudication in peripheral vascular disease ([facility]) I73.9    Numbness R20.0    PAD (peripheral artery disease) ([facility]) I73.9    S/P angioplasty with Balloon of the RT SFA now 2019 Z98.62    Pure hypercholesterolemia E78.00    COVID-19 U07.1    Abnormal EKG R94.31    Mild aortic stenosis I35.0    Atrial fibrillation with rapid ventricular response ([facility]) I48.91    Fall W19. Jac Wallace       Past Medical History:        Diagnosis Date    Arthritis     Asthma     CAD (coronary artery disease)     Degenerative lumbar spinal stenosis 01/20/2015    [facility]     GERD (gastroesophageal reflux disease)     Gout     History of MI (myocardial infarction) 2004    History of stroke     Hyperlipidemia     Hypertension     Morbid obesity with body mass index of 50.0-59.9 in [facility])     Spondylolisthesis of lumbar region 01/20/2015    Bilateral L5 pars defects.     Unspecified cerebral artery occlusion with cerebral infarction 2010       Past Surgical History:        Procedure Laterality Date    BACK SURGERY      COLONOSCOPY      COLONOSCOPY N/A 10/5/2022    COLONOSCOPY performed by Cong Hoskins MD at [facility]    ENDOSCOPY, COLON, DIAGNOSTIC      JOINT REPLACEMENT Bilateral     knees    KNEE SURGERY  2006    x2    HI ECHO TRANSESOPHAG R-T 2D IMG ACQUISJ I&R ONLY Left 6/8/2018    TRANSESOPHAGEAL ECHOCARDIOGRAM performed by Michael Avina MD at [address] Right 06/2019    x3    UPPER GASTROINTESTINAL ENDOSCOPY Left 1/25/2023    EGD ESOPHAGOGASTRODUODENOSCOPYWITH POSS BIOPSY AND/OR DILATION performed by Ankur Paredes MD at [facility]      left carotid       Social History:    Social History     Tobacco Use    Smoking status: Never    Smokeless tobacco: Never   Substance Use Topics    Alcohol use: Never                                Counseling given: Not Answered      Vital Signs (Current):   Vitals:    01/26/23 0739 01/26/23 0958 01/26/23 1035 01/26/23 1043   BP: 115/77 113/63  (!) 158/92   Pulse: 82 98 92    Resp: 20 16 20    Temp: 36.6 °C (97.9 °F)      TempSrc: Oral      SpO2: 95% 95% 100%    Weight:       Height:                                                  BP Readings from Last 3 Encounters:   01/26/23 (!) 158/92   10/10/22 (!) 141/97   10/05/22 128/60       NPO Status: Time of last liquid consumption: 0800                        Time of last solid consumption: 2000                        Date of last liquid consumption: 01/26/23                        Date of last solid food consumption: 01/25/23    BMI:   Wt Readings from Last 3 Encounters:   01/26/23 (!) 359 lb (162.8 kg)   10/10/22 (!) 325 lb (147.4 kg)   10/05/22 (!) 362 lb 9.6 oz (164.5 kg)     Body mass index is 54.59 kg/m².     CBC:   Lab Results   Component Value Date/Time    WBC 5.6 01/26/2023 05:41 AM    RBC 5.76 01/26/2023 05:41 AM    HGB 15.3 01/26/2023 05:41 AM    HCT 48.0 01/26/2023 05:41 AM    MCV 83.3 01/26/2023 05:41 AM    RDW 15.7 09/13/2022 02:31 PM     01/26/2023 05:41 AM       CMP:   Lab Results   Component Value Date/Time     01/26/2023 05:41 AM    K 4.3 01/26/2023 05:41 AM    CL 98 01/26/2023 05:41 AM    CO2 26 01/26/2023 05:41 AM    BUN 15 01/26/2023 05:41 AM    CREATININE 1.0 01/26/2023 05:41 AM GFRAA >60 09/13/2022 02:31 PM    LABGLOM >60 01/26/2023 05:41 AM    GLUCOSE 153 01/26/2023 05:41 AM    PROT 6.8 01/23/2023 02:47 PM    CALCIUM 8.6 01/26/2023 05:41 AM    BILITOT 0.7 01/23/2023 02:47 PM    ALKPHOS 63 01/23/2023 02:47 PM    AST 16 01/23/2023 02:47 PM    ALT 13 01/23/2023 02:47 PM       POC Tests:   Recent Labs     01/26/23  0651   POCGLU 149*       Coags:   Lab Results   Component Value Date/Time    PROTIME 1.53 10/16/2011 03:09 PM    INR 1.26 01/23/2023 06:23 PM    APTT 31.4 01/23/2023 06:23 PM       HCG (If Applicable): No results found for: PREGTESTUR, PREGSERUM, HCG, HCGQUANT     ABGs: No results found for: PHART, PO2ART, XBL7NLW, OYO7KYI, BEART, [id number]     Type & Screen (If Applicable):  Lab Results   Component Value Date    LABRH POS 11/05/2019       Drug/Infectious Status (If Applicable):  No results found for: HIV, HEPCAB    COVID-19 Screening (If Applicable):   Lab Results   Component Value Date/Time    COVID19 DETECTED 01/23/2023 02:16 PM           Anesthesia Evaluation  Patient summary reviewed and Nursing notes reviewed no history of anesthetic complications:   Airway: Mallampati: III  TM distance: >3 FB   Neck ROM: full  Mouth opening: > = 3 FB   Dental:    (+) edentulous      Pulmonary:   (+) shortness of breath:  asthma:                           ROS comment: Positive for covid   Cardiovascular:    (+) hypertension:, CAD:, ALEXANDER:,                   Neuro/Psych:   (+) CVA:,             GI/Hepatic/Renal:   (+) GERD:,           Endo/Other:    (+) Diabetes, . Abdominal:             Vascular: negative vascular ROS. Other Findings:           Anesthesia Plan      MAC     ASA 3       Induction: intravenous. Anesthetic plan and risks discussed with patient. Plan discussed with CRNA.     Attending anesthesiologist reviewed and agrees with Preprocedure content                PATRICIO Celeste - DWAIN   1/26/2023

## 2023-01-26 NOTE — CARE COORDINATION
1/26/23, 12:23 PM EST    Patient goals/plan/ treatment preferences discussed by  and . Patient goals/plan/ treatment preferences reviewed with patient/ family. Patient/ family verbalize understanding of discharge plan and are in agreement with goal/plan/treatment preferences. Understanding was demonstrated using the teach back method. AVS provided by RN at time of discharge, which includes all necessary medical information pertaining to the patients current course of illness, treatment, post-discharge goals of care, and treatment preferences. Services At/After Discharge: Home Health, Nursing service, OT, and PT       IMM Letter  IMM Letter given to Patient/Family/Significant other/Guardian/POA/by[de-identified] Brian Galindo CM  IMM Letter date given[de-identified] 01/26/23  IMM Letter time given[de-identified] 65     Pt is being discharged home today with spouse and son. Spouse to  pt at 5:30 pm.  SW completed referral with Kiet Ba[place]uelos [place] per pt request.  Niru Hernández updated.

## 2023-01-26 NOTE — PROGRESS NOTES
[facility] 58  OCCUPATIONAL THERAPY MISSED TREATMENT NOTE  [facility]LE[facility] 6K  6K-008-A      Date: 2023  Patient Name: Brian Naranjo        CSN: [id number]   : 1955  (69 y.o.)  Gender: female   Referring Practitioner: Marina Knapp PA-C  Diagnosis: General Weakness         REASON FOR MISSED TREATMENT:  first attempt to tx patient at 1007 with patient off floor at [facility]. Second attempt to tx patient in p.m. with patient supine in bed and stated she already completed therapy. When edu on difference b/t PT and OT; patient declined stating she was being discharged soon.  When offered to assist with getting changed into her clothes, patient declined since her son isn't coming until 3pm.

## 2023-01-26 NOTE — PROGRESS NOTES
[address] [address]  INPATIENT PHYSICAL THERAPY  DAILY NOTE  [facility]LE[facility] 6K - 6K-08/008-A    Time In: 1137  Time Out: 1201  Timed Code Treatment Minutes: 24 Minutes  Minutes: 24          Date: 2023  Patient Name: Kelly Waller,  Gender:  female        MRN: [id number]  : 1955  (69 y.o.)     Referring Practitioner: Alton Guillen PA-C  Diagnosis: general weakness  Additional Pertinent Hx: Per H&P : Kelly Waller is a 69 y.o. female with a history of hypertension, HFpEF, hyperlipidemia, coronary artery disease, peripheral artery disease, insulin-dependent diabetes mellitus, gastroesophageal reflux disease, and history of gastric metaplasia who presented to [facility] with chief complaint of fall and shortness of breath. The patient states this morning she was attempting to sit on the commode and states she missed and fell to the ground. She was brought to the emergency department for further evaluation. On arrival, she was noted to be in atrial fibrillation with rapid ventricular response. She has no history of atrial fibrillation prior to this and had a loop recorder placed previously which did not reveal any evidence of atrial fibrillation. Incidentally, the patient tested positive for COVID-19. Her only symptom regarding her COVID-19 diagnosis is cough.  Pt pressents with new A-Fib and COVID     Prior Level of Function:  Lives With: Spouse, Son  Type of Home: House  Home Layout: One level  Home Access: Ramped entrance  Home Equipment: Walker, rolling   Bathroom Shower/Tub: Walk-in shower  Bathroom Toilet: Standard  Bathroom Equipment: Commode  Bathroom Accessibility: Accessible    Receives Help From: Family  ADL Assistance: Independent  Homemaking Assistance: Independent  Homemaking Responsibilities: Yes  Ambulation Assistance: Independent  Transfer Assistance: Needs assistance  Active : Yes  IADL Comments: Pt reporting indep with ADLs and IADLs; however, reports her  assists her with mobility. Anticipate pt needs increased A with ADLs and IADLs. Additional Comments: Pt states she \"can't use\" her RW. She has assist from her  for mobility, but has noticed increased weakness. She states her  is home and able to assist her. Restrictions/Precautions:  Restrictions/Precautions: General Precautions, Fall Risk, Isolation  Position Activity Restriction  Other position/activity restrictions: COVID, monitor HR     SUBJECTIVE: pt required max encouragement to participate    PAIN: RLE pain-chronic, c/o HA-RN aware    Vitals: Oxygen: on room air, O2 sat 95-97%    OBJECTIVE:  Bed Mobility:  Rolling to Left: Contact Guard Assistance   Supine to Sit: Minimal Assistance, assist at RLE  Sit to Supine: Stand By Assistance   Scooting: Stand By Assistance  HOB Up 20 degrees  Transfers:  Sit to Stand: Contact Guard Assistance  Stand to [facility], cues for hand placement  Ambulation:  Contact Guard Assistance, to SBA  Distance: 12'x2  Surface: Level Tile  Device:Rolling Walker  Gait Deviations: Forward Flexed Posture, Slow Rosa, Decreased Step Length Bilaterally, Wide Base of Support, and Mild Path Deviations, fatigued quickly    Balance:  Static Sitting Balance:  Supervision  Static Standing Balance: Contact Guard Assistance, to SBA, WBOS with RW        Functional Outcome Measures: Completed  AM-PAC Inpatient Mobility without Stair Climbing Raw Score : 15  AM-PAC Inpatient without Stair Climbing T-Scale Score : 43.03    ASSESSMENT:  Assessment: Patient progressing toward established goals. Pt with dec endurance/strength/balance, use of walker with CGA to sBA for safe mobility. Activity Tolerance:  Patient tolerance of  treatment: fair.       Equipment Recommendations:Equipment Needed: No (depending on how her mobility progresses, she may require a wheelchair)  Discharge Recommendations: Continue to assess pending progress and Patient would benefit from continued PT at discharge  Plan: Current Treatment Recommendations: Strengthening, Balance training, Functional mobility training, Transfer training, Endurance training, Neuromuscular re-education, Home exercise program, Safety education & training, Patient/Caregiver education & training, Equipment evaluation, education, & procurement, Therapeutic activities, Gait training  General Plan:  (5x C)    Patient Education  Patient Education: Bed Mobility, Transfers, Gait    Goals:  Patient Goals : none stated  Short Term Goals  Time Frame for Short Term Goals: by hospital d/c  Short Term Goal 1: Pt to complete supine <->sit with S for ease getting out of bed  Short Term Goal 2: Pt to demo seated balance edge of bed with S for >=10 minutes with no loss of stability to progress her safe mobility  Short Term Goal 3: Pt to complete x10 B LE exercises with SBA and verbal cues for improved strength  Short Term Goal 4: transfer with S to get in/out of chairs  Short Term Goal 5: amb >50'x1 with RW and S to walk safely in home  Long Term Goals  Time Frame for Long Term Goals : NA due to short ELOS    Following session, patient left in safe position with all fall risk precautions in place.

## 2023-01-26 NOTE — ANESTHESIA PRE PROCEDURE
Department of Anesthesiology  Preprocedure Note       Name:  Alexandria Rojo   Age:  69 y.o.  :  1955                                          MRN:  [id number]         Date:  2023      Surgeon: Shaun Valencia):  Ankur Paredes MD    Procedure: Procedure(s):  EGD [facility] POSS BIOPSY AND/OR DILATION    Medications prior to admission:   Prior to Admission medications    Medication Sig Start Date End Date Taking? Authorizing Provider   allopurinol (ZYLOPRIM) 300 MG tablet Take 300 mg by mouth daily   Yes Historical Provider, MD   amLODIPine (NORVASC) 5 MG tablet Take 10 mg by mouth daily    Yes Historical Provider, MD   naproxen (NAPROSYN) 375 MG tablet Take 1 tablet by mouth 2 times daily (with meals) 10/10/22   Elly Houston PA-C   cyclobenzaprine (FLEXERIL) 5 MG tablet TAKE 1 TABLET BY MOUTH UP TO THREE TIMES DAILY AS NEEDED FOR MUSCLE SPASMS 22   Historical Provider, MD   pantoprazole (PROTONIX) 40 MG tablet Take 1 tablet by mouth every morning (before breakfast) 22   PATRICIO Wayne CNP   polyethylene glycol (MIRALAX) 17 GM/SCOOP powder 2 Day colonoscopy prep. Mix one-half bottle with 64 oz clear liquids on Day 1 and drink per instructions. Mix remaining one-half of bottle with 64 oz clear liquids on Day 2 and drink per instructions. 22   PATRICIO Wayne CNP   Sennosides (SENNA) 8.6 MG CAPS Take 15 tablets by mouth per instructions on Day 1.   Take 15 tablets by mouth per instructions on Day 2. 22   PATRICIO Wayne CNP   Omega-3 Fatty Acids (FISH OIL) 1000 MG CAPS Take 3,000 mg by mouth 3 times daily  Patient not taking: No sig reported    Historical Provider, MD   vitamin C (ASCORBIC ACID) 500 MG tablet Take 500 mg by mouth daily    Historical Provider, MD   VITAMIN A PO Take by mouth    Historical Provider, MD   cetirizine (ZYRTEC) 10 MG tablet Take 10 mg by mouth daily  Patient not taking: Reported on 2022    Historical Provider, MD capsicum (ZOSTRIX) 0.075 % topical cream Apply topically 3 times daily Apply topically 3 times daily. Patient not taking: No sig reported    Historical Provider, MD   spironolactone (ALDACTONE) 25 MG tablet Take 25 mg by mouth daily    Historical Provider, MD   losartan (COZAAR) 50 MG tablet Take 50 mg by mouth daily    Historical Provider, MD   potassium chloride (KLOR-CON) 10 MEQ extended release tablet Take 10 mEq by mouth daily    Historical Provider, MD   Semaglutide,0.25 or 0.5MG/DOS, 2 MG/1.5ML SOPN Inject into the skin    Historical Provider, MD   glimepiride (AMARYL) 2 MG tablet Take 2 mg by mouth 2 times daily    Historical Provider, MD   atorvastatin (LIPITOR) 40 MG tablet Take 40 mg by mouth daily  Patient not taking: Reported on 9/19/2022    Historical Provider, MD   colchicine (COLCRYS) 0.6 MG tablet Take 0.6 mg by mouth daily    Historical Provider, MD   insulin glargine (LANTUS) 100 UNIT/ML injection vial Inject 25 Units into the skin 2 times daily 12/18/20   Shaun Rinks, DO   insulin lispro (HUMALOG) 100 UNIT/ML injection vial Inject 0-18 Units into the skin 3 times daily (with meals) 12/18/20   Shaun Rinks, DO   insulin lispro (HUMALOG) 100 UNIT/ML injection vial Inject 0-9 Units into the skin nightly 12/18/20   Shaun Rinks, DO   miconazole (MICOTIN) 2 % powder Apply topically 2 times daily.  12/18/20   Shaun Rinks, DO   zinc sulfate (ZINCATE) 220 (50 Zn) MG capsule Take 1 capsule by mouth daily 12/18/20   Shaun Rinks, DO   Cholecalciferol (VITAMIN D) 25 MCG TABS Take 1 tablet by mouth daily 12/18/20   Shaun Rinks, DO   aspirin 81 MG chewable tablet Take 1 tablet by mouth daily  Patient not taking: No sig reported 6/1/17   PATRICIO Talbot - CNP   Ascorbic Acid (VITAMIN C) 250 MG tablet Take 250 mg by mouth daily    Historical Provider, MD   atenolol (TENORMIN) 100 MG tablet Take 100 mg by mouth Take 1 tab daily    Historical Provider, MD       Current medications: Current Facility-Administered Medications   Medication Dose Route Frequency Provider Last Rate Last Admin    0.9 % sodium chloride infusion   IntraVENous Continuous Tracey Brenner MD 75 mL/hr at 01/26/23 1007 New Bag at 01/26/23 1007    bumetanide (BUMEX) tablet 1 mg  1 mg Oral BID Alonzo Rosa PA-C   1 mg at 01/26/23 0743    levalbuterol (XOPENEX) nebulizer solution 1.25 mg  1.25 mg Nebulization Q8H PRN Viktor Neighbours, DO   1.25 mg at 01/25/23 1806    metoprolol tartrate (LOPRESSOR) tablet 25 mg  25 mg Oral BID Cisco Oksana, DO   25 mg at 01/26/23 0743    sodium chloride flush 0.9 % injection 5-40 mL  5-40 mL IntraVENous 2 times per day Arnulfo Khan PA-C   10 mL at 01/24/23 2030    sodium chloride flush 0.9 % injection 5-40 mL  5-40 mL IntraVENous PRN Andrew OhOFELIA christopher        0.9 % sodium chloride infusion   IntraVENous PRN Andrew OhlemacOFELIA paige        ondansetron (ZOFRAN-ODT) disintegrating tablet 4 mg  4 mg Oral Q8H PRN Andrew OFELIA Nugent        Or    ondansetron (ZOFRAN) injection 4 mg  4 mg IntraVENous Q6H PRN Andrew OhlemacDIMITRI paige-C        polyethylene glycol (GLYCOLAX) packet 17 g  17 g Oral Daily PRN Andrew OhOFELIA christopher        acetaminophen (TYLENOL) tablet 650 mg  650 mg Oral Q6H PRN Andrew OhlemacOFELIA paige        Or    acetaminophen (TYLENOL) suppository 650 mg  650 mg Rectal Q6H PRN Andrew OhlemacGILBERTO paigeC        allopurinol (ZYLOPRIM) tablet 300 mg  300 mg Oral Daily AndrewGILBERTO NealC   300 mg at 01/26/23 0743    amLODIPine (NORVASC) tablet 10 mg  10 mg Oral Daily Andrew OhlemacDIMITRI paige-C   10 mg at 01/26/23 0744    aspirin chewable tablet 81 mg  81 mg Oral Daily DIMITRI Pittman-C   81 mg at 01/25/23 0942    atorvastatin (LIPITOR) tablet 40 mg  40 mg Oral Nightly Andrew Ohlemacher, PA-C   40 mg at 01/25/23 2143    colchicine (COLCRYS) tablet 0.6 mg  0.6 mg Oral Daily Andrew Ohlemacher, PA-C   0.6 mg at 01/26/23 0743    losartan (COZAAR) tablet 50 mg  50 mg Oral Daily Andrew Ohlemacher, PA-C   50 mg at 01/26/23 0743    pantoprazole (PROTONIX) tablet 40 mg  40 mg Oral QAM AC Andrew Ohlemacher, PA-C   40 mg at 01/26/23 0615    potassium chloride (KLOR-CON) extended release tablet 10 mEq  10 mEq Oral Daily Andrew Ohlemacher, PA-C   10 mEq at 01/25/23 6785    spironolactone (ALDACTONE) tablet 25 mg  25 mg Oral Daily Karma Drain, PA-C   25 mg at 01/25/23 5938    insulin glargine (LANTUS) injection vial 20 Units  20 Units SubCUTAneous Nightly Andrew Ohlemacher, PA-C   20 Units at 01/25/23 2146    glucose chewable tablet 16 g  4 tablet Oral PRN Andrew Ohlemac, PA-C        dextrose bolus 10% 125 mL  125 mL IntraVENous PRN Andrew Ohlemacher, PA-C        Or    dextrose bolus 10% 250 mL  250 mL IntraVENous PRN Andrew Ohlemacher, PA-C        glucagon (rDNA) injection 1 mg  1 mg SubCUTAneous PRN Andrew Ohlemacher, PA-C        dextrose 10 % infusion   IntraVENous Continuous PRN Andrew Ohlemacher, PA-C        insulin lispro (HUMALOG) injection vial 0-4 Units  0-4 Units SubCUTAneous TID WC Andrew Ohlemacher, PA-C        insulin lispro (HUMALOG) injection vial 0-4 Units  0-4 Units SubCUTAneous Nightly Andrew Ohlemacher, PA-C        heparin (porcine) injection 4,000 Units  4,000 Units IntraVENous PRN Andrew Ohlemacher, PA-C        heparin (porcine) injection 2,000 Units  2,000 Units IntraVENous PRN Karma Drain, PA-C   2,000 Units at 01/24/23 1348    heparin 25,000 units in dextrose 5% 250 mL (premix) infusion  5-30 Units/kg/hr IntraVENous Continuous Andrew OhlemacDIMITRI paige-C   Paused at 01/26/23 0200       Allergies: Allergies   Allergen Reactions    Lisinopril      Other reaction(s): cough    Codeine Nausea And Vomiting     Patient states it can make her vomit.       Vicodin [Hydrocodone-Acetaminophen] Nausea And Vomiting       Problem List:    Patient Active Problem List   Diagnosis Code    Cryptogenic stroke ([facility] [address] I63.9    Spondylolisthesis of lumbar region M43.16    Degenerative lumbar spinal stenosis M48.061    Morbid obesity due to excess calories ([facility]) E66.01    History of CVA (cerebrovascular accident) Z86.73    Postoperative anemia due to acute blood loss D62    Hypertension I10    Diabetes mellitus ([facility] [address] E11.9    Episodic confusion R41.0    Spinal stenosis, lumbar region, without neurogenic claudication M48.061    Diabetes mellitus type 2 in obese ([facility]) E11.69, E66.9    Shortness of breath R06.02    Acute CVA (cerebrovascular accident) ([facility] [address] I63.9    Paresthesia R20.2    Bilateral leg edema +1 chronic R60.0    SOB (shortness of breath) on exertion R06.02    Claudication in peripheral vascular disease ([facility]) I73.9    Numbness R20.0    PAD (peripheral artery disease) ([facility]) I73.9    S/P angioplasty with Balloon of the RT SFA now 2019 Z98.62    Pure hypercholesterolemia E78.00    COVID-19 U07.1    Abnormal EKG R94.31    Mild aortic stenosis I35.0    Atrial fibrillation with rapid ventricular response ([facility]) I48.91    Fall W19. Jac Wallace       Past Medical History:        Diagnosis Date    Arthritis     Asthma     CAD (coronary artery disease)     Degenerative lumbar spinal stenosis 01/20/2015    Diabetes mellitus ([facility] [address]     GERD (gastroesophageal reflux disease)     Gout     History of MI (myocardial infarction) 2004    History of stroke     Hyperlipidemia     Hypertension     Morbid obesity with body mass index of 50.0-59.9 in adult [facility])     Spondylolisthesis of lumbar region 01/20/2015    Bilateral L5 pars defects.     Unspecified cerebral artery occlusion with cerebral infarction 2010       Past Surgical History:        Procedure Laterality Date    BACK SURGERY      COLONOSCOPY      COLONOSCOPY N/A 10/5/2022    [facility]    ENDOSCOPY, COLON, DIAGNOSTIC      JOINT REPLACEMENT Bilateral     knees    KNEE SURGERY  2006    x2    [place] ECHO TRANSESOPHAG R-T 2D IMG ACQUISJ I&R ONLY Left 6/8/2018    TRANSESOPHAGEAL ECHOCARDIOGRAM performed by Yvonne Lovell MD at [address] Right 06/2019    x3    UPPER GASTROINTESTINAL ENDOSCOPY Left 1/25/2023    EGD ESOPHAGOGASTRODUODENOSCOPYWITH POSS BIOPSY AND/OR DILATION performed by Tracey Brenner MD at [facility]      left carotid       Social History:    Social History     Tobacco Use    Smoking status: Never    Smokeless tobacco: Never   Substance Use Topics    Alcohol use: Never                                Counseling given: Not Answered      Vital Signs (Current):   Vitals:    01/26/23 0333 01/26/23 0556 01/26/23 0739 01/26/23 0958   BP: 121/83  115/77 113/63   Pulse: 91  82 98   Resp: 22 20 16   Temp: 36.6 °C (97.9 °F)  36.6 °C (97.9 °F)    TempSrc: Oral  Oral    SpO2: 95%  95% 95%   Weight: (!) 374 lb (169.6 kg) (!) 359 lb (162.8 kg)     Height:                                                  BP Readings from Last 3 Encounters:   01/26/23 113/63   10/10/22 (!) 141/97   10/05/22 128/60       NPO Status: Time of last liquid consumption: 0800                        Time of last solid consumption: 2000                        Date of last liquid consumption: 01/26/23                        Date of last solid food consumption: 01/25/23    BMI:   Wt Readings from Last 3 Encounters:   01/26/23 (!) 359 lb (162.8 kg)   10/10/22 (!) 325 lb (147.4 kg)   10/05/22 (!) 362 lb 9.6 oz (164.5 kg)     Body mass index is 54.59 kg/m².     CBC:   Lab Results   Component Value Date/Time    WBC 5.6 01/26/2023 05:41 AM    RBC 5.76 01/26/2023 05:41 AM    HGB 15.3 01/26/2023 05:41 AM    HCT 48.0 01/26/2023 05:41 AM    MCV 83.3 01/26/2023 05:41 AM    RDW 15.7 09/13/2022 02:31 PM     01/26/2023 05:41 AM       CMP:   Lab Results   Component Value Date/Time     01/26/2023 05:41 AM    K 4.3 01/26/2023 05:41 AM    CL 98 01/26/2023 05:41 AM    CO2 26 01/26/2023 05:41 AM    BUN 15 01/26/2023 05:41 AM    CREATININE 1.0 01/26/2023 05:41 AM    GFRAA >60 09/13/2022 02:31 PM    LABGLOM >60 01/26/2023 05:41 AM    GLUCOSE 153 01/26/2023 05:41 AM    PROT 6.8 01/23/2023 02:47 PM    CALCIUM 8.6 01/26/2023 05:41 AM    BILITOT 0.7 01/23/2023 02:47 PM    ALKPHOS 63 01/23/2023 02:47 PM    AST 16 01/23/2023 02:47 PM    ALT 13 01/23/2023 02:47 PM       POC Tests:   Recent Labs     01/26/23  0651   POCGLU 149*       Coags:   Lab Results   Component Value Date/Time    PROTIME 1.53 10/16/2011 03:09 PM    INR 1.26 01/23/2023 06:23 PM    APTT 31.4 01/23/2023 06:23 PM       HCG (If Applicable): No results found for: PREGTESTUR, PREGSERUM, HCG, HCGQUANT     ABGs: No results found for: PHART, PO2ART, RJE4MJR, IJV8RSA, BEART, F2KMEZZY     Type & Screen (If Applicable):  Lab Results   Component Value Date    LABRH POS 11/05/2019       Drug/Infectious Status (If Applicable):  No results found for: HIV, HEPCAB    COVID-19 Screening (If Applicable):   Lab Results   Component Value Date/Time    COVID19 DETECTED 01/23/2023 02:16 PM           Anesthesia Evaluation  Patient summary reviewed and Nursing notes reviewed no history of anesthetic complications:   Airway:           Dental:          Pulmonary:   (+) shortness of breath:  asthma:                            Cardiovascular:    (+) hypertension:, past MI:, CAD:, ALEXANDER:,                   Neuro/Psych:   (+) CVA:,             GI/Hepatic/Renal:   (+) GERD:,           Endo/Other:    (+) DiabetesType I DM, poorly controlled, using insulin, . Abdominal:             Vascular: negative vascular ROS.          Other Findings:           Anesthesia Plan        PATRICIO Almanza - CRNA   1/26/2023

## 2023-01-26 NOTE — ANESTHESIA POSTPROCEDURE EVALUATION
Department of Anesthesiology  Postprocedure Note    Patient: Trish Felix  MRN: [id number]  YOB: 1955  Date of evaluation: 1/26/2023      Procedure Summary     Date: 01/26/23 Room / Location: [facility] / [address] [place] [address]    Anesthesia Start: 1010 Anesthesia Stop: 8866    Procedure: EGD DIAGNOSTIC ONLY (Left) Diagnosis:       Dysphagia, unspecified type      (Dysphagia, unspecified type [R13.10])    Surgeons: Mary Anne Camacho MD Responsible Provider: Rakesh Krishna DO    Anesthesia Type: MAC ASA Status: 3          Anesthesia Type: No value filed.     Paul Phase I: Paul Score: 8    Paul Phase II: Paul Score: 9      Anesthesia Post Evaluation    Patient location during evaluation: bedside  Patient participation: complete - patient participated  Level of consciousness: awake and alert  Airway patency: patent  Nausea & Vomiting: no nausea and no vomiting  Complications: no  Cardiovascular status: hemodynamically stable  Respiratory status: spontaneous ventilation, acceptable and nasal cannula  Hydration status: euvolemic  Comments: Patient coughing up lots of sputum

## 2023-01-26 NOTE — BRIEF OP NOTE
Brief Postoperative Note      Patient: Sp Ames  YOB: 1955  MRN: [id number]    Date of Procedure: 1/26/2023    Pre-Op Diagnosis: Dysphagia, unspecified type [R13.10]    Post-Op Diagnosis:  moderate suspected zenker's diverticulum, rest of egd normal       Procedure(s):  EGD DIAGNOSTIC ONLY    Surgeon(s):  Anita Rodriguez MD    Assistant:  * No surgical staff found *    Anesthesia: Monitor Anesthesia Care    Estimated Blood Loss (mL): Minimal    Complications: None    Specimens:   * No specimens in log *    Implants:  * No implants in log *      Drains: * No LDAs found *    Findings: moderate suspected zenker's diverticulum, rest of egd normal    Electronically signed by Anita Rodriguez MD on 1/26/2023 at 10:42 AM

## 2023-01-26 NOTE — PLAN OF CARE
Problem: Discharge Planning  Goal: Discharge to home or other facility with appropriate resources  Outcome: Progressing  Flowsheets (Taken 1/25/2023 2052)  Discharge to home or other facility with appropriate resources: Identify barriers to discharge with patient and caregiver     Problem: Skin/Tissue Integrity  Goal: Absence of new skin breakdown  Description: 1. Monitor for areas of redness and/or skin breakdown  2. Assess vascular access sites hourly  3. Every 4-6 hours minimum:  Change oxygen saturation probe site  4. Every 4-6 hours:  If on nasal continuous positive airway pressure, respiratory therapy assess nares and determine need for appliance change or resting period. Outcome: Progressing     Problem: Safety - Adult  Goal: Free from fall injury  Outcome: Progressing  Goal: Isolation precautions  Description: Isolation precautions  Outcome: Progressing     Problem: Chronic Conditions and Co-morbidities  Goal: Patient's chronic conditions and co-morbidity symptoms are monitored and maintained or improved  Outcome: Progressing  Flowsheets (Taken 1/25/2023 2052)  Care Plan - Patient's Chronic Conditions and Co-Morbidity Symptoms are Monitored and Maintained or Improved: Monitor and assess patient's chronic conditions and comorbid symptoms for stability, deterioration, or improvement     Problem: Respiratory - Adult  Goal: Clear lung sounds  1/26/2023 0247 by Kayce Veronica RN  Outcome: Progressing  1/25/2023 1809 by Franco Mantilla RCP  Outcome: Progressing   Careplan reviewed with pt.  Pt verbalized understanding and is agreeable to careplan

## 2023-01-26 NOTE — PROGRESS NOTES
Recovery mode. Denies discomfort, passing gas, taking fluids.   discussed findings and plan of care with patient. Nurse called report to floor nurse.

## 2023-01-27 NOTE — DISCHARGE SUMMARY
Hospital Medicine Discharge Summary      Patient Identification:   Sasha Echavarria   : 1955  MRN: [id number]   Account: [de-identified]      Patient's PCP: PATRICIO Looney NP    Admit Date: 2023     Discharge Date: 2023      Admitting Physician: Bree Kramer PA-C     Discharging Physician Assistant: Bree Kramer PA-C     Discharge Diagnoses with Assessment/Plan:    Atrial fibrillation, new onset, with RVR (Resolved)  :Cardiology consulted and signed off- follow up OP in 2 weeks   Start Xarelto on discharge  Was on heparin GTT while IP   Rate controlled with metoprolol   Echo completed without any concerning findings     COVID-19  No supplemental oxygen required- no therapeutic intervention at this time. Continue to monitor and supportive care as needed   Droplet precautions  Educational handout provided   CAD:   Patient denies chest pain at this time. Continue home aspirin, statin, beta-blocker. HFpEF, chronic- improved  BNP elevated at 2195   Cardiology recommends to start lasix at this time along with aldactone and to continue on DC  Switch to bumex to see if she has more robust response- continue with bumex on DC   I and Os, daily weights   Essential hypertension:   Continue home meds on discharge   PAD: Status post angioplasty to right SFA 2019. Aspirin. Insulin-dependent diabetes mellitus: Continue insulin regimen from home  Gastritis with history of Gastric metaplasia   Last EGD  showing erosive gastritis with superficial gastric ulcer and metaplasia. Patient was supposed to proceed with EGD and colonoscopy with Dr Real Sandoval but only completed Colonoscopy 10/5/2022  It is unclear but patient was scheduled for OP EGD with Dr Lebron Castrejon today- she was brought down but procedure was cancelled after they realized she was on heparin drip  Dr Lebron Castrejon completed EGD today- concerns for Zenkers diverticulum but negative for any gastric findings.   Esophagram ordered but will complete OP And follow up with him   Hyponatremia- resolved        The patient was seen and examined on day of discharge and this discharge summary is in conjunction with any daily progress note from day of discharge. Hospital Course:   Initial H&P \"Angle Ventura is a 69 y.o. female with a history of hypertension, HFpEF, hyperlipidemia, coronary artery disease, peripheral artery disease, insulin-dependent diabetes mellitus, gastroesophageal reflux disease, and history of gastric metaplasia who presented to [facility] with chief complaint of fall and shortness of breath. The patient states this morning she was attempting to sit on the commode and states she missed and fell to the ground. She was brought to the emergency department for further evaluation. On arrival, she was noted to be in atrial fibrillation with rapid ventricular response. She has no history of atrial fibrillation prior to this and had a loop recorder placed previously which did not reveal any evidence of atrial fibrillation. Incidentally, the patient tested positive for COVID-19. Her only symptom regarding her COVID-19 diagnosis is cough. She denies any shortness of breath, fever, chills, or myalgias. She denies any ongoing chest pain or palpitations. She was placed on a Cardizem drip for rate control. The patient has a history of HFpEF and takes Lasix as needed. She states she usually takes it approximately 2 times per week. She does have a remote history of GI bleed. Her last colonoscopy was late 2022 which revealed diverticular disease, but no polyps or bleeding. The patient does not recall the last time she had a GI bleed and is uncertain the severity of her bleed or what the etiology was. \"      1/24/2023  No acute events overnight. Patient resting in bed eating lunch during evaluation. Patient short of breath while speaking. Patient denies chest pain. No other issues or concerns at this time.     1/25/2023  No acute events overnight. VSS. Cardio signed off. Patient to have EGD tomorrow? See A and P   Rate well controlled with metoprolol. 1/26/2023  EGD completed without any critical findings. Patient stable for discharge. Home with [facility]          Exam:     Vitals:  Vitals:    01/26/23 0958 01/26/23 1035 01/26/23 1043 01/26/23 1147   BP: 113/63  (!) 158/92 132/80   Pulse: 98 92  87   Resp: 16 20  18   Temp:    98.6 °F (37 °C)   TempSrc:    Oral   SpO2: 95% 100%  98%   Weight:       Height:         Weight: Weight: (!) 359 lb (162.8 kg)     24 hour intake/output:No intake or output data in the 24 hours ending 01/27/23 1651    General appearance: Chronically ill-appearing black female, obese  HEENT: Pupils equal, round, and reactive to light. Conjunctivae/corneas clear. Neck: Supple, with full range of motion. No jugular venous distention. Trachea midline. Respiratory:  Normal respiratory effort. Clear to auscultation, bilaterally without Rales/Wheezes/Rhonchi. Cardiovascular: Irregular rate and rhythm with normal S1/S2 without murmurs, rubs or gallops. 2+ edema bilaterally  Abdomen: Soft, non-tender, non-distended with normal bowel sounds. Musculoskeletal: passive and active ROM x 4 extremities. Skin: Skin color, texture, turgor normal.  No rashes or lesions. Neurologic:  Neurovascularly intact without any focal sensory/motor deficits. Cranial nerves: II-XII intact, grossly non-focal.  Psychiatric: Alert and oriented, anxious   Capillary Refill: Brisk,< 3 seconds   Peripheral Pulses: +2 palpable, equal bilaterally       Labs:  For convenience and continuity at follow-up the following most recent labs are provided:      CBC:    Lab Results   Component Value Date/Time    WBC 5.6 01/26/2023 05:41 AM    HGB 15.3 01/26/2023 05:41 AM    HCT 48.0 01/26/2023 05:41 AM     01/26/2023 05:41 AM       [facility]  EGD l:    Lab Results   Component Value Date/Time     01/26/2023 05:41 AM    K 4.3 01/26/2023 05:41 AM    CL 98 01/26/2023 05:41 AM    CO2 26 01/26/2023 05:41 AM    BUN 15 01/26/2023 05:41 AM    CREATININE 1.0 01/26/2023 05:41 AM    CALCIUM 8.6 01/26/2023 05:41 AM    PHOS 3.0 01/24/2023 05:05 AM       Cardiac: No results for input(s): [facility] in the last 72 hours. Significant Diagnostic Studies    Radiology:   XR CHEST PORTABLE   Final Result   1. No interval change since previous study dated 10 of October 2022. Chela Ritchie **This report has been created using voice recognition software. It may contain minor errors which are inherent in voice recognition technology. **      Final report electronically signed by DR Carl Miller on 1/23/2023 2:49 PM      FL ESOPHAGRAM    (Results Pending)   FL ESOPHAGRAM    (Results Pending)          Consults:     IP CONSULT TO CARDIOLOGY  IP CONSULT TO SOCIAL WORK  IP CONSULT TO HOME CARE NEEDS    Disposition:    [x] Home       [] TCU       [] Rehab       [] Psych       [] SNF       [] [place]       [] Other-    Condition at Discharge: Stable    Code Status:  Prior     Pending tests at discharge:          Patient Instructions:    Discharge lab work:    Activity: activity as tolerated  Diet: No diet orders on file      Follow-up visits:   [place] [address]  [phone]        Naheed Calvo MD  [facility], [address]  [phone]    Follow up in 2 week(s)      Nicol Garcia MD  [address] [place] [address]  [phone]    Follow up in 2 week(s)  complete esophagram prior to appointment         Discharge Medications:        Medication List        START taking these medications      aspirin 81 MG chewable tablet  Take 1 tablet by mouth daily     atorvastatin 40 MG tablet  Commonly known as: LIPITOR     bumetanide 1 MG tablet  Commonly known as: BUMEX  Take 1 tablet by mouth 2 times daily     metoprolol tartrate 25 MG tablet  Commonly known as: LOPRESSOR  Take 1 tablet by mouth 2 times daily     rivaroxaban 15 & 20 MG Starter Pack  Take as directed on package. CHANGE how you take these medications      insulin glargine 100 UNIT/ML injection vial  Commonly known as: LANTUS  Inject 20 Units into the skin nightly  What changed:   how much to take  when to take this     vitamin C 500 MG tablet  Commonly known as: ASCORBIC ACID  What changed: Another medication with the same name was removed. Continue taking this medication, and follow the directions you see here. CONTINUE taking these medications      allopurinol 300 MG tablet  Commonly known as: ZYLOPRIM     amLODIPine 5 MG tablet  Commonly known as: NORVASC     colchicine 0.6 MG tablet  Commonly known as: COLCRYS     * insulin lispro 100 UNIT/ML injection vial  Commonly known as: HUMALOG  Inject 0-18 Units into the skin 3 times daily (with meals)     * insulin lispro 100 UNIT/ML injection vial  Commonly known as: HUMALOG  Inject 0-9 Units into the skin nightly     losartan 50 MG tablet  Commonly known as: COZAAR     miconazole 2 % powder  Commonly known as: MICOTIN  Apply topically 2 times daily. pantoprazole 40 MG tablet  Commonly known as: PROTONIX  Take 1 tablet by mouth every morning (before breakfast)     potassium chloride 10 MEQ extended release tablet  Commonly known as: KLOR-CON     Semaglutide(0.25 or 0.5MG/DOS) 2 MG/1.5ML Sopn     spironolactone 25 MG tablet  Commonly known as: ALDACTONE     Vitamin D3 25 MCG Tabs  Take 1 tablet by mouth daily           * This list has 2 medication(s) that are the same as other medications prescribed for you. Read the directions carefully, and ask your doctor or other care provider to review them with you.                 STOP taking these medications      atenolol 100 MG tablet  Commonly known as: TENORMIN     capsicum 0.075 % topical cream  Commonly known as: ZOSTRIX     cetirizine 10 MG tablet  Commonly known as: ZYRTEC     cyclobenzaprine 5 MG tablet  Commonly known as: FLEXERIL     fish oil 1000 MG capsule     glimepiride 2 MG tablet  Commonly known as: AMARYL     naproxen 375 MG tablet  Commonly known as: NAPROSYN     polyethylene glycol 17 GM/SCOOP powder  Commonly known as: MiraLax     Senna 8.6 MG Caps     VITAMIN A PO     zinc sulfate 220 (50 Zn) MG capsule  Commonly known as: ZINCATE               Where to Get Your Medications        These medications were sent to [facility], [address] [facility]  [address] [address]      Phone: [phone]   bumetanide 1 MG tablet  metoprolol tartrate 25 MG tablet  rivaroxaban 15 & 20 MG Starter Pack       Information about where to get these medications is not yet available    Ask your nurse or doctor about these medications  insulin glargine 100 UNIT/ML injection vial         Time Spent on discharge is more than 45 minutes in the examination, evaluation, counseling and review of medications and discharge plan. Signed: Thank you PATRICIO Breen NP for the opportunity to be involved in this patient's care.     Electronically signed by Alexei Gresham PA-C on 1/27/2023 at 4:51 PM

## 2023-02-08 NOTE — PROGRESS NOTES
Physician Progress Note      PATIENT:               Mercedes Luna  CSN #:                  [id number]  :                       1955  ADMIT DATE:       2023 2:07 PM  [address] DATE:        2023 4:45 PM  RESPONDING  PROVIDER #:        Edi Guthrie PA-C          QUERY TEXT:    Pt admitted with new onset afib with RVR. Noted documentation of new onset   afib likely secondary to COVID-19 on underlying VICKI/OHS in  consult note   by ordered cardiology consultant. If possible, please document in progress   notes and discharge summary:        The medical record reflects the following:  Risk Factors: Covid 19 VICKI/OHS  Clinical Indicators: Cardiology consult note states \"New onset Atrial   Fibrillation in RVR: [id number] 5. Initial EKG Efib in RVR with PVCs ,   Qtc 458. No ST or T waves abnormalities. Trop negative. No   CP/SOB/palpitations. No prior history of afib, previous loop recorder   negative. Likely secondary to COVID-19 on underlying VICKI/OHS. \"  Treatment: Cardiology consult, started Xarelto at DC, Heparin drip,   metoprolol, ECHO    Thank you,  Ivone Waldrop MSN, RN, CCDS, CRCR  Options provided:  -- New onset afib likely secondary to COVID-19 on underlying VICKI/OHS  -- New onset afib not related to COVID-19 on underlying VICKI/OHS  -- Other - I will add my own diagnosis  -- Disagree - Not applicable / Not valid  -- Disagree - Clinically unable to determine / Unknown  -- Refer to Clinical Documentation Reviewer    PROVIDER RESPONSE TEXT:    The diagnosis of new onset afib is likely secondary to COVID-19 on underlying   VICKI/OHS.     Query created by: Marco Blake on 2023 7:24 AM      Electronically signed by:  Edi Guthrie PA-C 2023 7:27 AM

## 2023-02-09 ENCOUNTER — TELEPHONE (OUTPATIENT)
Dept: CARDIOLOGY CLINIC | Age: 68
End: 2023-02-09
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
## 2023-02-27 ENCOUNTER — TELEPHONE (OUTPATIENT)
Dept: CARDIOLOGY CLINIC | Age: 68
End: 2023-02-27

## 2023-03-10 ENCOUNTER — HOSPITAL ENCOUNTER (OUTPATIENT)
Age: 68
Setting detail: SPECIMEN
Discharge: HOME | End: 2023-03-10

## 2023-03-10 LAB
ALBUMIN SERPL-MCNC: 3.8 G/DL (ref 3.5–5.2)
ALBUMIN/GLOBULIN RATIO: 1 (ref 1–2.5)
ALP SERPL-CCNC: 59 U/L (ref 35–104)
ALT SERPL-CCNC: 11 U/L (ref 5–33)
ANION GAP SERPL CALCULATED.3IONS-SCNC: 14 MMOL/L (ref 9–17)
AST SERPL-CCNC: 15 U/L
BILIRUB SERPL-MCNC: 0.5 MG/DL (ref 0.3–1.2)
BNP SERPL-MCNC: 744 PG/ML
BUN SERPL-MCNC: 13 MG/DL (ref 8–23)
CALCIUM SERPL-MCNC: 9.7 MG/DL (ref 8.6–10.4)
CHLORIDE SERPL-SCNC: 100 MMOL/L (ref 98–107)
CHOLEST SERPL-MCNC: 203 MG/DL
CHOLESTEROL/HDL RATIO: 4.1
CO2 SERPL-SCNC: 27 MMOL/L (ref 20–31)
CREAT SERPL-MCNC: 0.7 MG/DL (ref 0.5–0.9)
GFR SERPL CREATININE-BSD FRML MDRD: >60 ML/MIN/1.73M2
GLUCOSE SERPL-MCNC: 142 MG/DL (ref 70–99)
HCT VFR BLD AUTO: 47.9 % (ref 36.3–47.1)
HDLC SERPL-MCNC: 49 MG/DL
HGB BLD-MCNC: 14.5 G/DL (ref 11.9–15.1)
LDLC SERPL CALC-MCNC: 138 MG/DL (ref 0–130)
MAGNESIUM SERPL-MCNC: 1.5 MG/DL (ref 1.6–2.6)
MCH RBC QN AUTO: 26.6 PG (ref 25.2–33.5)
MCHC RBC AUTO-ENTMCNC: 30.3 G/DL (ref 28.4–34.8)
MCV RBC AUTO: 87.7 FL (ref 82.6–102.9)
NRBC AUTOMATED: 0 PER 100 WBC
PDW BLD-RTO: 18 % (ref 11.8–14.4)
PLATELET # BLD AUTO: 275 K/UL (ref 138–453)
PMV BLD AUTO: 10.5 FL (ref 8.1–13.5)
POTASSIUM SERPL-SCNC: 3.4 MMOL/L (ref 3.7–5.3)
PROT SERPL-MCNC: 7.6 G/DL (ref 6.4–8.3)
RBC # BLD: 5.46 M/UL (ref 3.95–5.11)
SODIUM SERPL-SCNC: 141 MMOL/L (ref 135–144)
TRIGL SERPL-MCNC: 82 MG/DL
TSH SERPL-ACNC: 3.41 UIU/ML (ref 0.3–5)
WBC # BLD AUTO: 7.2 K/UL (ref 3.5–11.3)

## 2023-03-30 ENCOUNTER — TELEPHONE (OUTPATIENT)
Dept: WOUND CARE | Age: 68
End: 2023-03-30

## 2023-03-30 NOTE — TELEPHONE ENCOUNTER
Dejah Drake from [facility] called with regards to wound care orders for patient. Patient is not current at this time and no referral has been received at this time. Dejah Drake will speak to referring office to send referral for patient for left leg wound.

## 2023-03-30 NOTE — TELEPHONE ENCOUNTER
Called patient regarding wound care referral. She declines to scheduled at this time as she would prefer to go to [facility] as all of her providers are there. Referring office notified.

## 2023-03-31 ENCOUNTER — HOSPITAL ENCOUNTER (OUTPATIENT)
Age: 68
Setting detail: SPECIMEN
Discharge: HOME | End: 2023-03-31

## 2023-04-02 LAB
MICROORGANISM SPEC CULT: ABNORMAL
MICROORGANISM SPEC CULT: ABNORMAL
MICROORGANISM/AGENT SPEC: ABNORMAL
MICROORGANISM/AGENT SPEC: ABNORMAL
SPECIMEN DESCRIPTION: ABNORMAL

## 2023-04-06 ENCOUNTER — TELEPHONE (OUTPATIENT)
Dept: WOUND CARE | Age: 68
End: 2023-04-06

## 2023-04-06 ENCOUNTER — TELEPHONE (OUTPATIENT)
Dept: HYPERBARIC MEDICINE | Age: 68
End: 2023-04-06

## 2023-04-06 NOTE — TELEPHONE ENCOUNTER
----- Message from Jillian Schmitt RN sent at 4/6/2023  9:28 AM EDT -----  Regarding: wound culture  Received call from Jose Maria at [facility] about patients wound culture and \"need for IV antibiotics\". Attempted to call Jose Maria back as patient declined appointment and she did not answer. We can try again later.  [phone] ask for Isidoro Soliz or [employer]

## 2023-04-06 NOTE — TELEPHONE ENCOUNTER
----- Message from Bianca Carlos RN sent at 4/6/2023  9:28 AM EDT -----  Regarding: wound culture  Received call from Jose Maria at [facility] about patients wound culture and \"need for IV antibiotics\". Attempted to call Jose Maria back as patient declined appointment and she did not answer. We can try again later.  [phone] ask for Phil Mccarthy or [employer]

## 2023-04-06 NOTE — TELEPHONE ENCOUNTER
Called laureen at [facility], no answer, left VM. Second attempt  Attempted to call patient to verify if she has an appointment with  wound clinic or if she wants to schedule here.  No answer, left VM

## 2023-04-06 NOTE — TELEPHONE ENCOUNTER
Returned call to [facility] regarding referral and patient culture as patient declined our services on 3/30/23, was transferred to Jose Maria who did not  the phone. Will try again later.

## (undated) DEVICE — SOLUTION IV 1000ML 0.45% SOD CHL PH 5 INJ USP VIAFLX PLAS

## (undated) DEVICE — Device

## (undated) DEVICE — SET ADMIN 25ML L117IN PMP MOD CK VLV RLER CLMP 2 SMRTSITE